# Patient Record
Sex: MALE | Race: WHITE | NOT HISPANIC OR LATINO | Employment: OTHER | URBAN - METROPOLITAN AREA
[De-identification: names, ages, dates, MRNs, and addresses within clinical notes are randomized per-mention and may not be internally consistent; named-entity substitution may affect disease eponyms.]

---

## 2017-07-10 DIAGNOSIS — E78.5 HYPERLIPIDEMIA LDL GOAL <130: ICD-10-CM

## 2017-07-10 NOTE — TELEPHONE ENCOUNTER
Reason for Call:  Medication or medication refill:    Do you use a [facility]?  Name of the pharmacy and phone number for the current request:  [facility] =[facility] - [phone]    Name of the medication requested: Lipitor    Other request: Pt called this morning and would like a refill on his statin (lipitor) please give pt a call once this prescription is ready for him to .    Can we leave a detailed message on this number? YES    Phone number patient can be reached at: Cell number on file:    Telephone Information:   Mobile [phone]       Best Time:     Call taken on 7/10/2017 at 11:24 AM by Nedra Feliciano

## 2017-07-11 RX ORDER — ATORVASTATIN CALCIUM 20 MG/1
20 TABLET, FILM COATED ORAL DAILY
Qty: 90 TABLET | Refills: 0 | Status: SHIPPED | OUTPATIENT
Start: 2017-07-11 | End: 2017-10-03

## 2017-07-11 NOTE — TELEPHONE ENCOUNTER
Medication is being filled for 1 time refill only due to:  Patient needs to be seen because due for PE and lipids.    Lab Results   Component Value Date    CHOL 119 06/13/2016     Lab Results   Component Value Date    HDL 45 06/13/2016     Lab Results   Component Value Date    LDL 57 06/13/2016     Lab Results   Component Value Date    TRIG 86 06/13/2016     Lab Results   Component Value Date    CHOLHDLRATIO 4.2 07/28/2015

## 2017-10-03 ENCOUNTER — OFFICE VISIT (OUTPATIENT)
Dept: FAMILY MEDICINE | Facility: CLINIC | Age: 62
End: 2017-10-03
Payer: COMMERCIAL

## 2017-10-03 VITALS
TEMPERATURE: 98.2 F | OXYGEN SATURATION: 95 % | HEIGHT: 69 IN | SYSTOLIC BLOOD PRESSURE: 127 MMHG | WEIGHT: 220 LBS | BODY MASS INDEX: 32.58 KG/M2 | HEART RATE: 78 BPM | DIASTOLIC BLOOD PRESSURE: 79 MMHG

## 2017-10-03 DIAGNOSIS — E78.5 HYPERLIPIDEMIA LDL GOAL <130: ICD-10-CM

## 2017-10-03 DIAGNOSIS — N18.30 CKD (CHRONIC KIDNEY DISEASE) STAGE 3, GFR 30-59 ML/MIN (H): Primary | ICD-10-CM

## 2017-10-03 LAB
ALBUMIN SERPL-MCNC: 4.3 G/DL (ref 3.4–5)
ALP SERPL-CCNC: 70 U/L (ref 40–150)
ALT SERPL W P-5'-P-CCNC: 66 U/L (ref 0–70)
ANION GAP SERPL CALCULATED.3IONS-SCNC: 8 MMOL/L (ref 3–14)
AST SERPL W P-5'-P-CCNC: 45 U/L (ref 0–45)
BILIRUB SERPL-MCNC: 3.9 MG/DL (ref 0.2–1.3)
BUN SERPL-MCNC: 12 MG/DL (ref 7–30)
CALCIUM SERPL-MCNC: 9.6 MG/DL (ref 8.5–10.1)
CHLORIDE SERPL-SCNC: 104 MMOL/L (ref 94–109)
CHOLEST SERPL-MCNC: 128 MG/DL
CO2 SERPL-SCNC: 27 MMOL/L (ref 20–32)
CREAT SERPL-MCNC: 1.23 MG/DL (ref 0.66–1.25)
CREAT UR-MCNC: 383 MG/DL
ERYTHROCYTE [DISTWIDTH] IN BLOOD BY AUTOMATED COUNT: 12.7 % (ref 10–15)
GFR SERPL CREATININE-BSD FRML MDRD: 59 ML/MIN/1.7M2
GLUCOSE SERPL-MCNC: 111 MG/DL (ref 70–99)
HCT VFR BLD AUTO: 46.9 % (ref 40–53)
HDLC SERPL-MCNC: 52 MG/DL
HGB BLD-MCNC: 16.5 G/DL (ref 13.3–17.7)
LDLC SERPL CALC-MCNC: 50 MG/DL
MCH RBC QN AUTO: 33.5 PG (ref 26.5–33)
MCHC RBC AUTO-ENTMCNC: 35.2 G/DL (ref 31.5–36.5)
MCV RBC AUTO: 95 FL (ref 78–100)
MICROALBUMIN UR-MCNC: 27 MG/L
MICROALBUMIN/CREAT UR: 7.08 MG/G CR (ref 0–17)
NONHDLC SERPL-MCNC: 76 MG/DL
PLATELET # BLD AUTO: 179 10E9/L (ref 150–450)
POTASSIUM SERPL-SCNC: 4.7 MMOL/L (ref 3.4–5.3)
PROT SERPL-MCNC: 7.7 G/DL (ref 6.8–8.8)
RBC # BLD AUTO: 4.93 10E12/L (ref 4.4–5.9)
SODIUM SERPL-SCNC: 139 MMOL/L (ref 133–144)
TRIGL SERPL-MCNC: 130 MG/DL
WBC # BLD AUTO: 5.5 10E9/L (ref 4–11)

## 2017-10-03 PROCEDURE — 80061 LIPID PANEL: CPT | Performed by: INTERNAL MEDICINE

## 2017-10-03 PROCEDURE — 36415 COLL VENOUS BLD VENIPUNCTURE: CPT | Performed by: INTERNAL MEDICINE

## 2017-10-03 PROCEDURE — 82043 UR ALBUMIN QUANTITATIVE: CPT | Performed by: INTERNAL MEDICINE

## 2017-10-03 PROCEDURE — 80053 COMPREHEN METABOLIC PANEL: CPT | Performed by: INTERNAL MEDICINE

## 2017-10-03 PROCEDURE — 85027 COMPLETE CBC AUTOMATED: CPT | Performed by: INTERNAL MEDICINE

## 2017-10-03 PROCEDURE — 99214 OFFICE O/P EST MOD 30 MIN: CPT | Performed by: INTERNAL MEDICINE

## 2017-10-03 RX ORDER — ATORVASTATIN CALCIUM 20 MG/1
20 TABLET, FILM COATED ORAL DAILY
Qty: 90 TABLET | Refills: 3 | Status: SHIPPED | OUTPATIENT
Start: 2017-10-03 | End: 2018-09-20

## 2017-10-03 NOTE — NURSING NOTE
"Chief Complaint   Patient presents with     RECHECK       Initial /79 (BP Location: Right arm, Patient Position: Sitting, Cuff Size: Adult Regular)  Pulse 78  Temp 98.2  F (36.8  C) (Oral)  Ht 5' 9\" (1.753 m)  Wt 220 lb (99.8 kg)  SpO2 95%  BMI 32.49 kg/m2 Estimated body mass index is 32.49 kg/(m^2) as calculated from the following:    Height as of this encounter: 5' 9\" (1.753 m).    Weight as of this encounter: 220 lb (99.8 kg).  Medication Reconciliation: complete   Zully Newton- CMA      "

## 2017-10-03 NOTE — MR AVS SNAPSHOT
After Visit Summary   10/3/2017    Billie Aden    MRN: [id number]           Patient Information     Date Of Birth          1955        Visit Information        Provider Department      10/3/2017 9:00 AM Jacob Graham MD [facility]        Today's Diagnoses     CKD (chronic kidney disease) stage 3, GFR 30-59 ml/min    -  1    Hyperlipidemia LDL goal <130          Care Instructions    (N18.3) CKD (chronic kidney disease) stage 3, GFR 30-59 ml/min  (primary encounter diagnosis)  Comment: We will check renal function today and continue to monitor fluid retention.  Doing well  Plan: CBC with platelets, Comprehensive metabolic         panel            (E78.5) Hyperlipidemia LDL goal <130  Comment: check fasting lipid panel today and continue atorvastatin  Plan: atorvastatin (LIPITOR) 20 MG tablet, Lipid         panel reflex to direct LDL             PSA was discussed and deferred as per USPTF guidelines           Follow-ups after your visit        Who to contact     If you have questions or need follow up information about today's clinic visit or your schedule please contact [facility] directly at [phone].  Normal or non-critical lab and imaging results will be communicated to you by SpotXchangehart, letter or phone within 4 business days after the clinic has received the results. If you do not hear from us within 7 days, please contact the clinic through SpotXchangehart or phone. If you have a critical or abnormal lab result, we will notify you by phone as soon as possible.  Submit refill requests through ClaimSync or call your pharmacy and they will forward the refill request to us. Please allow 3 business days for your refill to be completed.          Additional Information About Your Visit        MyChart Information     ClaimSync gives you secure access to your electronic health record. If you see a primary care provider, you can also send messages to your care team and make  "appointments. If you have questions, please call your primary care clinic.  If you do not have a primary care provider, please call [phone] and they will assist you.        Care EveryWhere ID     This is your Care EveryWhere ID. This could be used by other organizations to access your [place] medical records  [id number]        Your Vitals Were     Pulse Temperature Height Pulse Oximetry BMI (Body Mass Index)       78 98.2  F (36.8  C) (Oral) 5' 9\" (1.753 m) 95% 32.49 kg/m2        Blood Pressure from Last 3 Encounters:   10/03/17 127/79   10/28/16 (!) 135/94   06/13/16 126/76    Weight from Last 3 Encounters:   10/03/17 220 lb (99.8 kg)   10/28/16 217 lb (98.4 kg)   06/13/16 212 lb (96.2 kg)              We Performed the Following     CBC with platelets     Comprehensive metabolic panel     Lipid panel reflex to direct LDL          Where to get your medicines      These medications were sent to [facility], [place] - [address], [address]  [address], [address], [place] [address]     Phone:  [phone]     atorvastatin 20 MG tablet          Primary Care Provider Office Phone # Fax #    Jacob Graham [phone] [address]  [place] [address]        Equal Access to Services     [facility] : Hadii aad ku hadasho Somalik, waaxda luqadaha, qaybta kaalmada adecaliyada, chelo kendrick . So [facility] [phone].    ATENCIÓN: Si habla español, tiene a bledsoe disposición servicios gratuitos de asistencia lingüística. Llame al [phone].    We comply with applicable federal civil rights laws and [place] laws. We do not discriminate on the basis of race, color, national origin, age, disability, sex, sexual orientation, or gender identity.            Thank you!     Thank you for choosing [facility]  for your care. Our goal is always to provide you with excellent care. Hearing back from our patients is " one way we can continue to improve our services. Please take a few minutes to complete the written survey that you may receive in the mail after your visit with us. Thank you!             Your Updated Medication List - Protect others around you: Learn how to safely use, store and throw away your medicines at www.disposemymeds.org.          This list is accurate as of: 10/3/17  9:25 AM.  Always use your most recent med list.                   Brand Name Dispense Instructions for use Diagnosis    aspirin 81 MG tablet     90 tablet    Take 1 tablet (81 mg) by mouth daily    Hyperlipidemia LDL goal <130       atorvastatin 20 MG tablet    LIPITOR    90 tablet    Take 1 tablet (20 mg) by mouth daily    Hyperlipidemia LDL goal <130       sildenafil 20 MG tablet    REVATIO    30 tablet    Take 1-2 tablets (20-40 mg) by mouth daily as needed for erectile dysfunction Take 30 min to 4 hours before intercourse. ?Never use with nitroglycerin, terazosin or doxazosin    Erectile dysfunction, unspecified erectile dysfunction type

## 2017-10-03 NOTE — PATIENT INSTRUCTIONS
(N18.3) CKD (chronic kidney disease) stage 3, GFR 30-59 ml/min  (primary encounter diagnosis)  Comment: We will check renal function today and continue to monitor fluid retention.  Doing well  Plan: CBC with platelets, Comprehensive metabolic         panel            (E78.5) Hyperlipidemia LDL goal <130  Comment: check fasting lipid panel today and continue atorvastatin  Plan: atorvastatin (LIPITOR) 20 MG tablet, Lipid         panel reflex to direct LDL             PSA was discussed and deferred as per USPTF guidelines

## 2017-10-03 NOTE — PROGRESS NOTES
"[facility]  CLINIC PROGRESS NOTE    Subjective:  Hyperlipidemia LDL goal <130   Has been taking atorvastatin and possible side effects of achilles tendon soreness.    CKD (chronic kidney disease) stage 3, GFR 30-59 ml/min   No acute concerns.  Has not had any fluid retention other than swelling in the achilles area in lower legs.  Achilles Tendon Soreness   Billie Aden has noted that his achilles on both feet tend to swell and resolve spontaneously.  [place] but this resolved with just walking.      Past medical history, medications, allergies, social history, family history reviewed and updated in [facility] as of 10/3/2017 .    ROS  CONSTITUTIONAL: no fatigue, no unexpected change in weight  SKIN: no worrisome rashes, no worrisome moles, no worrisome lesions  EYES: no acute vision problems or changes  ENT: no ear problems, no mouth problems, no throat problems  RESP: no significant cough, no shortness of breath  CV: no chest pain, no palpitations, no new or worsening peripheral edema  GI: no nausea, no vomiting, no constipation, no diarrhea  : no frequency, no dysuria, no hematuria  MS: as above   PSYCHIATRIC: no changes in mood or affect      Objective:  Vitals  /79 (BP Location: Right arm, Patient Position: Sitting, Cuff Size: Adult Regular)  Pulse 78  Temp 98.2  F (36.8  C) (Oral)  Ht 5' 9\" (1.753 m)  Wt 220 lb (99.8 kg)  SpO2 95%  BMI 32.49 kg/m2  GEN: Alert Oriented x3 NAD  HEENT: Atraumatic, normocephalic, neck supple, no thyromegaly, negative cervical adenopathy  TM: TM bilaterally pearly and grey with normal light reflex  CV: RRR no murmurs or rubs  PULM: CTA no wheezes or crackles  ABD: Soft, nontender nondistended, no hepatosplenomegally  SKIN: No visible skin lesion or ulcerations  EXT: No edema bilateral lower extremities, achilles appear normal flexion and extension  NEURO: Gait and station normal, No focal neurologic " deficits  PSYCH: Mood good, affect mood congruent    Results for orders placed or performed in visit on 10/03/17 (from the past 24 hour(s))   CBC with platelets   Result Value Ref Range    WBC 5.5 4.0 - 11.0 10e9/L    RBC Count 4.93 4.4 - 5.9 10e12/L    Hemoglobin 16.5 13.3 - 17.7 g/dL    Hematocrit 46.9 40.0 - 53.0 %    MCV 95 78 - 100 fl    MCH 33.5 (H) 26.5 - 33.0 pg    MCHC 35.2 31.5 - 36.5 g/dL    RDW 12.7 10.0 - 15.0 %    Platelet Count 179 150 - 450 10e9/L       Assessment/Plan:  Patient Instructions   (N18.3) CKD (chronic kidney disease) stage 3, GFR 30-59 ml/min  (primary encounter diagnosis)  Comment: We will check renal function today and continue to monitor fluid retention.  Doing well  Plan: CBC with platelets, Comprehensive metabolic         panel            (E78.5) Hyperlipidemia LDL goal <130  Comment: check fasting lipid panel today and continue atorvastatin - discussed this was not likely causing achilles tendon pain   Plan: atorvastatin (LIPITOR) 20 MG tablet, Lipid         panel reflex to direct LDL             PSA was discussed and deferred as per USPTF guidelines     Recommend shingles vaccine and flu shot      Follow up in 6 months     Disclaimer: This note consists of symbols derived from keyboarding, dictation and/or voice recognition software. As a result, there may be errors in the script that have gone undetected. Please consider this when interpreting information found in this chart.    Jacob Graham MD  [phone]

## 2017-10-04 ENCOUNTER — TELEPHONE (OUTPATIENT)
Dept: FAMILY MEDICINE | Facility: CLINIC | Age: 62
End: 2017-10-04

## 2017-10-04 DIAGNOSIS — E80.6 HYPERBILIRUBINEMIA: ICD-10-CM

## 2017-10-04 DIAGNOSIS — N18.30 CKD (CHRONIC KIDNEY DISEASE) STAGE 3, GFR 30-59 ML/MIN (H): Primary | ICD-10-CM

## 2017-10-04 NOTE — PROGRESS NOTES
Martinez Wise,    I had the opportunity to review your recent labs and a summary of your labs reads as follows:    Your complete blood counts show no sign of anemia, normal white blood cell count and platelets.  Your comprehensive metabolic panel showed slightly worsening renal function and I would recommend you continue to try to push fluids and remain hydrated.  We can recheck this in the next month  Interestingly, your bilirubin is still a bit elevated and I would like to repeat your liver function tests when you are not fasting.  Can we recheck this in one month as welll?  Your fasting lipid panel show  - normal HDL (good) cholesterol -as your goal is greater than 40  - low LDL (bad) cholesterol as your goal is less than 130  - normal triglyceride levels      Sincerely,  Jacob Graham MD

## 2017-10-04 NOTE — TELEPHONE ENCOUNTER
Patient said he saw his labs on MyChart and is wondering what the slightly elevated result of MCH means? Should he be concern?     Also, patient would like to know when he should follow up with you? After his labs next month?    Thank you,    HILLARY Fontanez

## 2017-10-04 NOTE — TELEPHONE ENCOUNTER
To PCP:  Are you able to outreach to patient regarding lab results?  Please see below message.  Thank you.  Sonam Grady RN

## 2017-10-04 NOTE — TELEPHONE ENCOUNTER
Can we call Billie Aden and let him know that     Martinez Wise,    I had the opportunity to review your recent labs and a summary of your labs reads as follows:    Your complete blood counts show no sign of anemia, normal white blood cell count and platelets.  Your comprehensive metabolic panel showed slightly worsening renal function and I would recommend you continue to try to push fluids and remain hydrated.  We can recheck this in the next month  Interestingly, your bilirubin is still a bit elevated and I would like to repeat your liver function tests when you are not fasting.  Can we recheck this in one month as welll?  Your fasting lipid panel show  - normal HDL (good) cholesterol -as your goal is greater than 40  - low LDL (bad) cholesterol as your goal is less than 130  - normal triglyceride levels        Sincerely,  Jacob Graham MD

## 2017-10-04 NOTE — TELEPHONE ENCOUNTER
Patient was informed of Dr Graham note and the patient is still confused and want someone to explain more to him about the test and should he be concerned. Can someone assist this patient    VELVET Cortez

## 2017-10-04 NOTE — TELEPHONE ENCOUNTER
MCH is the mean corpuscular hemoglobin and is a measurement of hemoglobin concentration divided by red blood cells.  It is useful if it is low as it sometimes a finding of iron deficiency anemia, but elevated MCH is not typically helpful in working up blood disorders    Jacob Graham MD

## 2017-10-23 ENCOUNTER — TRANSFERRED RECORDS (OUTPATIENT)
Dept: HEALTH INFORMATION MANAGEMENT | Facility: CLINIC | Age: 62
End: 2017-10-23

## 2017-10-25 DIAGNOSIS — E80.6 HYPERBILIRUBINEMIA: ICD-10-CM

## 2017-10-25 DIAGNOSIS — N18.30 CKD (CHRONIC KIDNEY DISEASE) STAGE 3, GFR 30-59 ML/MIN (H): ICD-10-CM

## 2017-10-25 LAB
ALBUMIN SERPL-MCNC: 4.1 G/DL (ref 3.4–5)
ALP SERPL-CCNC: 70 U/L (ref 40–150)
ALT SERPL W P-5'-P-CCNC: 56 U/L (ref 0–70)
ANION GAP SERPL CALCULATED.3IONS-SCNC: 11 MMOL/L (ref 3–14)
AST SERPL W P-5'-P-CCNC: 40 U/L (ref 0–45)
BILIRUB DIRECT SERPL-MCNC: 0.4 MG/DL (ref 0–0.2)
BILIRUB SERPL-MCNC: 2.2 MG/DL (ref 0.2–1.3)
BUN SERPL-MCNC: 22 MG/DL (ref 7–30)
CALCIUM SERPL-MCNC: 9.3 MG/DL (ref 8.5–10.1)
CHLORIDE SERPL-SCNC: 108 MMOL/L (ref 94–109)
CO2 SERPL-SCNC: 24 MMOL/L (ref 20–32)
CREAT SERPL-MCNC: 1.12 MG/DL (ref 0.66–1.25)
GFR SERPL CREATININE-BSD FRML MDRD: 66 ML/MIN/1.7M2
GLUCOSE SERPL-MCNC: 111 MG/DL (ref 70–99)
POTASSIUM SERPL-SCNC: 4.8 MMOL/L (ref 3.4–5.3)
PROT SERPL-MCNC: 7.5 G/DL (ref 6.8–8.8)
SODIUM SERPL-SCNC: 143 MMOL/L (ref 133–144)

## 2017-10-25 PROCEDURE — 36415 COLL VENOUS BLD VENIPUNCTURE: CPT | Performed by: INTERNAL MEDICINE

## 2017-10-25 PROCEDURE — 80076 HEPATIC FUNCTION PANEL: CPT | Performed by: INTERNAL MEDICINE

## 2017-10-25 PROCEDURE — 80048 BASIC METABOLIC PNL TOTAL CA: CPT | Performed by: INTERNAL MEDICINE

## 2017-10-27 NOTE — PROGRESS NOTES
Martinez Wise,    I have had the opportunity to review your recent results and an interpretation is as follows:  Your follow up labs show an improvement in your creatinine - continue to push fluids and we can follow up in 6 months to recheck  Your liver function tests also show an improvement back to baseline of your bilirubin level.    Sincerely,  Jacob Graham MD

## 2017-11-10 ENCOUNTER — TELEPHONE (OUTPATIENT)
Dept: FAMILY MEDICINE | Facility: CLINIC | Age: 62
End: 2017-11-10

## 2017-11-10 DIAGNOSIS — Z01.00 ROUTINE EYE EXAM: Primary | ICD-10-CM

## 2017-11-10 NOTE — TELEPHONE ENCOUNTER
Reason for Call: Request for an order or referral:    Order or referral being requested: Opthamologist    Date needed: as soon as possible    Has the patient been seen by the PCP for this problem? YES    Additional comments: Billie Aden would like to speak with Dr. Graham where he should go.    Phone number Patient can be reached at:  Home number on file [phone] (home)    Best Time:  ASAP     Can we leave a detailed message on this number?  YES    Call taken on 11/10/2017 at 3:08 PM by Susan Avitia

## 2017-11-21 ENCOUNTER — PRE VISIT (OUTPATIENT)
Dept: OPHTHALMOLOGY | Facility: CLINIC | Age: 62
End: 2017-11-21

## 2017-11-21 NOTE — TELEPHONE ENCOUNTER
For the evaluation of   Chief Complaint   Patient presents with     Previsit     approsa w/ Dr Melvin     Basal Cell Carcinoma     left lower eye lid, consult prior to Mohs w/ [facility] office no date scheduled for procedure       When was your last eye exam w/ Dilation?   Do you wear glasses?  Please bring them with you to your appointment.  Do you wear contacts?   How many hours per day to you wear your lenses?   Please bring the boxes with you to your appointment.      HPI:  Location:   OS Lower eye lid  Symptoms:   Negative for vision changes or eye problems  Pertinent Negatives:   Negative for vision changes or eye problems  Severity:   mild  Duration:   years  2  Timing:   gradual onset  Other:           Do you use any eye drops? NO  For what condition(s)?    Ocular Meds:  none       ROS:    Review Of Systems  Eyes: as above  Endocrine:      Allergies:    Allergies   Allergen Reactions     Morphine Anxiety       Systemic Medications:   Current Outpatient Prescriptions   Medication     atorvastatin (LIPITOR) 20 MG tablet     sildenafil (REVATIO/VIAGRA) 20 MG tablet     aspirin 81 MG tablet     No current facility-administered medications for this visit.        Family History   Problem Relation Age of Onset     Cancer - colorectal Mother 79     Cardiovascular Father      carotid stenosis     HEART DISEASE Brother 66       Social History   Substance Use Topics     Smoking status: Never Smoker     Smokeless tobacco: Never Used     Alcohol use 0.0 oz/week     0 Standard drinks or equivalent per week      Comment: occasionally       Maria R KENDRICK OSC

## 2017-11-22 ENCOUNTER — OFFICE VISIT (OUTPATIENT)
Dept: OPHTHALMOLOGY | Facility: CLINIC | Age: 62
End: 2017-11-22
Payer: COMMERCIAL

## 2017-11-22 DIAGNOSIS — C44.111 BCC (BASAL CELL CARCINOMA), EYELID, LEFT: Primary | ICD-10-CM

## 2017-11-22 PROCEDURE — 99203 OFFICE O/P NEW LOW 30 MIN: CPT | Performed by: OPHTHALMOLOGY

## 2017-11-22 PROCEDURE — 92285 EXTERNAL OCULAR PHOTOGRAPHY: CPT | Performed by: OPHTHALMOLOGY

## 2017-11-22 ASSESSMENT — VISUAL ACUITY
CORRECTION_TYPE: GLASSES
OS_CC: 20/20
OD_CC: 20/20
METHOD: SNELLEN - LINEAR
OD_CC+: -1

## 2017-11-22 ASSESSMENT — EXTERNAL EXAM - RIGHT EYE: OD_EXAM: NORMAL

## 2017-11-22 ASSESSMENT — SLIT LAMP EXAM - LIDS: COMMENTS: NORMAL

## 2017-11-22 ASSESSMENT — EXTERNAL EXAM - LEFT EYE: OS_EXAM: NORMAL

## 2017-11-22 NOTE — MR AVS SNAPSHOT
After Visit Summary   11/22/2017    Billie Aden    MRN: [id number]           Patient Information     Date Of Birth          1955        Visit Information        Provider Department      11/22/2017 8:45 AM Claudia Melvin MD [facility]        Today's Diagnoses     BCC (basal cell carcinoma), eyelid, left    -  1       Follow-ups after your visit        Who to contact     If you have questions or need follow up information about today's clinic visit or your schedule please contact [facility] directly at [phone].  Normal or non-critical lab and imaging results will be communicated to you by MyChart, letter or phone within 4 business days after the clinic has received the results. If you do not hear from us within 7 days, please contact the clinic through Push Energyhart or phone. If you have a critical or abnormal lab result, we will notify you by phone as soon as possible.  Submit refill requests through RebelMail or call your pharmacy and they will forward the refill request to us. Please allow 3 business days for your refill to be completed.          Additional Information About Your Visit        MyChart Information     RebelMail gives you secure access to your electronic health record. If you see a primary care provider, you can also send messages to your care team and make appointments. If you have questions, please call your primary care clinic.  If you do not have a primary care provider, please call [phone] and they will assist you.      RebelMail is an electronic gateway that provides easy, online access to your medical records. With RebelMail, you can request a clinic appointment, read your test results, renew a prescription or communicate with your care team.     To access your existing account, please contact your [facility] or call [phone] for assistance.        Care EveryWhere ID     This is your Care EveryWhere ID.  This could be used by other organizations to access your [place] medical records  [id number]         Blood Pressure from Last 3 Encounters:   10/03/17 127/79   10/28/16 (!) 135/94   06/13/16 126/76    Weight from Last 3 Encounters:   10/03/17 99.8 kg (220 lb)   10/28/16 98.4 kg (217 lb)   06/13/16 96.2 kg (212 lb)              We Performed the Following     External Photos OS (left eye)     [employer]-Operative Worksheet (Plastics)        Primary Care Provider Office Phone # Fax #    Jacob Graham [phone] [address]  [place] [address]        Equal Access to Services     JOSEPHINE DOE : Hadii tin austin hadasho Somalik, waaxda luqadaha, qaybta kaalmada adeegyada, waxyolanda kendrick . So [facility] [phone].    ATENCIÓN: Si habla español, tiene a bledsoe disposición servicios gratuitos de asistencia lingüística. [facility] [phone].    We comply with applicable federal civil rights laws and [place] laws. We do not discriminate on the basis of race, color, national origin, age, disability, sex, sexual orientation, or gender identity.            Thank you!     Thank you for choosing [facility]  for your care. Our goal is always to provide you with excellent care. Hearing back from our patients is one way we can continue to improve our services. Please take a few minutes to complete the written survey that you may receive in the mail after your visit with us. Thank you!             Your Updated Medication List - Protect others around you: Learn how to safely use, store and throw away your medicines at www.disposemymeds.org.          This list is accurate as of: 11/22/17  9:29 AM.  Always use your most recent med list.                   Brand Name Dispense Instructions for use Diagnosis    aspirin 81 MG tablet     90 tablet    Take 1 tablet (81 mg) by mouth daily    Hyperlipidemia LDL goal <130       atorvastatin 20 MG tablet    LIPITOR    90  tablet    Take 1 tablet (20 mg) by mouth daily    Hyperlipidemia LDL goal <130       sildenafil 20 MG tablet    REVATIO    30 tablet    Take 1-2 tablets (20-40 mg) by mouth daily as needed for erectile dysfunction Take 30 min to 4 hours before intercourse. ?Never use with nitroglycerin, terazosin or doxazosin    Erectile dysfunction, unspecified erectile dysfunction type

## 2017-11-22 NOTE — LETTER
2017         RE:  :  MRN: Billie Aden  1955  [id number]     Dear Dr. Figueroa ,    Thank you for asking me to see your patient, Billie Aden, for an oculoplastic   consultation.  My assessment and plan are below.  For further details, please see my attached clinic note.      Chief Complaints and History of Present Illnesses   Patient presents with     Consult For       Basal Cell Carcinoma-left lower eye lid, consult prior to Mohs w/ [facility] no date scheduled for procedure      Reports left eyelid margin lesion for 2 years with recent bleeding s/p shave biopsy by dermatology on 10/23/17. Pathology returned positive for basal cell carcinoma, nodular with negative margins.      History:  S/p right temple Moh's surgery for BASAL CELL CARCINOMA      Billie Aden is a 62 year old male with the following diagnoses:   1. BCC (basal cell carcinoma), eyelid, left       Recent diagnosis   Will require Moh's excision of lesion   Plan same day reconstruction         Fredis Saavedra MD  Ophthalmology Resident, PGY-2  [facility]     Agree with above, will likely be wedge reconstruction.          Again, thank you for allowing me to participate in the care of your patient.      Sincerely,    Claudia Melvin MD  Department of Ophthalmology and Visual Neurosciences  [facility]    CC: Jacob Graham MD  [address]  [place] [address]  VIA In Basket     Tucker Figueroa MD  [facility]  [address]  [place] [address]  VIA Facsimile: [phone]

## 2017-11-22 NOTE — NURSING NOTE
Patient presents with:  Consult For: Basal Cell Carcinoma-left lower eye lid, consult prior to Mohs w/ [facility]uero[facility]'s office no date scheduled for procedure      Referring Provider:  No referring provider defined for this encounter.    HPI    Affected eye(s):  Left   Location:  Lower   Symptoms:        Duration:  6 months   Frequency:  Constant       Do you have eye pain now?:  No      Comments:  Over the last 5-6 months had had two episodes of bleeding.

## 2017-11-27 ENCOUNTER — TELEPHONE (OUTPATIENT)
Dept: OPHTHALMOLOGY | Facility: CLINIC | Age: 62
End: 2017-11-27

## 2017-11-27 NOTE — TELEPHONE ENCOUNTER
"Procedure:  left lower eyelid mohs reconstruction  Facility: [facility], [facility], [facility] or [facility]  Length: .30 minute(s)  Surgeon:Claudia Melvin MD  Anesthesia: Local with MAC  Diagnosis: BCC  Out Patient or AM admit:  (Same day)  BMI:Estimated body mass index is 32.49 kg/(m^2) as calculated from the following:    Height as of 10/3/17: 1.753 m (5' 9\").    Weight as of 10/3/17: 99.8 kg (220 lb). (If over 43 for general or 45 for MAC cannot be scheduled at [facility])   Pre-op Appointments needed: History & Physical within 30 days of surgery  Post-op appointments needed: BCC 1 week -3 weeks   needed:No   Surgery packet/instructions given to patient?:  Yes  Pre op teaching done:  Yes  Lens Orders Needed: No   Referring provider:   Special Considerations:           "

## 2017-11-29 ENCOUNTER — APPOINTMENT (OUTPATIENT)
Age: 62
Setting detail: DERMATOLOGY
End: 2017-12-06

## 2017-11-29 DIAGNOSIS — Z48.02 ENCOUNTER FOR REMOVAL OF SUTURES: ICD-10-CM

## 2017-11-29 PROBLEM — C44.119 BASAL CELL CARCINOMA OF SKIN OF LEFT EYELID, INCLUDING CANTHUS: Status: ACTIVE | Noted: 2017-11-29

## 2017-11-29 PROCEDURE — OTHER MIPS QUALITY: OTHER

## 2017-11-29 PROCEDURE — OTHER CONSULTATION FOR MOHS SURGERY: OTHER

## 2017-11-29 PROCEDURE — OTHER COUNSELING: OTHER

## 2017-11-29 PROCEDURE — 99202 OFFICE O/P NEW SF 15 MIN: CPT

## 2017-11-29 ASSESSMENT — LOCATION SIMPLE DESCRIPTION DERM: LOCATION SIMPLE: RIGHT FOREHEAD

## 2017-11-29 ASSESSMENT — LOCATION ZONE DERM: LOCATION ZONE: FACE

## 2017-11-29 ASSESSMENT — LOCATION DETAILED DESCRIPTION DERM: LOCATION DETAILED: RIGHT INFERIOR LATERAL FOREHEAD

## 2017-11-29 NOTE — PROCEDURE: MIPS QUALITY
Quality 143: Oncology: Medical And Radiation- Pain Intensity Quantified: Pain severity quantified, no pain present
Detail Level: Detailed
Quality 110: Preventive Care And Screening: Influenza Immunization: Influenza Immunization previously received during influenza season
Quality 130: Documentation Of Current Medications In The Medical Record: Current Medications Documented
Quality 226: Preventive Care And Screening: Tobacco Use: Screening And Cessation Intervention: Patient screened for tobacco and never smoked
Quality 431: Preventive Care And Screening: Unhealthy Alcohol Use - Screening: Patient screened for unhealthy alcohol use using a single question and scores less than 2 times per year

## 2017-11-29 NOTE — PROCEDURE: CONSULTATION FOR MOHS SURGERY
Name Of The Referring Provider For Procedure: Unruly Neal MD
X Size Of Lesion In Cm (Optional): 0
Size Of Lesion: 0.3
Incorporate Mauc In Note: Yes
Body Location Override (Optional - Billing Will Still Be Based On Selected Body Map Location If Applicable): L lower eyelid
Detail Level: Detailed

## 2017-12-15 NOTE — TELEPHONE ENCOUNTER
Date Scheduled: 1-25-18 at 3:00pm  Facility: [facility]  Surgeon: Dr. Melvin   Post-op appointment scheduled:   Next 5 appointments (look out 90 days)     Feb 07, 2018 10:00 AM CST   [facility] Claudia [facility]vin [facility]   [facility]    [address] [place] [address] [phone]                   scheduled?: No  Surgery packet/instructions confirmed received?  Yes, mailed  Special Considerations:   Sheridan James, Surgery Scheduling Coordinator

## 2018-01-24 ENCOUNTER — OFFICE VISIT (OUTPATIENT)
Dept: FAMILY MEDICINE | Facility: CLINIC | Age: 63
End: 2018-01-24
Payer: COMMERCIAL

## 2018-01-24 ENCOUNTER — ANESTHESIA EVENT (OUTPATIENT)
Dept: SURGERY | Facility: AMBULATORY SURGERY CENTER | Age: 63
End: 2018-01-24

## 2018-01-24 VITALS
WEIGHT: 221 LBS | DIASTOLIC BLOOD PRESSURE: 90 MMHG | TEMPERATURE: 98.2 F | BODY MASS INDEX: 32.73 KG/M2 | HEART RATE: 74 BPM | SYSTOLIC BLOOD PRESSURE: 142 MMHG | HEIGHT: 69 IN | OXYGEN SATURATION: 95 %

## 2018-01-24 DIAGNOSIS — Z01.818 PREOP GENERAL PHYSICAL EXAM: Primary | ICD-10-CM

## 2018-01-24 DIAGNOSIS — C44.310 BCC (BASAL CELL CARCINOMA), FACE: ICD-10-CM

## 2018-01-24 PROCEDURE — 99214 OFFICE O/P EST MOD 30 MIN: CPT | Performed by: INTERNAL MEDICINE

## 2018-01-24 NOTE — PROGRESS NOTES
[address] [address]  [phone]  Dept: [phone]    PRE-OP EVALUATION:  Today's date: 2018    Billie Aden (: 1955) presents for pre-operative evaluation assessment as requested by Claudia Velazco MD.  He requires evaluation and anesthesia risk assessment prior to undergoing surgery/procedure for treatment of left eyelid basal cell skin cancer .  Proposed procedure:   REPAIR MOHS Left   Reconstruction      Date of Surgery/ Procedure: 2018  Time of Surgery/ Procedure: 1:20pm  Hospital/Surgical Facility:  OR  Primary Physician: Jacob Graham  Type of Anesthesia Anticipated: Local with MAC    Patient has a Health Care Directive or Living Will:  YES     1. NO - Do you have a history of heart attack, stroke, stent, bypass or surgery on an artery in the head, neck, heart or legs?  2. NO - Do you ever have any pain or discomfort in your chest?  3. NO - Do you have a history of  Heart Failure?  4. NO - Are you troubled by shortness of breath when: walking on the level, up a slight hill or at night?  5. yes - Do you currently have a cold, bronchitis or other respiratory infection?  6. NO - Do you have a cough, shortness of breath or wheezing?  7. NO - Do you sometimes get pains in the calves of your legs when you walk?  8. yes - Do you or anyone in your family have previous history of blood clots?  9. NO - Do you or does anyone in your family have a serious bleeding problem such as prolonged bleeding following surgeries or cuts?  10. NO - Have you ever had problems with anemia or been told to take iron pills?  11. NO - Have you had any abnormal blood loss such as black, tarry or bloody stools, or abnormal vaginal bleeding?  12. NO - Have you ever had a blood transfusion?  13. NO - Have you or any of your relatives ever had problems with anesthesia?  14. NO - Do you have sleep apnea, excessive snoring or daytime drowsiness?  15. NO -  Do you have any prosthetic heart valves?  16. NO - Do you have prosthetic joints?  17. NO - Is there any chance that you may be pregnant?      HPI:                                                      Brief HPI related to upcoming procedure: patient Billie Aden is a very pleasant 62 year old male with a history basal cell skin cancer of the left eyelid who presents to internal medicine clinic today for pre op cardiac evaluation for upcoming left eyelid MOHS surgery with reconstruction for basal cell skin cancer treatment. Patient denies any known history of allergies to anesthesia agents. Patient has been holding his daily aspirin medication for the past a few days. He denies any chest pain, headaches, fever or chills.        MEDICAL HISTORY:                                                      Patient Active Problem List    Diagnosis Date Noted     Coronary artery calcification 06/13/2016     Priority: Medium     Nephrolithiasis 10/08/2013     Priority: Medium     Hyperlipidemia LDL goal <130 09/13/2013     Priority: Medium     CKD (chronic kidney disease) stage 3, GFR 30-59 ml/min 09/13/2013     Priority: Medium     IFG (impaired fasting glucose) 09/13/2013     Priority: Medium     Hyperbilirubinemia 09/13/2013     Priority: Medium     Diagnosis updated by automated process. Provider to review and confirm.        Past Medical History:   Diagnosis Date     BCC (basal cell carcinoma of skin) 11/2017    left lower lid     Cholelithiasis 10/8/2013     Headache(784.0) 1977     Pelvic fracture (H) 11/2014    occured while horseback riding     Pre-hypertension      Renal disease     kidney stone     Past Surgical History:   Procedure Laterality Date     CHOLECYSTECTOMY       HC CLOSED TX SHOULDER DISLOC W MANIP NO ANESTH       LAPAROSCOPIC CHOLECYSTECTOMY N/A 12/23/2014    Procedure: LAPAROSCOPIC CHOLECYSTECTOMY;  Surgeon: Avelino Bryson MD;  Location: [facility]     Current Outpatient Prescriptions   Medication Sig  "Dispense Refill     atorvastatin (LIPITOR) 20 MG tablet Take 1 tablet (20 mg) by mouth daily 90 tablet 3     sildenafil (REVATIO/VIAGRA) 20 MG tablet Take 1-2 tablets (20-40 mg) by mouth daily as needed for erectile dysfunction Take 30 min to 4 hours before intercourse.  Never use with nitroglycerin, terazosin or doxazosin 30 tablet 3     aspirin 81 MG tablet Take 1 tablet (81 mg) by mouth daily 90 tablet 3     OTC products: None, except as noted above    Allergies   Allergen Reactions     Morphine Anxiety      Latex Allergy: NO    Social History   Substance Use Topics     Smoking status: Never Smoker     Smokeless tobacco: Never Used     Alcohol use 0.0 oz/week     0 Standard drinks or equivalent per week      Comment: occasionally     History   Drug Use No       REVIEW OF SYSTEMS:                                                    Constitutional, neuro, ENT, endocrine, pulmonary, cardiac, gastrointestinal, genitourinary, musculoskeletal, integument and psychiatric systems are negative, except as otherwise noted.    EXAM:                                                    /90 (BP Location: Right arm, Cuff Size: Adult Large)  Pulse 74  Temp 98.2  F (36.8  C) (Oral)  Ht 5' 8.5\" (1.74 m)  Wt 221 lb (100.2 kg)  SpO2 95%  BMI 33.11 kg/m2    GENERAL APPEARANCE: alert and no distress     EYES: EOMI,  PERRL     HENT: ear canals and TM's normal and nose and mouth without ulcers or lesions     NECK: no adenopathy, no asymmetry, masses, or scars and thyroid normal to palpation     RESP: lungs clear to auscultation - no rales, rhonchi or wheezes     CV: regular rates and rhythm, normal S1 S2, no S3 or S4 and no murmur, click or rub     ABDOMEN:  soft, nontender, no HSM or masses and bowel sounds normal     MS: extremities normal- no gross deformities noted, no evidence of inflammation in joints, FROM in all extremities.     NEURO: Normal strength and tone, sensory exam grossly normal, mentation intact and speech " normal     PSYCH: mentation appears normal. and affect normal/bright     LYMPHATICS: No axillary, cervical, or supraclavicular nodes    DIAGNOSTICS:                                                    No labs or EKG required for low risk surgery (cataract, skin procedure, breast biopsy, etc)    Recent Labs   Lab Test  10/25/17   0928  10/03/17   0929  06/13/16   0928  11/26/14 09/30/14   0844   HGB   --   16.5  15.7   < >   --   15.4   PLT   --   179  194   < >   --   178   INR   --    --    --    --   0.95   --    NA  143  139  140   < >   --   143   POTASSIUM  4.8  4.7  4.0   < >   --   4.3   CR  1.12  1.23  1.02   < >   --   1.18   A1C   --    --    --    --    --   5.5    < > = values in this interval not displayed.        IMPRESSION:                                                    Reason for surgery/procedure: left eyelid basal cell skin cancer  Diagnosis/reason for consult: pre op cardiac evaluation for upcoming left eyelid MOHS surgery with reconstruction for basal cell skin cancer treatment.    The proposed surgical procedure is considered LOW risk.    REVISED CARDIAC RISK INDEX  The patient has the following serious cardiovascular risks for perioperative complications such as (MI, PE, VFib and 3  AV Block):  No serious cardiac risks  INTERPRETATION: 0 risks: Class I (very low risk - 0.4% complication rate)    The patient has the following additional risks for perioperative complications:  No identified additional risks      RECOMMENDATIONS:                                                          --Patient is to take all scheduled medications on the day of surgery EXCEPT for modifications listed below.  Patient has been holding his daily aspirin medication for the past a few days. Patient is advised to continue holding his daily aspirin medication until after the surgery is completed.      APPROVAL GIVEN to proceed with proposed procedure, without further diagnostic evaluation.       Signed  Electronically by: Alida Denis MD    Copy of this evaluation report is provided to requesting physician.    [facility] Preop Guidelines

## 2018-01-24 NOTE — NURSING NOTE
"Chief Complaint   Patient presents with     Pre-Op Exam       Initial /90 (BP Location: Right arm, Cuff Size: Adult Large)  Pulse 74  Temp 98.2  F (36.8  C) (Oral)  Ht 5' 8.5\" (1.74 m)  Wt 221 lb (100.2 kg)  SpO2 95%  BMI 33.11 kg/m2 Estimated body mass index is 33.11 kg/(m^2) as calculated from the following:    Height as of this encounter: 5' 8.5\" (1.74 m).    Weight as of this encounter: 221 lb (100.2 kg).  Medication Reconciliation: complete   Natividad Winters MA    "

## 2018-01-24 NOTE — MR AVS SNAPSHOT
After Visit Summary   1/24/2018    Billie Aden    MRN: [id number]           Patient Information     Date Of Birth          1955        Visit Information        Provider Department      1/24/2018 10:00 AM Alida Denis MD [facility]        Today's Diagnoses     Preop general physical exam    -  1    BCC (basal cell carcinoma), face          Care Instructions      Before Your Surgery      Call your surgeon if there is any change in your health. This includes signs of a cold or flu (such as a sore throat, runny nose, cough, rash or fever).    Do not smoke, drink alcohol or take over the counter medicine (unless your surgeon or primary care doctor tells you to) for the 24 hours before and after surgery.    If you take prescribed drugs: Follow your doctor s orders about which medicines to take and which to stop until after surgery.    Eating and drinking prior to surgery: follow the instructions from your surgeon    Take a shower or bath the night before surgery. Use the soap your surgeon gave you to gently clean your skin. If you do not have soap from your surgeon, use your regular soap. Do not shave or scrub the surgery site.  Wear clean pajamas and have clean sheets on your bed.           Follow-ups after your visit        Your next 10 appointments already scheduled     Jan 25, 2018   Procedure with Claudia Melvin MD   [facility] ([facility])    [address] [place] [address]   [phone]           Located in the Clinics and Surgery Center at [address] [place] [address].   parking is very convenient and highly recommended.  is a $6 flat rate fee.  Both  and self parkers should enter the main arrival plaza from [facility]; parking attendants will direct you based on your parking preference.            Feb 07, 2018 10:00 AM CST   Return Visit with Claudia Melvin MD  "  [facility]    [address] [address] [place] [address] [phone]              Who to contact     If you have questions or need follow up information about today's clinic visit or your schedule please contact [facility] directly at [phone].  Normal or non-critical lab and imaging results will be communicated to you by MyChart, letter or phone within 4 business days after the clinic has received the results. If you do not hear from us within 7 days, please contact the clinic through [employer] or phone. If you have a critical or abnormal lab result, we will notify you by phone as soon as possible.  Submit refill requests through [employer] or call your pharmacy and they will forward the refill request to us. Please allow 3 business days for your refill to be completed.          Additional Information About Your Visit        [employer] Information     [employer] gives you secure access to your electronic health record. If you see a primary care provider, you can also send messages to your care team and make appointments. If you have questions, please call your primary care clinic.  If you do not have a primary care provider, please call [phone] and they will assist you.        Care EveryWhere ID     This is your Care EveryWhere ID. This could be used by other organizations to access your [place] medical records  [id number]        Your Vitals Were     Pulse Temperature Height Pulse Oximetry BMI (Body Mass Index)       74 98.2  F (36.8  C) (Oral) 5' 8.5\" (1.74 m) 95% 33.11 kg/m2        Blood Pressure from Last 3 Encounters:   01/24/18 142/90   10/03/17 127/79   10/28/16 (!) 135/94    Weight from Last 3 Encounters:   01/24/18 221 lb (100.2 kg)   10/03/17 220 lb (99.8 kg)   10/28/16 217 lb (98.4 kg)              Today, you had the following     No orders found for display       Primary Care Provider Office Phone # Fax #    Jacob Graham MD " [phone]       [address] [facility] [address]  [facility] [address]        Equal Access to Services     TIFFANIE DOE : Hadii aad ku hadjanny Romo, waangelicada luqfelipe, qapankajta kaalmada bladimir, chelo monetcarrillo ludin. So [facility] [phone].    ATENCIÓN: Si habla español, tiene a bledsoe disposición servicios gratuitos de asistencia lingüística. Llame al [phone].    We comply with applicable federal civil rights laws and [place] laws. We do not discriminate on the basis of race, color, national origin, age, disability, sex, sexual orientation, or gender identity.            Thank you!     Thank you for choosing [facility]  for your care. Our goal is always to provide you with excellent care. Hearing back from our patients is one way we can continue to improve our services. Please take a few minutes to complete the written survey that you may receive in the mail after your visit with us. Thank you!             Your Updated Medication List - Protect others around you: Learn how to safely use, store and throw away your medicines at www.disposemymeds.org.          This list is accurate as of 1/24/18 10:36 AM.  Always use your most recent med list.                   Brand Name Dispense Instructions for use Diagnosis    aspirin 81 MG tablet     90 tablet    Take 1 tablet (81 mg) by mouth daily    Hyperlipidemia LDL goal <130       atorvastatin 20 MG tablet    LIPITOR    90 tablet    Take 1 tablet (20 mg) by mouth daily    Hyperlipidemia LDL goal <130       sildenafil 20 MG tablet    REVATIO    30 tablet    Take 1-2 tablets (20-40 mg) by mouth daily as needed for erectile dysfunction Take 30 min to 4 hours before intercourse. ?Never use with nitroglycerin, terazosin or doxazosin    Erectile dysfunction, unspecified erectile dysfunction type

## 2018-01-25 ENCOUNTER — HOSPITAL ENCOUNTER (OUTPATIENT)
Facility: AMBULATORY SURGERY CENTER | Age: 63
End: 2018-01-25
Attending: OPHTHALMOLOGY
Payer: COMMERCIAL

## 2018-01-25 ENCOUNTER — SURGERY (OUTPATIENT)
Age: 63
End: 2018-01-25

## 2018-01-25 ENCOUNTER — TRANSFERRED RECORDS (OUTPATIENT)
Dept: HEALTH INFORMATION MANAGEMENT | Facility: CLINIC | Age: 63
End: 2018-01-25

## 2018-01-25 ENCOUNTER — ANESTHESIA (OUTPATIENT)
Dept: SURGERY | Facility: AMBULATORY SURGERY CENTER | Age: 63
End: 2018-01-25

## 2018-01-25 VITALS
WEIGHT: 221 LBS | TEMPERATURE: 97.9 F | SYSTOLIC BLOOD PRESSURE: 144 MMHG | DIASTOLIC BLOOD PRESSURE: 78 MMHG | RESPIRATION RATE: 16 BRPM | HEIGHT: 69 IN | OXYGEN SATURATION: 94 % | HEART RATE: 63 BPM | BODY MASS INDEX: 32.73 KG/M2

## 2018-01-25 DIAGNOSIS — Z98.890 POSTOPERATIVE EYE STATE: Primary | ICD-10-CM

## 2018-01-25 RX ORDER — ONDANSETRON 2 MG/ML
4 INJECTION INTRAMUSCULAR; INTRAVENOUS EVERY 30 MIN PRN
Status: DISCONTINUED | OUTPATIENT
Start: 2018-01-25 | End: 2018-01-26 | Stop reason: HOSPADM

## 2018-01-25 RX ORDER — SODIUM CHLORIDE, SODIUM LACTATE, POTASSIUM CHLORIDE, CALCIUM CHLORIDE 600; 310; 30; 20 MG/100ML; MG/100ML; MG/100ML; MG/100ML
INJECTION, SOLUTION INTRAVENOUS CONTINUOUS
Status: DISCONTINUED | OUTPATIENT
Start: 2018-01-25 | End: 2018-01-26 | Stop reason: HOSPADM

## 2018-01-25 RX ORDER — LIDOCAINE HYDROCHLORIDE 20 MG/ML
INJECTION, SOLUTION INFILTRATION; PERINEURAL PRN
Status: DISCONTINUED | OUTPATIENT
Start: 2018-01-25 | End: 2018-01-25

## 2018-01-25 RX ORDER — HYDROCODONE BITARTRATE AND ACETAMINOPHEN 5; 325 MG/1; MG/1
1 TABLET ORAL
Status: DISCONTINUED | OUTPATIENT
Start: 2018-01-25 | End: 2018-01-26 | Stop reason: HOSPADM

## 2018-01-25 RX ORDER — SODIUM CHLORIDE, SODIUM LACTATE, POTASSIUM CHLORIDE, CALCIUM CHLORIDE 600; 310; 30; 20 MG/100ML; MG/100ML; MG/100ML; MG/100ML
INJECTION, SOLUTION INTRAVENOUS CONTINUOUS
Status: DISCONTINUED | OUTPATIENT
Start: 2018-01-25 | End: 2018-01-25 | Stop reason: HOSPADM

## 2018-01-25 RX ORDER — NEOMYCIN SULFATE, POLYMYXIN B SULFATE AND DEXAMETHASONE 3.5; 10000; 1 MG/ML; [USP'U]/ML; MG/ML
SUSPENSION/ DROPS OPHTHALMIC
Qty: 1 BOTTLE | Refills: 0 | Status: SHIPPED | OUTPATIENT
Start: 2018-01-25 | End: 2018-02-07

## 2018-01-25 RX ORDER — TETRACAINE HYDROCHLORIDE 5 MG/ML
SOLUTION OPHTHALMIC PRN
Status: DISCONTINUED | OUTPATIENT
Start: 2018-01-25 | End: 2018-01-25 | Stop reason: HOSPADM

## 2018-01-25 RX ORDER — PROPOFOL 10 MG/ML
INJECTION, EMULSION INTRAVENOUS PRN
Status: DISCONTINUED | OUTPATIENT
Start: 2018-01-25 | End: 2018-01-25

## 2018-01-25 RX ORDER — NALOXONE HYDROCHLORIDE 0.4 MG/ML
.1-.4 INJECTION, SOLUTION INTRAMUSCULAR; INTRAVENOUS; SUBCUTANEOUS
Status: DISCONTINUED | OUTPATIENT
Start: 2018-01-25 | End: 2018-01-26 | Stop reason: HOSPADM

## 2018-01-25 RX ORDER — GABAPENTIN 100 MG/1
100 CAPSULE ORAL ONCE
Status: COMPLETED | OUTPATIENT
Start: 2018-01-25 | End: 2018-01-25

## 2018-01-25 RX ORDER — HYDROCODONE BITARTRATE AND ACETAMINOPHEN 5; 325 MG/1; MG/1
1 TABLET ORAL EVERY 6 HOURS PRN
Qty: 10 TABLET | Refills: 0 | Status: SHIPPED | OUTPATIENT
Start: 2018-01-25 | End: 2018-02-07

## 2018-01-25 RX ORDER — ACETAMINOPHEN 325 MG/1
975 TABLET ORAL ONCE
Status: COMPLETED | OUTPATIENT
Start: 2018-01-25 | End: 2018-01-25

## 2018-01-25 RX ORDER — ERYTHROMYCIN 5 MG/G
OINTMENT OPHTHALMIC PRN
Status: DISCONTINUED | OUTPATIENT
Start: 2018-01-25 | End: 2018-01-25 | Stop reason: HOSPADM

## 2018-01-25 RX ORDER — ERYTHROMYCIN 5 MG/G
OINTMENT OPHTHALMIC
Qty: 3.5 G | Refills: 0 | Status: SHIPPED | OUTPATIENT
Start: 2018-01-25 | End: 2018-02-07

## 2018-01-25 RX ORDER — FENTANYL CITRATE 50 UG/ML
25-50 INJECTION, SOLUTION INTRAMUSCULAR; INTRAVENOUS
Status: DISCONTINUED | OUTPATIENT
Start: 2018-01-25 | End: 2018-01-26 | Stop reason: HOSPADM

## 2018-01-25 RX ORDER — LIDOCAINE 40 MG/G
CREAM TOPICAL
Status: DISCONTINUED | OUTPATIENT
Start: 2018-01-25 | End: 2018-01-25 | Stop reason: HOSPADM

## 2018-01-25 RX ORDER — ONDANSETRON 4 MG/1
4 TABLET, ORALLY DISINTEGRATING ORAL EVERY 30 MIN PRN
Status: DISCONTINUED | OUTPATIENT
Start: 2018-01-25 | End: 2018-01-26 | Stop reason: HOSPADM

## 2018-01-25 RX ADMIN — ACETAMINOPHEN 975 MG: 325 TABLET ORAL at 12:13

## 2018-01-25 RX ADMIN — PROPOFOL 10 MG: 10 INJECTION, EMULSION INTRAVENOUS at 13:29

## 2018-01-25 RX ADMIN — ERYTHROMYCIN 1 INCH: 5 OINTMENT OPHTHALMIC at 13:30

## 2018-01-25 RX ADMIN — GABAPENTIN 100 MG: 100 CAPSULE ORAL at 12:13

## 2018-01-25 RX ADMIN — PROPOFOL 40 MG: 10 INJECTION, EMULSION INTRAVENOUS at 13:19

## 2018-01-25 RX ADMIN — SODIUM CHLORIDE, SODIUM LACTATE, POTASSIUM CHLORIDE, CALCIUM CHLORIDE: 600; 310; 30; 20 INJECTION, SOLUTION INTRAVENOUS at 12:30

## 2018-01-25 RX ADMIN — TETRACAINE HYDROCHLORIDE 1 DROP: 5 SOLUTION OPHTHALMIC at 13:22

## 2018-01-25 RX ADMIN — LIDOCAINE HYDROCHLORIDE 60 MG: 20 INJECTION, SOLUTION INFILTRATION; PERINEURAL at 13:18

## 2018-01-25 RX ADMIN — PROPOFOL 20 MG: 10 INJECTION, EMULSION INTRAVENOUS at 13:20

## 2018-01-25 ASSESSMENT — LIFESTYLE VARIABLES: TOBACCO_USE: 0

## 2018-01-25 ASSESSMENT — COPD QUESTIONNAIRES: COPD: 0

## 2018-01-25 NOTE — BRIEF OP NOTE
[facility] Brief Operative Note    Pre-operative diagnosis: Basal Cell Carcinoma   Post-operative diagnosis Same   Procedure: Procedure(s):  Left Lower Eyelid Mohs Reconstruction - Wound Class: I-Clean   Surgeon: Claudia Melvin    Assistants(s):    Estimated blood loss: Less than 10 mL   Specimens: None   Findings: As expected

## 2018-01-25 NOTE — DISCHARGE INSTRUCTIONS
Post-operative Instructions  Ophthalmic Plastic and Reconstructive Surgery    Claudia Melvin M.D.     All instructions apply to the operated eye(s) or eyelid(s).    Wound care and personal care  ? If a patch or bandage has been placed, please leave this in place until seen by your physician. Ensure that the bandage does not get wet when you take a shower.  ? Apply ice compresses 15 minutes on 15 minutes off while awake for 2 days, then switch to warm water compresses 4 times a day until seen by your physician. For warm packs you can place a cup of dry uncooked rice in a clean cotton sock. Then place sock in microwave 30 seconds to one minute. Next place the warm sock into a plastic bag and wrap the bag with clean warm wet washcloth and place over operated eye.    ? You may shower or wash your hair the day after surgery. Do not bathe or go swimming for 1 week to prevent contamination of your wounds.  ? Do not apply make-up to the eyes or eyelids for 2 weeks after surgery.  ? Expect some swelling, bruising, black eye (even into the lower eyelids and cheeks). Also expect serum caking, crusting and discharge from the eye and/or incisions. A small amount of surface bleeding is normal for the first 48 hours.  ? Your eye(s) and eyelid(s) may be painful and tender. This is normal after surgery.  Use the pain medication as prescribed. If your pain does not improve despite the  medication, contact the office.    Contact information and follow-up  ? Return to the Eye Clinic for a follow-up appointment with your physician as  scheduled. If no appointment has been scheduled:   - [facility] eye clinic: [phone] for an appointment with Dr. Melvin within 1 to 2 weeks from your date of surgery.   -  [facility] eye clinic: [phone] for an appointment with Dr. Melvin within 1 to 2 weeks from your date of surgery.     ? For severe pain, bleeding, or loss of vision, call the [facility]  [place] [facility] at [phone] or [facility] at [phone].     After hours or on weekends and holidays, call [phone] and ask to speak with the ophthalmologist on call.    An on call person can be reached after hours for concerns. The on call doctor should not call in medication refill requests after hours or on weekends, so please plan accordingly. An effort has been made to provide adequate pain medications following every surgery, and refills will not be provided in most instances. Narcotic pain medications cannot be called in.     Activity restrictions and driving  ? Avoid heavy lifting, bending, exercise or strenuous activity for 1 week after surgery.  You may resume other activities and return to work as tolerated.  ? You may not resume driving until have you stopped using narcotic pain medications (such as Norco, Percocet, Tylenol #3).    Medications  ? Restart all your regular home medications and eye drops. If you take Plavix or  Aspirin on a regular basis, wait for 72 hours after your surgery before restarting these in order to decrease the risk of bleeding complications.  ? Avoid aspirin and aspirin-like medications (Motrin, Aleve, Ibuprofen, Adrienne-  [place] etc) for 72 hours to reduce the risk of bleeding. You may take Tylenol  (acetaminophen) for pain.  ? In addition to your home medications, take the following post-operative medications as prescribed by your physician.    ? Apply antibiotic ointment to all sutures three times a day, and into the operated eye(s) at night.  ? Instill eye drops 3 times a day for 10 days.   ? Take pain pills at prescribed frequency as needed for pain.    ? WARNING: All the prescription pain medications listed above contain Tylenol  (acetaminophen). You must not take more than 4,000 mg of acetaminophen per  24-hour period. This is equivalent to 6 tablets of Darvocet, 12 tablets of Norco, Percocet or Tylenol #3. If you take other over-the-counter  medications containing acetaminophen, you must take the amount of acetaminophen into account and reduce the number of prescribed pain pills accordingly.  ? The prescribed medications may make you drowsy. You must not drive a car,  operate heavy machinery or drink alcohol while taking them.  ? The prescribed pain medications may cause constipation and nausea. Take them  with some food to prevent a stomach upset. If you continue to experience nausea,  call your physician.    [facility]  Home Care Following Anesthesia  For 24 hours after surgery:  1. Get plenty of rest.  A responsible adult must stay with you for at least 24 hours after you leave the surgery center.  2. Do not drive or use heavy equipment.  If you have weakness or tingling, don't drive or use heavy equipment until this feeling goes away.   3. Do not drink alcohol.   4. Avoid strenuous or risky activities.  Ask for help when climbing stairs.  5. You may feel lightheaded.  IF so, sit for a few minutes before standing.  Have someone help you get up.   6. If you have nausea (feel sick to your stomach): Drink only clear liquids such as apple juice, ginger ale, broth or 7-Up.  Rest may also help.  Be sure to drink enough fluids.  Move to a regular diet as you feel able.   7. You may have a slight fever.  Call the doctor if your fever is over 100 F (37.7 C) (taken under the tongue) or lasts longer than 24 hours.  8. You may have a dry mouth, a sore throat, muscle aches or trouble sleeping. These should go away after 24 hours.  9. Do not make important or legal decisions.               Tips for taking pain medications  To get the best pain relief possible, remember these points:    Take pain medications as directed, before pain becomes severe.    Pain medication can upset your stomach: taking it with food may help.    Constipation is a common side effect of pain medication. Drink plenty of  fluids.    Eat foods high in  fiber. Take a stool softener if recommended by your doctor or pharmacist.    Do not drink alcohol, drive or operate machinery while taking pain medications.    Ask about other ways to control pain, such as with heat, ice or relaxation.    Tylenol/Acetaminophen Consumption  To help encourage the safe use of acetaminophen, the makers of TYLENOL  have lowered the maximum daily dose for single-ingredient Extra Strength TYLENOL  (acetaminophen) products sold in the [place] from 8 pills per day (4,000 mg) to 6 pills per day (3,000 mg). The dosing interval has also changed from 2 pills every 4-6 hours to 2 pills every 6 hours.    If you feel your pain relief is insufficient, you may take Tylenol/Acetaminophen in addition to your narcotic pain medication.     Be careful not to exceed 3,000 mg of Tylenol/Acetaminophen in a 24 hour period from all sources.    If you are taking extra strength Tylenol/acetaminophen (500 mg), the maximum dose is 6 tablets in 24 hours.    If you are taking regular strength acetaminophen (325 mg), the maximum dose is 9 tablets in 24 hours.    Call a doctor for any of the followin. Signs of infection (fever, growing tenderness at the surgery site, a large amount of drainage or bleeding, severe pain, foul-smelling drainage, redness, swelling).  2. It has been over 8 to 10 hours since surgery and you are still not able to urinate (pass water).  3. Headache for over 24 hours.  4. Numbness, tingling or weakness the day after surgery (if you had spinal anesthesia).  Your doctor is:       Dr. Claudia Melvin, Ophthalmology: [phone]               Or dial [phone] and ask for the resident on call for:  Ophthalmology  For emergency care, call the:  [place]:  [phone] (TTY for hearing impaired: [phone])

## 2018-01-25 NOTE — OP NOTE
PREOPERATIVE DIAGNOSIS:  Left lower eyelid defect following Mohs resection of a basal cell carcinoma.      POSTOPERATIVE DIAGNOSIS:  Left lower eyelid defect following Mohs resection of a basal cell carcinoma.      PROCEDURE PERFORMED:  Reconstruction of left lower eyelid with Tenzel myocutaneous flap, reconstruction of eyelid margin with full-thickness eyelid margin defect and lateral canthopexy.      SURGEON:  Claudia Melvin MD     ASSISTANTS: none      ANESTHESIA:  Monitored with local infiltration of a 50/50 mixture of 2% lidocaine with epinephrine and 0.5% Marcaine.      COMPLICATIONS:  None.      ESTIMATED BLOOD LOSS:  Less than 5 mL.      HISTORY:  Billie Aden  presented with left lower eyelid defect following Mohs surgery earlier today.  After the risks, benefits and alternatives to the proposed procedure were explained, informed consent was obtained.      DESCRIPTION OF PROCEDURE:  Billie Aden was brought to the operating room and placed supine on the operating table.  IV sedation was given.  The left  lower lid and lateral canthal area were infiltrated with local anesthetic.  The area was prepped and draped in the typical sterile ophthalmic fashion. Lateral canthal rotational flap was drawn and incised with a 15-blade.  Dissection was carried down to the orbicularis with high temperature cautery.  Lateral canthotomy and inferior cantholysis was performed.  The skin in the lower lid defect was widely undermined.  The lower lid was rotated medially and a full-thickness reconstruction performed.  A 6-0 Vicryl suture was used to reconstruct the margin in a vertical mattress internally tied fashion.  Posterior lamella was closed with interrupted partial thickness 5-0 Vicryl sutures.  The skin was secured with interrupted 6-0 Vicryl and 6-0 plain gut sutures.  Lateral canthopexy of 5-0 Vicryl was used to secure the lateral portion of the flap to the lateral canthal angle.  Lateral canthal angle was then  closed with a 5-0 Vicryl suture.  The lateral canthal tissue was closed with interrupted 5-0 Vicryl sutures deep and running 6-0 plain gut suture to close the skin.  Erythromycin ophthalmic ointment was applied.  The patient tolerated the procedure well.  He left the operating room in stable condition.       Claudia Melvin MD

## 2018-01-25 NOTE — ANESTHESIA POSTPROCEDURE EVALUATION
Patient: Billie T Markie    Procedure(s):  Left Lower Eyelid Mohs Reconstruction - Wound Class: I-Clean    Diagnosis:Basal Cell Carcinoma  Diagnosis Additional Information: No value filed.    Anesthesia Type:  MAC    Note:  Anesthesia Post Evaluation    Patient location during evaluation: Phase 2  Patient participation: Able to fully participate in evaluation  Level of consciousness: awake and alert  Pain management: adequate  Airway patency: patent  Cardiovascular status: acceptable  Respiratory status: acceptable  Hydration status: acceptable  PONV: none     Anesthetic complications: None          Last vitals:  Vitals:    01/25/18 1154 01/25/18 1229 01/25/18 1345   BP: (!) 171/104 (!) 160/108 126/80   Pulse: 63     Resp: 18  16   Temp: 36.2  C (97.1  F)  36.8  C (98.2  F)   SpO2: 96%  94%         Electronically Signed By: Magdy Riggs MD  January 25, 2018  2:17 PM

## 2018-01-25 NOTE — ANESTHESIA CARE TRANSFER NOTE
Patient: Billie T Markie    Procedure(s):  Left Lower Eyelid Mohs Reconstruction - Wound Class: I-Clean    Diagnosis: Basal Cell Carcinoma  Diagnosis Additional Information: No value filed.    Anesthesia Type:   MAC     Note:  Airway :Room Air  Patient transferred to:Phase II  Comments: Arrive Phase II, Stable, Airway Intact  126/80, 78,16,93,98  All questions answered.      Vitals: (Last set prior to Anesthesia Care Transfer)    CRNA VITALS  1/25/2018 1306 - 1/25/2018 1342      1/25/2018             Pulse: 78    Ht Rate: 78    SpO2: 92 %    Resp Rate (set): 10                Electronically Signed By: MARÍA ELENA Isidro CRNA  January 25, 2018  1:42 PM

## 2018-01-25 NOTE — IP AVS SNAPSHOT
MRN:[id number]                      After Visit Summary   1/25/2018    Billie Aden    MRN: [id number]           Thank you!     Thank you for choosing [facility] for your care. Our goal is always to provide you with excellent care. Hearing back from our patients is one way we can continue to improve our services. Please take a few minutes to complete the written survey that you may receive in the mail after you visit with us. Thank you!        Patient Information     Date Of Birth          1955        About your hospital stay     You were admitted on:  January 25, 2018 [facility]    You were discharged on:  January 25, 2018       Who to Call     For medical emergencies, please call 911.  For non-urgent questions about your medical care, please call your primary care provider or clinic, [phone]  For questions related to your surgery, please call your surgery clinic        Attending Provider     Provider Claudia Savage MD Ophthalmology       Primary Care Provider Office Phone # Fax #    Jacob Graham [phone]      After Care Instructions     Discharge Medication Instructions       Do NOT take aspirin or medications containing NSAIDS for 72 hours after procedure.            Ice to affected area       Apply cold pack for 15 minutes on, 15 minutes off, for 48 hours while awake.                  Your next 10 appointments already scheduled     Feb 07, 2018 10:00 AM CST   [facility] with Claudia Melvin MD   [facility]    [address] [place] [id number] [phone]              Further instructions from your care team       Post-operative Instructions  Ophthalmic Plastic and Reconstructive Surgery    Claudia Melvin M.D.     All instructions apply to the operated eye(s) or eyelid(s).    Wound care and personal care  ? If  a patch or bandage has been placed, please leave this in place until seen by your physician. Ensure that the bandage does not get wet when you take a shower.  ? Apply ice compresses 15 minutes on 15 minutes off while awake for 2 days, then switch to warm water compresses 4 times a day until seen by your physician. For warm packs you can place a cup of dry uncooked rice in a clean cotton sock. Then place sock in microwave 30 seconds to one minute. Next place the warm sock into a plastic bag and wrap the bag with clean warm wet washcloth and place over operated eye.    ? You may shower or wash your hair the day after surgery. Do not bathe or go swimming for 1 week to prevent contamination of your wounds.  ? Do not apply make-up to the eyes or eyelids for 2 weeks after surgery.  ? Expect some swelling, bruising, black eye (even into the lower eyelids and cheeks). Also expect serum caking, crusting and discharge from the eye and/or incisions. A small amount of surface bleeding is normal for the first 48 hours.  ? Your eye(s) and eyelid(s) may be painful and tender. This is normal after surgery.  Use the pain medication as prescribed. If your pain does not improve despite the  medication, contact the office.    Contact information and follow-up  ? Return to the Eye Clinic for a follow-up appointment with your physician as  scheduled. If no appointment has been scheduled:   - [facility] eye clinic: [phone] for an appointment with Dr. Melvin within 1 to 2 weeks from your date of surgery.   -  [facility] eye clinic: [phone] for an appointment with Dr. Melvin within 1 to 2 weeks from your date of surgery.     ? For severe pain, bleeding, or loss of vision, call the [facility] at [phone] or [facility] at [phone].     After hours or on weekends and holidays, call [phone] and ask to speak with the ophthalmologist on call.    An on   can be reached after hours for concerns. The on call doctor should not call in medication refill requests after hours or on weekends, so please plan accordingly. An effort has been made to provide adequate pain medications following every surgery, and refills will not be provided in most instances. Narcotic pain medications cannot be called in.     Activity restrictions and driving  ? Avoid heavy lifting, bending, exercise or strenuous activity for 1 week after surgery.  You may resume other activities and return to work as tolerated.  ? You may not resume driving until have you stopped using narcotic pain medications (such as Norco, Percocet, Tylenol #3).    Medications  ? Restart all your regular home medications and eye drops. If you take Plavix or  Aspirin on a regular basis, wait for 72 hours after your surgery before restarting these in order to decrease the risk of bleeding complications.  ? Avoid aspirin and aspirin-like medications (Motrin, Aleve, Ibuprofen, Adrienne-  [place] etc) for 72 hours to reduce the risk of bleeding. You may take Tylenol  (acetaminophen) for pain.  ? In addition to your home medications, take the following post-operative medications as prescribed by your physician.    ? Apply antibiotic ointment to all sutures three times a day, and into the operated eye(s) at night.  ? Instill eye drops 3 times a day for 10 days.   ? Take pain pills at prescribed frequency as needed for pain.    ? WARNING: All the prescription pain medications listed above contain Tylenol  (acetaminophen). You must not take more than 4,000 mg of acetaminophen per  24-hour period. This is equivalent to 6 tablets of Darvocet, 12 tablets of Norco, Percocet or Tylenol #3. If you take other over-the-counter medications containing acetaminophen, you must take the amount of acetaminophen into account and reduce the number of prescribed pain pills accordingly.  ? The prescribed medications may make you drowsy. You  must not drive a car,  operate heavy machinery or drink alcohol while taking them.  ? The prescribed pain medications may cause constipation and nausea. Take them  with some food to prevent a stomach upset. If you continue to experience nausea,  call your physician.    [facility]  Home Care Following Anesthesia  For 24 hours after surgery:  1. Get plenty of rest.  A responsible adult must stay with you for at least 24 hours after you leave the surgery center.  2. Do not drive or use heavy equipment.  If you have weakness or tingling, don't drive or use heavy equipment until this feeling goes away.   3. Do not drink alcohol.   4. Avoid strenuous or risky activities.  Ask for help when climbing stairs.  5. You may feel lightheaded.  IF so, sit for a few minutes before standing.  Have someone help you get up.   6. If you have nausea (feel sick to your stomach): Drink only clear liquids such as apple juice, ginger ale, broth or 7-Up.  Rest may also help.  Be sure to drink enough fluids.  Move to a regular diet as you feel able.   7. You may have a slight fever.  Call the doctor if your fever is over 100 F (37.7 C) (taken under the tongue) or lasts longer than 24 hours.  8. You may have a dry mouth, a sore throat, muscle aches or trouble sleeping. These should go away after 24 hours.  9. Do not make important or legal decisions.               Tips for taking pain medications  To get the best pain relief possible, remember these points:    Take pain medications as directed, before pain becomes severe.    Pain medication can upset your stomach: taking it with food may help.    Constipation is a common side effect of pain medication. Drink plenty of  fluids.    Eat foods high in fiber. Take a stool softener if recommended by your doctor or pharmacist.    Do not drink alcohol, drive or operate machinery while taking pain medications.    Ask about other ways to control pain, such as with  heat, ice or relaxation.    Tylenol/Acetaminophen Consumption  To help encourage the safe use of acetaminophen, the makers of TYLENOL  have lowered the maximum daily dose for single-ingredient Extra Strength TYLENOL  (acetaminophen) products sold in the [place] from 8 pills per day (4,000 mg) to 6 pills per day (3,000 mg). The dosing interval has also changed from 2 pills every 4-6 hours to 2 pills every 6 hours.    If you feel your pain relief is insufficient, you may take Tylenol/Acetaminophen in addition to your narcotic pain medication.     Be careful not to exceed 3,000 mg of Tylenol/Acetaminophen in a 24 hour period from all sources.    If you are taking extra strength Tylenol/acetaminophen (500 mg), the maximum dose is 6 tablets in 24 hours.    If you are taking regular strength acetaminophen (325 mg), the maximum dose is 9 tablets in 24 hours.    Call a doctor for any of the followin. Signs of infection (fever, growing tenderness at the surgery site, a large amount of drainage or bleeding, severe pain, foul-smelling drainage, redness, swelling).  2. It has been over 8 to 10 hours since surgery and you are still not able to urinate (pass water).  3. Headache for over 24 hours.  4. Numbness, tingling or weakness the day after surgery (if you had spinal anesthesia).  Your doctor is:       Dr. Claudia Melvin, Ophthalmology: [phone]               Or dial [phone] and ask for the resident on call for:  Ophthalmology  For emergency care, call the:  [facility]:  [phone] (TTY for hearing impaired: [phone])                Pending Results     No orders found from 2018 to 2018.            Admission Information     Date & Time Provider Department Dept. Phone    2018 Claudia Melvin MD [facility] [phone]      Your Vitals Were     Blood Pressure Pulse Temperature Respirations Height Weight    160/108 63 97.1  F (36.2  C) (Oral) 18  "1.74 m (5' 8.5\") 100.2 kg (221 lb)    Pulse Oximetry BMI (Body Mass Index)                96% 33.11 kg/m2          [email or web address] Information     [email or web address] gives you secure access to your electronic health record. If you see a primary care provider, you can also send messages to your care team and make appointments. If you have questions, please call your primary care clinic.  If you do not have a primary care provider, please call [phone] and they will assist you.      [email or web address] is an electronic gateway that provides easy, online access to your medical records. With [email or web address], you can request a clinic appointment, read your test results, renew a prescription or communicate with your care team.     To access your existing account, please contact your [facility] or call [phone] for assistance.        Care EveryWhere ID     This is your Care EveryWhere ID. This could be used by other organizations to access your [facility] medical records  [id number]        Equal Access to Services     TIFFANIE DOE : Hadii tin reyezo Somalik, waaxda luchichi, qaybta kaalmadru adejhon, chelo noland. So [facility] [phone].    ATENCIÓN: Si habla altagracia, tiene a bledsoe disposición servicios gratuitos de asistencia lingüística. Llame al [phone].    We comply with applicable federal civil rights laws and [place] laws. We do not discriminate on the basis of race, color, national origin, age, disability, sex, sexual orientation, or gender identity.               Review of your medicines      START taking        Dose / Directions    erythromycin ophthalmic ointment   Commonly known as:  ROMYCIN   Used for:  Postoperative eye state        Apply small amount to incision site three times a day   Quantity:  3.5 g   Refills:  0       HYDROcodone-acetaminophen 5-325 MG per tablet   Commonly known as:  NORCO   Used for:  Postoperative eye state        Dose:  1 tablet   Take 1 tablet by " mouth every 6 hours as needed for pain Maximum of 4000 mg of acetaminophen in 24 hours.   Quantity:  10 tablet   Refills:  0       neomycin-polymyxin-dexamethasone 3.5-88615-5.1 Susp ophthalmic susp   Commonly known as:  MAXITROL   Used for:  Postoperative eye state        One drop to operated eye(s) three times daily for 10 days.   Quantity:  1 Bottle   Refills:  0         CONTINUE these medicines which have NOT CHANGED        Dose / Directions    aspirin 81 MG tablet   Used for:  Hyperlipidemia LDL goal <130        Dose:  81 mg   Take 1 tablet (81 mg) by mouth daily   Quantity:  90 tablet   Refills:  3       atorvastatin 20 MG tablet   Commonly known as:  LIPITOR   Used for:  Hyperlipidemia LDL goal <130        Dose:  20 mg   Take 1 tablet (20 mg) by mouth daily   Quantity:  90 tablet   Refills:  3       sildenafil 20 MG tablet   Commonly known as:  REVATIO   Used for:  Erectile dysfunction, unspecified erectile dysfunction type        Dose:  20-40 mg   Take 1-2 tablets (20-40 mg) by mouth daily as needed for erectile dysfunction Take 30 min to 4 hours before intercourse. ?Never use with nitroglycerin, terazosin or doxazosin   Quantity:  30 tablet   Refills:  3            Where to get your medicines      These medications were sent to [place], [place] - [facility] [address] [address]    Hours:  TRANSPLANT PHONE NUMBER [phone] Phone:  [phone]     erythromycin ophthalmic ointment    neomycin-polymyxin-dexamethasone 3.5-94920-7.1 Susp ophthalmic susp         Some of these will need a paper prescription and others can be bought over the counter. Ask your nurse if you have questions.     Bring a paper prescription for each of these medications     HYDROcodone-acetaminophen 5-325 MG per tablet                Protect others around you: Learn how to safely use, store and throw away your medicines at www.disposemymeds.org.         Information about OPIOIDS     PRESCRIPTION OPIOIDS: WHAT YOU NEED TO KNOW    Prescription opioids can be used to help relieve moderate to severe pain and are often prescribed following a surgery or injury, or for certain health conditions. These medications can be an important part of treatment but also come with serious risks. It is important to work with your health care provider to make sure you are getting the safest, most effective care.    WHAT ARE THE RISKS AND SIDE EFFECTS OF OPIOID USE?  Prescription opioids carry serious risks of addiction and overdose, especially with prolonged use. An opioid overdose, often marked by slowed breathing can cause sudden death. The use of prescription opioids can have a number of side effects as well, even when taken as directed:      Tolerance - meaning you might need to take more of a medication for the same pain relief    Physical dependence - meaning you have symptoms of withdrawal when a medication is stopped    Increased sensitivity to pain    Constipation    Nausea, vomiting, and dry mouth    Sleepiness and dizziness    Confusion    Depression    Low levels of testosterone that can result in lower sex drive, energy, and strength    Itching and sweating    RISKS ARE GREATER WITH:    History of drug misuse, substance use disorder, or overdose    Mental health conditions (such as depression or anxiety)    Sleep apnea    Older age (65 years or older)    Pregnancy    Avoid alcohol while taking prescription opioids.   Also, unless specifically advised by your health care provider, medications to avoid include:    Benzodiazepines (such as Xanax or Valium)    Muscle relaxants (such as Soma or Flexeril)    Hypnotics (such as Ambien or Lunesta)    Other prescription opioids    KNOW YOUR OPTIONS:  Talk to your health care provider about ways to manage your pain that do not involve prescription opioids. Some of these options may actually work better and have fewer risks and  side effects:    Pain relievers such as acetaminophen, ibuprofen, and naproxen    Some medications that are also used for depression or seizures    Physical therapy and exercise    Cognitive behavioral therapy, a psychological, goal-directed approach, in which patients learn how to modify physical, behavioral, and emotional triggers of pain and stress    IF YOU ARE PRESCRIBED OPIOIDS FOR PAIN:    Never take opioids in greater amounts or more often than prescribed    Follow up with your primary health care provider and work together to create a plan on how to manage your pain.    Talk about ways to help manage your pain that do not involve prescription opioids    Talk about all concerns and side effects    Help prevent misuse and abuse    Never sell or share prescription opioids    Never use another person's prescription opioids    Store prescription opioids in a secure place and out of reach of others (this may include visitors, children, friends, and family)    Visit www.cdc.gov/drugoverdose to learn about risks of opioid abuse and overdose    If you believe you may be struggling with addiction, tell your health care provider and ask for guidance or call [facility]'s National Helpline at 2-539-052-HELP    LEARN MORE / www.cdc.gov/drugoverdose/prescribing/guideline.html    Safely dispose of unused prescription opioids: Find your local drug take-back programs and more information about the importance of safe disposal at www.doseofreality.[place].gov             Medication List: This is a list of all your medications and when to take them. Check marks below indicate your daily home schedule. Keep this list as a reference.      Medications           Morning Afternoon Evening Bedtime As Needed    aspirin 81 MG tablet   Take 1 tablet (81 mg) by mouth daily                                atorvastatin 20 MG tablet   Commonly known as:  LIPITOR   Take 1 tablet (20 mg) by mouth daily                                erythromycin  ophthalmic ointment   Commonly known as:  ROMYCIN   Apply small amount to incision site three times a day   Last time this was given:  1 inch on 1/25/2018  1:30 PM                                HYDROcodone-acetaminophen 5-325 MG per tablet   Commonly known as:  NORCO   Take 1 tablet by mouth every 6 hours as needed for pain Maximum of 4000 mg of acetaminophen in 24 hours.                                neomycin-polymyxin-dexamethasone 3.5-72425-3.1 Susp ophthalmic susp   Commonly known as:  MAXITROL   One drop to operated eye(s) three times daily for 10 days.                                sildenafil 20 MG tablet   Commonly known as:  REVATIO   Take 1-2 tablets (20-40 mg) by mouth daily as needed for erectile dysfunction Take 30 min to 4 hours before intercourse. ?Never use with nitroglycerin, terazosin or doxazosin

## 2018-01-25 NOTE — ANESTHESIA PREPROCEDURE EVALUATION
Billie Aden is a 62 year old male with a PMH of  Basal Cell Carcinoma who is scheduled for Procedure(s):  Left Lower Eyelid Mohs Reconstruction - Wound Class: I-Clean    NPO Status: Adequate.  > 6 hours solids, > 2 hours clear liquids.       Past Surgical History:   Procedure Laterality Date     CHOLECYSTECTOMY       HC CLOSED TX SHOULDER DISLOC W MANIP NO ANESTH       LAPAROSCOPIC CHOLECYSTECTOMY N/A 12/23/2014    Procedure: LAPAROSCOPIC CHOLECYSTECTOMY;  Surgeon: Avelino Bryson MD;  Location: [facility]       Anesthesia Evaluation     . Pt has had prior anesthetic. Type: General    No history of anesthetic complications          ROS/MED HX    ENT/Pulmonary:      (-) tobacco use, asthma and COPD   Neurologic:      (-) CVA, TIA and Neuropathy   Cardiovascular:     (+) --CAD, --. : . . . :. .      (-) hypertension, past MI, irregular heartbeat/palpitations, stent, past MI and CABG   METS/Exercise Tolerance:  >4 METS   Hematologic:        (-) anemia   Musculoskeletal:         GI/Hepatic:        (-) GERD and liver disease   Renal/Genitourinary:      (-) renal disease   Endo:      (-) Type I DM, Type II DM and thyroid disease   Psychiatric:         Infectious Disease:  - neg infectious disease ROS       Malignancy:         Other:                     Physical Exam  Normal systems: cardiovascular, pulmonary and dental    Airway   Mallampati: II  TM distance: >3 FB  Neck ROM: full    Dental     Cardiovascular   Rhythm and rate: regular and normal      Pulmonary    breath sounds clear to auscultation                    Anesthesia Plan      History & Physical Review  History and physical reviewed and following examination; no interval change.    ASA Status:  2 .        Plan for MAC (with GA backup) with Intravenous induction. Maintenance will be TIVA.    PONV prophylaxis:  Ondansetron       Postoperative Care  Postoperative pain management:  IV analgesics.      Consents  Anesthetic plan, risks, benefits and alternatives  discussed with:  Patient (Including possibility of intraoperative awareness or recall.)..        Magdy Riggs MD  12:48 PM January 25, 2018                        .

## 2018-01-25 NOTE — IP AVS SNAPSHOT
[facility]    [address] [place] [id number]    Phone:  [phone]    Fax:  [phone]                                       After Visit Summary   1/25/2018    Billie Aden    MRN: [id number]           After Visit Summary Signature Page     I have received my discharge instructions, and my questions have been answered. I have discussed any challenges I see with this plan with the nurse or doctor.    ..........................................................................................................................................  Patient/Patient Representative Signature      ..........................................................................................................................................  Patient Representative Print Name and Relationship to Patient    ..................................................               ................................................  Date                                            Time    ..........................................................................................................................................  Reviewed by Signature/Title    ...................................................              ..............................................  Date                                                            Time

## 2018-01-26 ENCOUNTER — MEDICAL CORRESPONDENCE (OUTPATIENT)
Dept: HEALTH INFORMATION MANAGEMENT | Facility: CLINIC | Age: 63
End: 2018-01-26

## 2018-02-07 ENCOUNTER — OFFICE VISIT (OUTPATIENT)
Dept: OPHTHALMOLOGY | Facility: CLINIC | Age: 63
End: 2018-02-07
Payer: COMMERCIAL

## 2018-02-07 DIAGNOSIS — C44.111 BCC (BASAL CELL CARCINOMA), EYELID, LEFT: Primary | ICD-10-CM

## 2018-02-07 PROCEDURE — 99024 POSTOP FOLLOW-UP VISIT: CPT | Performed by: OPHTHALMOLOGY

## 2018-02-07 ASSESSMENT — VISUAL ACUITY
METHOD: SNELLEN - LINEAR
OD_CC: 20/20
CORRECTION_TYPE: GLASSES
OS_CC: 20/15

## 2018-02-07 NOTE — MR AVS SNAPSHOT
After Visit Summary   2/7/2018    Billie Aden    MRN: [id number]           Patient Information     Date Of Birth          1955        Visit Information        Provider Department      2/7/2018 10:00 AM Claudia Melvin MD [facility]         Follow-ups after your visit        Your next 10 appointments already scheduled     Mar 28, 2018 10:00 AM CDT   Return Visit with Claudia Melvin MD   [facility] ([facility])    [id number] [place] [address] [phone]              Who to contact     If you have questions or need follow up information about today's clinic visit or your schedule please contact [facility] directly at [phone].  Normal or non-critical lab and imaging results will be communicated to you by MyChart, letter or phone within 4 business days after the clinic has received the results. If you do not hear from us within 7 days, please contact the clinic through SwipeToSpinhart or phone. If you have a critical or abnormal lab result, we will notify you by phone as soon as possible.  Submit refill requests through [employer] or call your pharmacy and they will forward the refill request to us. Please allow 3 business days for your refill to be completed.          Additional Information About Your Visit        SwipeToSpinhart Information     [employer] gives you secure access to your electronic health record. If you see a primary care provider, you can also send messages to your care team and make appointments. If you have questions, please call your primary care clinic.  If you do not have a primary care provider, please call [phone] and they will assist you.      [employer] is an electronic gateway that provides easy, online access to your medical records. With [employer], you can request a clinic appointment, read your test results, renew a prescription or communicate with your care team.     To access your  existing account, please contact your [facility] or call [phone] for assistance.        Care EveryWhere ID     This is your Care EveryWhere ID. This could be used by other organizations to access your [facility] medical records  [id number]         Blood Pressure from Last 3 Encounters:   01/25/18 144/78   01/24/18 142/90   10/03/17 127/79    Weight from Last 3 Encounters:   01/25/18 100.2 kg (221 lb)   01/24/18 100.2 kg (221 lb)   10/03/17 99.8 kg (220 lb)              Today, you had the following     No orders found for display       Primary Care Provider Office Phone # Fax #    Jacob Graham [phone] [address] [address]        Equal Access to Services     JOSEPHINE DOE : Hadii aad ku hadasho Soomaali, waaxda luqadaha, qaybta kaalmada adeegyada, chelo jansen haydelaney kendrick . So [facility] [phone].    ATENCIÓN: Si habla español, tiene a bledsoe disposición servicios gratuitos de asistencia lingüística. Yanci al [phone].    We comply with applicable federal civil rights laws and [place] laws. We do not discriminate on the basis of race, color, national origin, age, disability, sex, sexual orientation, or gender identity.            Thank you!     Thank you for choosing [facility]  for your care. Our goal is always to provide you with excellent care. Hearing back from our patients is one way we can continue to improve our services. Please take a few minutes to complete the written survey that you may receive in the mail after your visit with us. Thank you!             Your Updated Medication List - Protect others around you: Learn how to safely use, store and throw away your medicines at www.disposemymeds.org.          This list is accurate as of 2/7/18 10:15 AM.  Always use your most recent med list.                   Brand Name Dispense Instructions for use Diagnosis    aspirin 81 MG tablet      90 tablet    Take 1 tablet (81 mg) by mouth daily    Hyperlipidemia LDL goal <130       atorvastatin 20 MG tablet    LIPITOR    90 tablet    Take 1 tablet (20 mg) by mouth daily    Hyperlipidemia LDL goal <130       sildenafil 20 MG tablet    REVATIO    30 tablet    Take 1-2 tablets (20-40 mg) by mouth daily as needed for erectile dysfunction Take 30 min to 4 hours before intercourse. ?Never use with nitroglycerin, terazosin or doxazosin    Erectile dysfunction, unspecified erectile dysfunction type

## 2018-02-07 NOTE — PROGRESS NOTES
Billie Aden is status post left Mohs reconstruction with tenzel.  Incision(s) healing well.  The lid(s)  is  in excellent position. He is pleased.    I have recommended:  * Continue antibiotic ointment or bland lubricating ointment (eg vaseline or aquaphor) to the incision site BID.  * Warm soaks 3-4x daily until all edema and ecchymoses resolve  * Return to clinic in 2 months if vicryl needs to be removed - previous procedures had trouble dissolving vicryl.      Attending Physician Attestation:  Complete documentation of historical and exam elements from today's encounter can be found in the full encounter summary report (not reduplicated in this progress note).  I personally obtained the chief complaint(s) and history of present illness.  I confirmed and edited as necessary the review of systems, past medical/surgical history, family history, social history, and examination findings as documented by others; and I examined the patient myself.  I personally reviewed the relevant tests, images, and reports as documented above.  I formulated and edited as necessary the assessment and plan and discussed the findings and management plan with the patient and family. - Claudia Melvin MD

## 2018-02-07 NOTE — NURSING NOTE
Patient presents with:  Post Op (Ophthalmology) Left Eye: left lower eyelid mohs reconstruction 1/25/18      Referring Provider:  No referring provider defined for this encounter.    HPI    Symptoms:              Comments:  Doing fine.  Pt denies any oint or gtts.  D/C yesterday.

## 2018-04-24 ENCOUNTER — TELEPHONE (OUTPATIENT)
Dept: FAMILY MEDICINE | Facility: CLINIC | Age: 63
End: 2018-04-24

## 2018-04-24 ENCOUNTER — OFFICE VISIT (OUTPATIENT)
Dept: FAMILY MEDICINE | Facility: CLINIC | Age: 63
End: 2018-04-24
Payer: COMMERCIAL

## 2018-04-24 VITALS
DIASTOLIC BLOOD PRESSURE: 91 MMHG | WEIGHT: 216.8 LBS | OXYGEN SATURATION: 95 % | BODY MASS INDEX: 32.11 KG/M2 | TEMPERATURE: 97.6 F | SYSTOLIC BLOOD PRESSURE: 150 MMHG | HEART RATE: 81 BPM | HEIGHT: 69 IN

## 2018-04-24 DIAGNOSIS — R05.9 COUGH: Primary | ICD-10-CM

## 2018-04-24 PROCEDURE — 99213 OFFICE O/P EST LOW 20 MIN: CPT | Performed by: NURSE PRACTITIONER

## 2018-04-24 RX ORDER — CODEINE PHOSPHATE AND GUAIFENESIN 10; 100 MG/5ML; MG/5ML
1-2 SOLUTION ORAL
Qty: 120 ML | Refills: 0 | Status: SHIPPED | OUTPATIENT
Start: 2018-04-24 | End: 2018-08-22

## 2018-04-24 NOTE — NURSING NOTE
"Chief Complaint   Patient presents with     URI       Initial BP (!) 150/91 (BP Location: Left arm, Patient Position: Chair, Cuff Size: Adult Large)  Pulse 81  Temp 97.6  F (36.4  C) (Oral)  Ht 5' 8.5\" (1.74 m)  Wt 216 lb 12.8 oz (98.3 kg)  SpO2 95%  BMI 32.48 kg/m2 Estimated body mass index is 32.48 kg/(m^2) as calculated from the following:    Height as of this encounter: 5' 8.5\" (1.74 m).    Weight as of this encounter: 216 lb 12.8 oz (98.3 kg).  Medication Reconciliation: complete.  Mariia Núñez CMA    "

## 2018-04-24 NOTE — MR AVS SNAPSHOT
"              After Visit Summary   4/24/2018    Billie Aden    MRN: [id number]           Patient Information     Date Of Birth          1955        Visit Information        Provider Department      4/24/2018 5:30 PM Yodit White APRN CNP [facility]        Today's Diagnoses     Cough    -  1       Follow-ups after your visit        Who to contact     If you have questions or need follow up information about today's clinic visit or your schedule please contact [facility] directly at [phone].  Normal or non-critical lab and imaging results will be communicated to you by SIL4 Systemshart, letter or phone within 4 business days after the clinic has received the results. If you do not hear from us within 7 days, please contact the clinic through Gentronixt or phone. If you have a critical or abnormal lab result, we will notify you by phone as soon as possible.  Submit refill requests through [email or web address] or call your pharmacy and they will forward the refill request to us. Please allow 3 business days for your refill to be completed.          Additional Information About Your Visit        MyChart Information     [email or web address] gives you secure access to your electronic health record. If you see a primary care provider, you can also send messages to your care team and make appointments. If you have questions, please call your primary care clinic.  If you do not have a primary care provider, please call [phone] and they will assist you.        Care EveryWhere ID     This is your Care EveryWhere ID. This could be used by other organizations to access your [facility] medical records  [id number]        Your Vitals Were     Pulse Temperature Height Pulse Oximetry BMI (Body Mass Index)       81 97.6  F (36.4  C) (Oral) 5' 8.5\" (1.74 m) 95% 32.48 kg/m2        Blood Pressure from Last 3 Encounters:   04/24/18 (!) 150/91   01/25/18 144/78   01/24/18 142/90    Weight from Last 3 Encounters:   04/24/18 216 lb " 12.8 oz (98.3 kg)   01/25/18 221 lb (100.2 kg)   01/24/18 221 lb (100.2 kg)              Today, you had the following     No orders found for display         Today's Medication Changes          These changes are accurate as of 4/24/18  5:44 PM.  If you have any questions, ask your nurse or doctor.               Start taking these medicines.        Dose/Directions    guaiFENesin-codeine 100-10 MG/5ML Soln solution   Commonly known as:  ROBITUSSIN AC   Used for:  Cough   Started by:  Yodit White APRN CNP        Dose:  1-2 tsp.   Take 5-10 mLs by mouth nightly as needed for cough   Quantity:  120 mL   Refills:  0            Where to get your medicines      Some of these will need a paper prescription and others can be bought over the counter.  Ask your nurse if you have questions.     Bring a paper prescription for each of these medications     guaiFENesin-codeine 100-10 MG/5ML Soln solution                Primary Care Provider Office Phone # Fax #    Jacob Graham [phone] [address]  [facility] [address]        Equal Access to Services     [facility]: Hadii aad ku hadasho Somalik, waaxda luqadaha, qaybta kaalmada adeegyada, chelo kendrick . So [facility] [phone].    ATENCIÓN: Si habla español, tiene a bledsoe disposición servicios gratuitos de asistencia lingüística. Llame al [phone].    We comply with applicable federal civil rights laws and [place] laws. We do not discriminate on the basis of race, color, national origin, age, disability, sex, sexual orientation, or gender identity.            Thank you!     Thank you for choosing [facility]  for your care. Our goal is always to provide you with excellent care. Hearing back from our patients is one way we can continue to improve our services. Please take a few minutes to complete the written survey that you may receive in the mail after your visit with us. Thank you!              Your Updated Medication List - Protect others around you: Learn how to safely use, store and throw away your medicines at www.disposemymeds.org.          This list is accurate as of 4/24/18  5:44 PM.  Always use your most recent med list.                   Brand Name Dispense Instructions for use Diagnosis    aspirin 81 MG tablet     90 tablet    Take 1 tablet (81 mg) by mouth daily    Hyperlipidemia LDL goal <130       atorvastatin 20 MG tablet    LIPITOR    90 tablet    Take 1 tablet (20 mg) by mouth daily    Hyperlipidemia LDL goal <130       guaiFENesin-codeine 100-10 MG/5ML Soln solution    ROBITUSSIN AC    120 mL    Take 5-10 mLs by mouth nightly as needed for cough    Cough       sildenafil 20 MG tablet    REVATIO    30 tablet    Take 1-2 tablets (20-40 mg) by mouth daily as needed for erectile dysfunction Take 30 min to 4 hours before intercourse. ?Never use with nitroglycerin, terazosin or doxazosin    Erectile dysfunction, unspecified erectile dysfunction type

## 2018-04-24 NOTE — PROGRESS NOTES
SUBJECTIVE:   Billie Aden is a 62 year old male who presents to clinic today for the following health issues:      RESPIRATORY SYMPTOMS      Duration: 1 week    Description  Dry cough    Severity: mild    Accompanying signs and symptoms: None, no fever or chills, no sweats, no SOB or wheeze    History (predisposing factors): gets this every year just needs some robitussin for nighttime cough so he can sleep    Precipitating or alleviating factors: None    Therapies tried and outcome:  guaifenesin      Problem list and histories reviewed & adjusted, as indicated.  Additional history: as documented    Patient Active Problem List   Diagnosis     Hyperlipidemia LDL goal <130     CKD (chronic kidney disease) stage 3, GFR 30-59 ml/min     IFG (impaired fasting glucose)     Hyperbilirubinemia     Nephrolithiasis     Coronary artery calcification     Past Surgical History:   Procedure Laterality Date     CHOLECYSTECTOMY       HC CLOSED TX SHOULDER DISLOC W MANIP NO ANESTH       LAPAROSCOPIC CHOLECYSTECTOMY N/A 12/23/2014    Procedure: LAPAROSCOPIC CHOLECYSTECTOMY;  Surgeon: Avelino Bryson MD;  Location:  [facility]     REPAIR MOHS Left 1/25/2018    Procedure: REPAIR MOHS;  Left Lower Eyelid Mohs Reconstruction;  Surgeon: Claudia Melvin MD;  Location:  [facility]       Social History   Substance Use Topics     Smoking status: Never Smoker     Smokeless tobacco: Never Used     Alcohol use 0.0 oz/week     0 Standard drinks or equivalent per week      Comment: occasionally     Family History   Problem Relation Age of Onset     Cancer - colorectal Mother 79     Cardiovascular Father      carotid stenosis     HEART DISEASE Brother 66         Current Outpatient Prescriptions   Medication Sig Dispense Refill     aspirin 81 MG tablet Take 1 tablet (81 mg) by mouth daily 90 tablet 3     atorvastatin (LIPITOR) 20 MG tablet Take 1 tablet (20 mg) by mouth daily 90 tablet 3     sildenafil (REVATIO/VIAGRA) 20 MG tablet Take 1-2 tablets  "(20-40 mg) by mouth daily as needed for erectile dysfunction Take 30 min to 4 hours before intercourse.  Never use with nitroglycerin, terazosin or doxazosin 30 tablet 3     Allergies   Allergen Reactions     Dilaudid [Hydromorphone]      Agitation, did not help with pain     Morphine Anxiety       Reviewed and updated as needed this visit by clinical staff       Reviewed and updated as needed this visit by Provider         ROS:  Constitutional, HEENT, cardiovascular, pulmonary, gi and gu systems are negative, except as otherwise noted.    OBJECTIVE:     BP (!) 150/91 (BP Location: Left arm, Patient Position: Chair, Cuff Size: Adult Large)  Pulse 81  Temp 97.6  F (36.4  C) (Oral)  Ht 5' 8.5\" (1.74 m)  Wt 216 lb 12.8 oz (98.3 kg)  SpO2 95%  BMI 32.48 kg/m2  Body mass index is 32.48 kg/(m^2).  GENERAL: healthy, alert and no distress  EYES: Eyes grossly normal to inspection, PERRL and conjunctivae and sclerae normal  HENT: ear canals and TM's normal, nose and mouth without ulcers or lesions  NECK: no adenopathy, no asymmetry, masses, or scars and thyroid normal to palpation  RESP: lungs clear to auscultation - no rales, rhonchi or wheezes  CV: regular rate and rhythm, normal S1 S2, no S3 or S4, no murmur, click or rub,     Diagnostic Test Results:  none     ASSESSMENT/PLAN:         ICD-10-CM    1. Cough R05 guaiFENesin-codeine (ROBITUSSIN AC) 100-10 MG/5ML SOLN solution   also push fluids  Steam heat      MARÍA ELENA Ortega [facility]    "

## 2018-05-03 ENCOUNTER — ALLIED HEALTH/NURSE VISIT (OUTPATIENT)
Dept: FAMILY MEDICINE | Facility: CLINIC | Age: 63
End: 2018-05-03
Payer: COMMERCIAL

## 2018-05-03 VITALS — DIASTOLIC BLOOD PRESSURE: 84 MMHG | SYSTOLIC BLOOD PRESSURE: 138 MMHG

## 2018-05-03 DIAGNOSIS — Z01.30 BP CHECK: Primary | ICD-10-CM

## 2018-05-03 PROCEDURE — 99207 ZZC NO CHARGE NURSE ONLY: CPT | Performed by: INTERNAL MEDICINE

## 2018-05-03 NOTE — MR AVS SNAPSHOT
After Visit Summary   5/3/2018    Billie Aden    MRN: [id number]           Patient Information     Date Of Birth          1955        Visit Information        Provider Department      5/3/2018 11:52 AM Jacob Graham MD [facility]        Today's Diagnoses     BP check    -  1       Follow-ups after your visit        Who to contact     If you have questions or need follow up information about today's clinic visit or your schedule please contact [facility] directly at [phone].  Normal or non-critical lab and imaging results will be communicated to you by Simplifyhart, letter or phone within 4 business days after the clinic has received the results. If you do not hear from us within 7 days, please contact the clinic through InnerWorkingst or phone. If you have a critical or abnormal lab result, we will notify you by phone as soon as possible.  Submit refill requests through [email or web address] or call your pharmacy and they will forward the refill request to us. Please allow 3 business days for your refill to be completed.          Additional Information About Your Visit        MyChart Information     [email or web address] gives you secure access to your electronic health record. If you see a primary care provider, you can also send messages to your care team and make appointments. If you have questions, please call your primary care clinic.  If you do not have a primary care provider, please call [phone] and they will assist you.        Care EveryWhere ID     This is your Care EveryWhere ID. This could be used by other organizations to access your [facility] medical records  [id number]         Blood Pressure from Last 3 Encounters:   05/03/18 138/84   04/24/18 (!) 150/91   01/25/18 144/78    Weight from Last 3 Encounters:   04/24/18 216 lb 12.8 oz (98.3 kg)   01/25/18 221 lb (100.2 kg)   01/24/18 221 lb (100.2 kg)              Today, you had the following     No orders found for display        Primary Care Provider Office Phone # Fax #    Jacob Graham [phone] [address] [facility] [address]  [facility] [address]        Equal Access to Services     TIFFANIE DOE : Hadii tin ku hadkiannao Soomaali, waaxda luqadaha, qaybta kaalmada adeegyada, chelo ramirezn maricarmen peguero mireille noland. So [facility] [phone].    ATENCIÓN: Si habla español, tiene a bledsoe disposición servicios gratuitos de asistencia lingüística. Llame al [phone].    We comply with applicable federal civil rights laws and [place] laws. We do not discriminate on the basis of race, color, national origin, age, disability, sex, sexual orientation, or gender identity.            Thank you!     Thank you for choosing [facility]  for your care. Our goal is always to provide you with excellent care. Hearing back from our patients is one way we can continue to improve our services. Please take a few minutes to complete the written survey that you may receive in the mail after your visit with us. Thank you!             Your Updated Medication List - Protect others around you: Learn how to safely use, store and throw away your medicines at www.disposemymeds.org.          This list is accurate as of 5/3/18 11:53 AM.  Always use your most recent med list.                   Brand Name Dispense Instructions for use Diagnosis    aspirin 81 MG tablet     90 tablet    Take 1 tablet (81 mg) by mouth daily    Hyperlipidemia LDL goal <130       atorvastatin 20 MG tablet    LIPITOR    90 tablet    Take 1 tablet (20 mg) by mouth daily    Hyperlipidemia LDL goal <130       guaiFENesin-codeine 100-10 MG/5ML Soln solution    ROBITUSSIN AC    120 mL    Take 5-10 mLs by mouth nightly as needed for cough    Cough       sildenafil 20 MG tablet    REVATIO    30 tablet    Take 1-2 tablets (20-40 mg) by mouth daily as needed for erectile dysfunction Take 30 min to 4 hours before intercourse. ?Never use with nitroglycerin, terazosin or  doxazosin    Erectile dysfunction, unspecified erectile dysfunction type

## 2018-05-03 NOTE — PROGRESS NOTES
Billie Aden is enrolled/participating in the retail pharmacy Blood Pressure Goals Achievement Program (BPGAP).  Billie Aden was evaluated at [facility] on May 3, 2018 at which time his blood pressure was:    BP Readings from Last 3 Encounters:   05/03/18 138/84   04/24/18 (!) 150/91   01/25/18 144/78     Reviewed lifestyle modifications for blood pressure control and reduction: including making healthy food choices, managing weight, getting regular exercise, smoking cessation, reducing alcohol consumption, monitoring blood pressure regularly.     Billie Aden is not experiencing symptoms.    Follow-Up: BP is at goal of < 150/90 mmHg (patient 60+ years of age without diabetes).  Recommended follow-up at pharmacy in 6 months.     Recommendation to Provider: bp followup within 6 month    Billie Aden was evaluated for enrollment into the PGEN study today.    Patient eligible for enrollment:  No  Patient interested in enrollment:  No    Completed by: Sherley Geller, PharmD, [facility]     [facility]  [phone]

## 2018-05-30 ENCOUNTER — OFFICE VISIT (OUTPATIENT)
Dept: FAMILY MEDICINE | Facility: CLINIC | Age: 63
End: 2018-05-30
Payer: COMMERCIAL

## 2018-05-30 VITALS
SYSTOLIC BLOOD PRESSURE: 150 MMHG | BODY MASS INDEX: 33.24 KG/M2 | DIASTOLIC BLOOD PRESSURE: 94 MMHG | WEIGHT: 219.3 LBS | TEMPERATURE: 98.1 F | HEIGHT: 68 IN | OXYGEN SATURATION: 95 % | HEART RATE: 89 BPM

## 2018-05-30 DIAGNOSIS — J98.01 BRONCHOSPASM: ICD-10-CM

## 2018-05-30 DIAGNOSIS — J06.9 UPPER RESPIRATORY TRACT INFECTION, UNSPECIFIED TYPE: Primary | ICD-10-CM

## 2018-05-30 DIAGNOSIS — R05.9 COUGH: ICD-10-CM

## 2018-05-30 PROCEDURE — 99213 OFFICE O/P EST LOW 20 MIN: CPT | Performed by: INTERNAL MEDICINE

## 2018-05-30 RX ORDER — PREDNISONE 20 MG/1
TABLET ORAL
Qty: 20 TABLET | Refills: 1 | Status: SHIPPED | OUTPATIENT
Start: 2018-05-30 | End: 2018-08-22

## 2018-05-30 RX ORDER — ALBUTEROL SULFATE 90 UG/1
2 AEROSOL, METERED RESPIRATORY (INHALATION) EVERY 6 HOURS PRN
Qty: 1 INHALER | Refills: 3 | Status: SHIPPED | OUTPATIENT
Start: 2018-05-30 | End: 2018-08-22

## 2018-05-30 RX ORDER — AZITHROMYCIN 250 MG/1
TABLET, FILM COATED ORAL
Qty: 6 TABLET | Refills: 2 | Status: SHIPPED | OUTPATIENT
Start: 2018-05-30 | End: 2018-08-22

## 2018-05-30 NOTE — MR AVS SNAPSHOT
"              After Visit Summary   5/30/2018    Billie Aden    MRN: [id number]           Patient Information     Date Of Birth          1955        Visit Information        Provider Department      5/30/2018 4:40 PM Alida Denis MD [facility]        Today's Diagnoses     Upper respiratory tract infection, unspecified type    -  1    Bronchospasm        Cough           Follow-ups after your visit        Who to contact     If you have questions or need follow up information about today's clinic visit or your schedule please contact [facility] directly at [phone].  Normal or non-critical lab and imaging results will be communicated to you by [employer], letter or phone within 4 business days after the clinic has received the results. If you do not hear from us within 7 days, please contact the clinic through [employer] or phone. If you have a critical or abnormal lab result, we will notify you by phone as soon as possible.  Submit refill requests through [employer] or call your pharmacy and they will forward the refill request to us. Please allow 3 business days for your refill to be completed.          Additional Information About Your Visit        MyChart Information     [employer] gives you secure access to your electronic health record. If you see a primary care provider, you can also send messages to your care team and make appointments. If you have questions, please call your primary care clinic.  If you do not have a primary care provider, please call [phone] and they will assist you.        Care EveryWhere ID     This is your Care EveryWhere ID. This could be used by other organizations to access your [facility] medical records  [id number]        Your Vitals Were     Pulse Temperature Height Pulse Oximetry BMI (Body Mass Index)       89 98.1  F (36.7  C) (Oral) 5' 8\" (1.727 m) 95% 33.34 kg/m2        Blood Pressure from Last 3 Encounters:   05/30/18 (!) 150/94   05/03/18 " 138/84   04/24/18 (!) 150/91    Weight from Last 3 Encounters:   05/30/18 219 lb 4.8 oz (99.5 kg)   04/24/18 216 lb 12.8 oz (98.3 kg)   01/25/18 221 lb (100.2 kg)              Today, you had the following     No orders found for display         Today's Medication Changes          These changes are accurate as of 5/30/18  5:28 PM.  If you have any questions, ask your nurse or doctor.               Start taking these medicines.        Dose/Directions    albuterol 108 (90 Base) MCG/ACT Inhaler   Commonly known as:  PROAIR HFA/PROVENTIL HFA/VENTOLIN HFA   Used for:  Cough, Bronchospasm, Upper respiratory tract infection, unspecified type        Dose:  2 puff   Inhale 2 puffs into the lungs every 6 hours as needed for shortness of breath / dyspnea or wheezing   Quantity:  1 Inhaler   Refills:  3       azithromycin 250 MG tablet   Commonly known as:  ZITHROMAX   Used for:  Upper respiratory tract infection, unspecified type, Bronchospasm, Cough        Two tablets first day, then one tablet daily for four days.   Quantity:  6 tablet   Refills:  2       predniSONE 20 MG tablet   Commonly known as:  DELTASONE   Used for:  Upper respiratory tract infection, unspecified type, Bronchospasm, Cough        Take 3 tabs (60 mg) by mouth daily x 3 days, 2 tabs (40 mg) daily x 3 days, 1 tab (20 mg) daily x 3 days, then 1/2 tab (10 mg) x 3 days.   Quantity:  20 tablet   Refills:  1            Where to get your medicines      Some of these will need a paper prescription and others can be bought over the counter.  Ask your nurse if you have questions.     Bring a paper prescription for each of these medications     albuterol 108 (90 Base) MCG/ACT Inhaler    azithromycin 250 MG tablet    predniSONE 20 MG tablet                Primary Care Provider Office Phone # Fax #    Jacob Graham [phone] [address]  [place] [address]        Equal Access to Services     TIFFANIE DOE AH: Gab austin  jose f Romo, waangelicada luqadaha, qaybta kaalmada bladimir, chelo monetcarrillo luidn. So [facility] [phone].    ATENCIÓN: Si habla altagracia, tiene a bledsoe disposición servicios gratuitos de asistencia lingüística. Yanci al [phone].    We comply with applicable federal civil rights laws and [place] laws. We do not discriminate on the basis of race, color, national origin, age, disability, sex, sexual orientation, or gender identity.            Thank you!     Thank you for choosing [facility]  for your care. Our goal is always to provide you with excellent care. Hearing back from our patients is one way we can continue to improve our services. Please take a few minutes to complete the written survey that you may receive in the mail after your visit with us. Thank you!             Your Updated Medication List - Protect others around you: Learn how to safely use, store and throw away your medicines at www.disposemymeds.org.          This list is accurate as of 5/30/18  5:28 PM.  Always use your most recent med list.                   Brand Name Dispense Instructions for use Diagnosis    albuterol 108 (90 Base) MCG/ACT Inhaler    PROAIR HFA/PROVENTIL HFA/VENTOLIN HFA    1 Inhaler    Inhale 2 puffs into the lungs every 6 hours as needed for shortness of breath / dyspnea or wheezing    Cough, Bronchospasm, Upper respiratory tract infection, unspecified type       aspirin 81 MG tablet     90 tablet    Take 1 tablet (81 mg) by mouth daily    Hyperlipidemia LDL goal <130       atorvastatin 20 MG tablet    LIPITOR    90 tablet    Take 1 tablet (20 mg) by mouth daily    Hyperlipidemia LDL goal <130       azithromycin 250 MG tablet    ZITHROMAX    6 tablet    Two tablets first day, then one tablet daily for four days.    Upper respiratory tract infection, unspecified type, Bronchospasm, Cough       guaiFENesin-codeine 100-10 MG/5ML Soln solution    ROBITUSSIN AC    120 mL    Take 5-10 mLs by  mouth nightly as needed for cough    Cough       predniSONE 20 MG tablet    DELTASONE    20 tablet    Take 3 tabs (60 mg) by mouth daily x 3 days, 2 tabs (40 mg) daily x 3 days, 1 tab (20 mg) daily x 3 days, then 1/2 tab (10 mg) x 3 days.    Upper respiratory tract infection, unspecified type, Bronchospasm, Cough       sildenafil 20 MG tablet    REVATIO    30 tablet    Take 1-2 tablets (20-40 mg) by mouth daily as needed for erectile dysfunction Take 30 min to 4 hours before intercourse. ?Never use with nitroglycerin, terazosin or doxazosin    Erectile dysfunction, unspecified erectile dysfunction type

## 2018-07-25 ENCOUNTER — ALLIED HEALTH/NURSE VISIT (OUTPATIENT)
Dept: FAMILY MEDICINE | Facility: CLINIC | Age: 63
End: 2018-07-25
Payer: COMMERCIAL

## 2018-07-25 VITALS — DIASTOLIC BLOOD PRESSURE: 92 MMHG | SYSTOLIC BLOOD PRESSURE: 158 MMHG

## 2018-07-25 DIAGNOSIS — Z01.30 BP CHECK: Primary | ICD-10-CM

## 2018-07-25 PROCEDURE — 99207 ZZC NO CHARGE NURSE ONLY: CPT | Performed by: INTERNAL MEDICINE

## 2018-07-25 NOTE — PROGRESS NOTES
Billie Aden is enrolled/participating in the retail pharmacy Blood Pressure Goals Achievement Program (BPGAP).  Billie Aden was evaluated at [facility] on July 25, 2018 at which time his blood pressure was:    BP Readings from Last 3 Encounters:   07/25/18 (!) 158/92   05/30/18 (!) 150/94   05/03/18 138/84     Reviewed lifestyle modifications for blood pressure control and reduction: including making healthy food choices, managing weight, getting regular exercise, smoking cessation, reducing alcohol consumption, monitoring blood pressure regularly.     Billie Aden is not experiencing symptoms.    Follow-Up: BP is not at goal of 150/90. Recommended follow-up with PCP.  Routing to PCP for further review.    Recommendation to Provider: consider starting medication or check blood pressure a couple of more times over the next month to see if he is consistently running high.    Billie Aden was evaluated for enrollment into the PGEN study today.    Patient eligible for enrollment:  Unknown  Patient interested in enrollment:  Unknown    Completed by: Nata Olmos, Dash  [facility]  [phone]

## 2018-08-21 ENCOUNTER — TELEPHONE (OUTPATIENT)
Dept: FAMILY MEDICINE | Facility: CLINIC | Age: 63
End: 2018-08-21

## 2018-08-21 ENCOUNTER — ALLIED HEALTH/NURSE VISIT (OUTPATIENT)
Dept: FAMILY MEDICINE | Facility: CLINIC | Age: 63
End: 2018-08-21
Payer: COMMERCIAL

## 2018-08-21 VITALS — SYSTOLIC BLOOD PRESSURE: 156 MMHG | DIASTOLIC BLOOD PRESSURE: 85 MMHG

## 2018-08-21 DIAGNOSIS — Z01.30 BP CHECK: Primary | ICD-10-CM

## 2018-08-21 PROCEDURE — 99207 ZZC NO CHARGE NURSE ONLY: CPT | Performed by: INTERNAL MEDICINE

## 2018-08-21 NOTE — PROGRESS NOTES
Billie Aden is enrolled/participating in the retail pharmacy Blood Pressure Goals Achievement Program (BPGAP).  Billie Aden was evaluated at [facility] on August 21, 2018 at which time his blood pressure was:    BP Readings from Last 3 Encounters:   08/21/18 156/85   07/25/18 (!) 158/92   05/30/18 (!) 150/94     Reviewed lifestyle modifications for blood pressure control and reduction: including making healthy food choices, managing weight, getting regular exercise, smoking cessation, reducing alcohol consumption, monitoring blood pressure regularly.     Billie Aden is not experiencing symptoms.    Follow-Up: BP is not at goal of < 150/90 mmHg (patient 60+ years of age without diabetes), Recommended follow-up in 1 month at the pharmacy. Routing to PCP as an FYI.    Recommendation to Provider: none. Pt feeling fine, says BP has been fluctuating.    Billie Aden was evaluated for enrollment into the PGEN study today.    Patient eligible for enrollment:  No  Patient interested in enrollment:  No    Completed by: Monica Brown, R.Ph.    [facility]) [address] [place] [id number]    Phone: [phone]    Fax: [phone]

## 2018-08-21 NOTE — Clinical Note
Routing message to PCP for review -BP checked at pharmacy and noted to be above goal. Recommended patient follow-up with either pharmacy within 30 days or PCP.

## 2018-08-22 ENCOUNTER — OFFICE VISIT (OUTPATIENT)
Dept: FAMILY MEDICINE | Facility: CLINIC | Age: 63
End: 2018-08-22
Payer: COMMERCIAL

## 2018-08-22 VITALS
OXYGEN SATURATION: 98 % | WEIGHT: 217 LBS | SYSTOLIC BLOOD PRESSURE: 144 MMHG | TEMPERATURE: 97.7 F | BODY MASS INDEX: 32.89 KG/M2 | HEART RATE: 84 BPM | DIASTOLIC BLOOD PRESSURE: 94 MMHG | HEIGHT: 68 IN

## 2018-08-22 DIAGNOSIS — I25.10 CORONARY ARTERY CALCIFICATION: Primary | ICD-10-CM

## 2018-08-22 DIAGNOSIS — E78.5 HYPERLIPIDEMIA LDL GOAL <130: ICD-10-CM

## 2018-08-22 DIAGNOSIS — I10 ESSENTIAL HYPERTENSION, BENIGN: ICD-10-CM

## 2018-08-22 LAB
ALBUMIN UR-MCNC: NEGATIVE MG/DL
APPEARANCE UR: CLEAR
BILIRUB UR QL STRIP: NEGATIVE
COLOR UR AUTO: YELLOW
ERYTHROCYTE [DISTWIDTH] IN BLOOD BY AUTOMATED COUNT: 12.6 % (ref 10–15)
GLUCOSE UR STRIP-MCNC: NEGATIVE MG/DL
HCT VFR BLD AUTO: 44.9 % (ref 40–53)
HGB BLD-MCNC: 15.6 G/DL (ref 13.3–17.7)
HGB UR QL STRIP: NEGATIVE
KETONES UR STRIP-MCNC: NEGATIVE MG/DL
LEUKOCYTE ESTERASE UR QL STRIP: NEGATIVE
MCH RBC QN AUTO: 33.1 PG (ref 26.5–33)
MCHC RBC AUTO-ENTMCNC: 34.7 G/DL (ref 31.5–36.5)
MCV RBC AUTO: 95 FL (ref 78–100)
NITRATE UR QL: NEGATIVE
PH UR STRIP: 7 PH (ref 5–7)
PLATELET # BLD AUTO: 168 10E9/L (ref 150–450)
RBC # BLD AUTO: 4.72 10E12/L (ref 4.4–5.9)
SOURCE: NORMAL
SP GR UR STRIP: 1.02 (ref 1–1.03)
UROBILINOGEN UR STRIP-ACNC: 1 EU/DL (ref 0.2–1)
WBC # BLD AUTO: 9 10E9/L (ref 4–11)

## 2018-08-22 PROCEDURE — 85027 COMPLETE CBC AUTOMATED: CPT | Performed by: INTERNAL MEDICINE

## 2018-08-22 PROCEDURE — 93000 ELECTROCARDIOGRAM COMPLETE: CPT | Performed by: INTERNAL MEDICINE

## 2018-08-22 PROCEDURE — 84443 ASSAY THYROID STIM HORMONE: CPT | Performed by: INTERNAL MEDICINE

## 2018-08-22 PROCEDURE — 80048 BASIC METABOLIC PNL TOTAL CA: CPT | Performed by: INTERNAL MEDICINE

## 2018-08-22 PROCEDURE — 99214 OFFICE O/P EST MOD 30 MIN: CPT | Performed by: INTERNAL MEDICINE

## 2018-08-22 PROCEDURE — 36415 COLL VENOUS BLD VENIPUNCTURE: CPT | Performed by: INTERNAL MEDICINE

## 2018-08-22 PROCEDURE — 81003 URINALYSIS AUTO W/O SCOPE: CPT | Performed by: INTERNAL MEDICINE

## 2018-08-22 RX ORDER — AMLODIPINE BESYLATE 5 MG/1
5 TABLET ORAL DAILY
Qty: 90 TABLET | Refills: 3 | Status: SHIPPED | OUTPATIENT
Start: 2018-08-22 | End: 2019-08-30

## 2018-08-22 NOTE — PATIENT INSTRUCTIONS
(I25.10,  I25.84) Coronary artery calcification  (primary encounter diagnosis)  Comment: I would recommend a consultation in cardiology to ensure that you are optimally managing your risks for coronary artery disease   Plan: CARDIOLOGY EVAL ADULT REFERRAL            (I10) Essential hypertension, benign  Comment: blood pressure is elevated today and we will plan to check labs today and start new blood pressure medication of amlodipine 5 mg daliy and follow up in 1 month for recheck and routine physical   Plan: amLODIPine (NORVASC) 5 MG tablet, CARDIOLOGY         EVAL ADULT REFERRAL, EKG 12-lead complete         w/read - Clinics, CBC with platelets, Basic         metabolic panel, TSH with free T4 reflex            (E78.5) Hyperlipidemia LDL goal <130  Comment: check fasting lipid panel   Plan:     Shingrix vaccine is now available.  I would call your insurance to see if a shingles vaccine is covered and get this at your pharmacy

## 2018-08-22 NOTE — TELEPHONE ENCOUNTER
Can we call Billie Aden and let him know that we received message from pharmacy and I would recommend a follow up in the clinic for blood pressure recheck    Jacob Graham MD

## 2018-08-22 NOTE — MR AVS SNAPSHOT
After Visit Summary   8/22/2018    Billie Aden    MRN: [id number]           Patient Information     Date Of Birth          1955        Visit Information        Provider Department      8/22/2018 12:00 PM Jacob Graham MD [facility]        Today's Diagnoses     Coronary artery calcification    -  1    Essential hypertension, benign        Hyperlipidemia LDL goal <130          Care Instructions    (I25.10,  I25.84) Coronary artery calcification  (primary encounter diagnosis)  Comment: I would recommend a consultation in cardiology to ensure that you are optimally managing your risks for coronary artery disease   Plan: CARDIOLOGY EVAL ADULT REFERRAL            (I10) Essential hypertension, benign  Comment: blood pressure is elevated today and we will plan to check labs today and start new blood pressure medication of amlodipine 5 mg daliy and follow up in 1 month for recheck and routine physical   Plan: amLODIPine (NORVASC) 5 MG tablet, CARDIOLOGY         EVAL ADULT REFERRAL, EKG 12-lead complete         w/read - Clinics, CBC with platelets, Basic         metabolic panel, TSH with free T4 reflex            (E78.5) Hyperlipidemia LDL goal <130  Comment: check fasting lipid panel   Plan:     Shingrix vaccine is now available.  I would call your insurance to see if a shingles vaccine is covered and get this at your pharmacy               Follow-ups after your visit        Additional Services     CARDIOLOGY EVAL ADULT REFERRAL       Preferred location:  [facility] [phone]   [email or web address]    Please be aware that coverage of these services is subject to the terms and limitations of your health insurance plan.  Call member services at your health plan with any benefit or coverage questions.      Please bring the following to your appointment:  Any x-rays, CTs or MRIs which have been performed. Contact  "the facility where they were done to arrange for  prior to your scheduled appointment.    List of current medications  This referral request   Any documents/labs given to you for this referral                  Who to contact     If you have questions or need follow up information about today's clinic visit or your schedule please contact [facility] directly at [phone].  Normal or non-critical lab and imaging results will be communicated to you by MyChart, letter or phone within 4 business days after the clinic has received the results. If you do not hear from us within 7 days, please contact the clinic through [employer] or phone. If you have a critical or abnormal lab result, we will notify you by phone as soon as possible.  Submit refill requests through [employer] or call your pharmacy and they will forward the refill request to us. Please allow 3 business days for your refill to be completed.          Additional Information About Your Visit        [employer] Information     [employer] gives you secure access to your electronic health record. If you see a primary care provider, you can also send messages to your care team and make appointments. If you have questions, please call your primary care clinic.  If you do not have a primary care provider, please call [phone] and they will assist you.        Care EveryWhere ID     This is your Care EveryWhere ID. This could be used by other organizations to access your [facility] medical records  [id number]        Your Vitals Were     Pulse Temperature Height Pulse Oximetry BMI (Body Mass Index)       84 97.7  F (36.5  C) (Oral) 5' 8\" (1.727 m) 98% 32.99 kg/m2        Blood Pressure from Last 3 Encounters:   08/22/18 (!) 144/94   08/21/18 156/85   07/25/18 (!) 158/92    Weight from Last 3 Encounters:   08/22/18 217 lb (98.4 kg)   05/30/18 219 lb 4.8 oz (99.5 kg)   04/24/18 216 lb 12.8 oz (98.3 kg)              We Performed the Following     Basic " metabolic panel     CARDIOLOGY EVAL ADULT REFERRAL     CBC with platelets     EKG 12-lead complete w/read - Clinics     TSH with free T4 reflex     UA reflex to Microscopic and Culture          Today's Medication Changes          These changes are accurate as of 8/22/18 12:34 PM.  If you have any questions, ask your nurse or doctor.               Start taking these medicines.        Dose/Directions    amLODIPine 5 MG tablet   Commonly known as:  NORVASC   Used for:  Essential hypertension, benign   Started by:  Jacob Graham MD        Dose:  5 mg   Take 1 tablet (5 mg) by mouth daily   Quantity:  90 tablet   Refills:  3         Stop taking these medicines if you haven't already. Please contact your care team if you have questions.     albuterol 108 (90 Base) MCG/ACT inhaler   Commonly known as:  PROAIR HFA/PROVENTIL HFA/VENTOLIN HFA   Stopped by:  Jacob Graham MD           azithromycin 250 MG tablet   Commonly known as:  ZITHROMAX   Stopped by:  Jacob Graham MD           guaiFENesin-codeine 100-10 MG/5ML Soln solution   Commonly known as:  ROBITUSSIN AC   Stopped by:  Jacob Graham MD           predniSONE 20 MG tablet   Commonly known as:  DELTASONE   Stopped by:  Jacob Graham MD                Where to get your medicines      These medications were sent to [facility], [place] - [address]  [address], [address], [facility] [address]     Phone:  [phone]     amLODIPine 5 MG tablet                Primary Care Provider Office Phone # Fax #    Jacob Graham [phone] [address]  [place] [address]        Equal Access to Services     JOSEPHINE [facility] : Hadii tin Romo, waaxda luqadaha, qaybta kaalmada bladimir, chelo noland. So [facility] [phone].    ATENCIÓN: Si habla español, tiene a bledsoe disposición servicios gratuitos de  asistencia lingüística. Yanci al [phone].    We comply with applicable federal civil rights laws and [place] laws. We do not discriminate on the basis of race, color, national origin, age, disability, sex, sexual orientation, or gender identity.            Thank you!     Thank you for choosing [facility]  for your care. Our goal is always to provide you with excellent care. Hearing back from our patients is one way we can continue to improve our services. Please take a few minutes to complete the written survey that you may receive in the mail after your visit with us. Thank you!             Your Updated Medication List - Protect others around you: Learn how to safely use, store and throw away your medicines at www.disposemymeds.org.          This list is accurate as of 8/22/18 12:34 PM.  Always use your most recent med list.                   Brand Name Dispense Instructions for use Diagnosis    amLODIPine 5 MG tablet    NORVASC    90 tablet    Take 1 tablet (5 mg) by mouth daily    Essential hypertension, benign       aspirin 81 MG tablet     90 tablet    Take 1 tablet (81 mg) by mouth daily    Hyperlipidemia LDL goal <130       atorvastatin 20 MG tablet    LIPITOR    90 tablet    Take 1 tablet (20 mg) by mouth daily    Hyperlipidemia LDL goal <130       sildenafil 20 MG tablet    REVATIO    30 tablet    Take 1-2 tablets (20-40 mg) by mouth daily as needed for erectile dysfunction Take 30 min to 4 hours before intercourse. ?Never use with nitroglycerin, terazosin or doxazosin    Erectile dysfunction, unspecified erectile dysfunction type

## 2018-08-22 NOTE — PROGRESS NOTES
"[facility]  CLINIC PROGRESS NOTE    Subjective:  Hypertension   Billie Aden returns for follow-up of his high blood pressure.  His last couple of readings in May and in July have been elevated with systolics in the 150s.  Notably he was treated with a course of steroids in late May, but this is now completed.  He is not currently taking any blood pressure medications.  He does have a history of coronary artery disease seen on CT Calcium score in 2017.      Past medical history, medications, allergies, social history, family history reviewed and updated in [facility] as of 8/22/2018 .    ROS  CONSTITUTIONAL: no fatigue, no unexpected change in weight  SKIN: no worrisome rashes, no worrisome moles, no worrisome lesions  EYES: no acute vision problems or changes  ENT: no ear problems, no mouth problems, no throat problems  RESP: no significant cough, no shortness of breath  CV: no chest pain, no palpitations, no new or worsening peripheral edema  GI: no nausea, no vomiting, no constipation, no diarrhea  : no frequency, no dysuria, no hematuria  MS: no claudication, no myalgias, no joint aches  PSYCHIATRIC: no changes in mood or affect      Objective:  Vitals  BP (!) 144/94 (BP Location: Left arm, Patient Position: Sitting, Cuff Size: Adult Large)  Pulse 84  Temp 97.7  F (36.5  C) (Oral)  Ht 5' 8\" (1.727 m)  Wt 217 lb (98.4 kg)  SpO2 98%  BMI 32.99 kg/m2  GEN: Alert Oriented x3 NAD  HEENT: Atraumatic, normocephalic, neck supple, no thyromegaly, negative cervical adenopathy  TM: TM bilaterally pearly and grey with normal light reflex  CV: RRR no murmurs or rubs  PULM: CTA no wheezes or crackles  ABD: Soft, nontender nondistended, no hepatosplenomegally  SKIN: No visible skin lesion or ulcerations  EXT: No edema bilateral lower extremities  NEURO: Gait and station deferred, No focal neurologic deficits  PSYCH: Mood good, affect mood congruent    No images are attached to the encounter.    No results found " for this or any previous visit (from the past 24 hour(s)).    Assessment/Plan:  Patient Instructions   (I25.10,  I25.84) Coronary artery calcification  (primary encounter diagnosis)  Comment: I would recommend a consultation in cardiology to ensure that you are optimally managing your risks for coronary artery disease   Plan: CARDIOLOGY EVAL ADULT REFERRAL            (I10) Essential hypertension, benign  Comment: blood pressure is elevated today and we will plan to check labs today and start new blood pressure medication of amlodipine 5 mg daliy and follow up in 1 month for recheck and routine physical   Plan: amLODIPine (NORVASC) 5 MG tablet, CARDIOLOGY         EVAL ADULT REFERRAL, EKG 12-lead complete         w/read - Clinics, CBC with platelets, Basic         metabolic panel, TSH with free T4 reflex            (E78.5) Hyperlipidemia LDL goal <130  Comment: check fasting lipid panel   Plan:     Shingrix vaccine is now available.  I would call your insurance to see if a shingles vaccine is covered and get this at your pharmacy         Follow up in 1 month    25 minutes spent with patient.  Over 50% of time counseling      Disclaimer: This note consists of symbols derived from keyboarding, dictation and/or voice recognition software. As a result, there may be errors in the script that have gone undetected. Please consider this when interpreting information found in this chart.    Jacob Graham MD  [phone]

## 2018-08-23 LAB
ANION GAP SERPL CALCULATED.3IONS-SCNC: 8 MMOL/L (ref 3–14)
BUN SERPL-MCNC: 14 MG/DL (ref 7–30)
CALCIUM SERPL-MCNC: 8.9 MG/DL (ref 8.5–10.1)
CHLORIDE SERPL-SCNC: 104 MMOL/L (ref 94–109)
CO2 SERPL-SCNC: 28 MMOL/L (ref 20–32)
CREAT SERPL-MCNC: 1.02 MG/DL (ref 0.66–1.25)
GFR SERPL CREATININE-BSD FRML MDRD: 74 ML/MIN/1.7M2
GLUCOSE SERPL-MCNC: 89 MG/DL (ref 70–99)
POTASSIUM SERPL-SCNC: 4.4 MMOL/L (ref 3.4–5.3)
SODIUM SERPL-SCNC: 140 MMOL/L (ref 133–144)
TSH SERPL DL<=0.005 MIU/L-ACNC: 1.96 MU/L (ref 0.4–4)

## 2018-08-24 NOTE — PROGRESS NOTES
Martinez Wise,    I have had the opportunity to review your recent results and an interpretation is as follows:  Your labs all returned within normal limits.  We can start the blood pressure medication as planned and follow-up for routine physical    Sincerely,  Jacob Graham MD

## 2018-09-20 ENCOUNTER — OFFICE VISIT (OUTPATIENT)
Dept: CARDIOLOGY | Facility: CLINIC | Age: 63
End: 2018-09-20
Attending: INTERNAL MEDICINE
Payer: COMMERCIAL

## 2018-09-20 VITALS
HEIGHT: 68 IN | WEIGHT: 215 LBS | HEART RATE: 84 BPM | SYSTOLIC BLOOD PRESSURE: 122 MMHG | DIASTOLIC BLOOD PRESSURE: 80 MMHG | OXYGEN SATURATION: 97 % | BODY MASS INDEX: 32.58 KG/M2

## 2018-09-20 DIAGNOSIS — Z82.49 FAMILY HISTORY OF ISCHEMIC HEART DISEASE: ICD-10-CM

## 2018-09-20 DIAGNOSIS — I10 BENIGN ESSENTIAL HYPERTENSION: ICD-10-CM

## 2018-09-20 DIAGNOSIS — Z71.89 CARDIAC RISK COUNSELING: Primary | ICD-10-CM

## 2018-09-20 DIAGNOSIS — I25.10 CORONARY ARTERY CALCIFICATION: ICD-10-CM

## 2018-09-20 PROCEDURE — 99204 OFFICE O/P NEW MOD 45 MIN: CPT | Performed by: INTERNAL MEDICINE

## 2018-09-20 RX ORDER — ATORVASTATIN CALCIUM 20 MG/1
40 TABLET, FILM COATED ORAL DAILY
Qty: 90 TABLET | Refills: 3 | COMMUNITY
Start: 2018-09-20 | End: 2018-09-24

## 2018-09-20 NOTE — LETTER
9/20/2018    Jacob Graham MD, MD  [address]  [facility] [address]    RE: Billie Aden       Dear Colleague,    I had the pleasure of seeing Billie Aden in the [facility].    Clinic visit note dictated. Dictation reference number - [id number]        REVIEW OF SYSTEMS:  A comprehensive 10-point review of systems was completed and the pertinent positives are documented in the history of present illness.      ALLERGIES:  Allergies   Allergen Reactions     Dilaudid [Hydromorphone]      Agitation, did not help with pain     Morphine Anxiety       PAST MEDICAL HISTORY:    Past Medical History:   Diagnosis Date     BCC (basal cell carcinoma of skin) 11/2017    left lower lid     Cholelithiasis 10/8/2013     Hypertension 08/2018     Pelvic fracture (H) 11/2014    occured while horseback riding     Renal disease     kidney stone       PAST SURGICAL HISTORY:    Past Surgical History:   Procedure Laterality Date     CHOLECYSTECTOMY       HC CLOSED TX SHOULDER DISLOC W MANIP NO ANESTH       LAPAROSCOPIC CHOLECYSTECTOMY N/A 12/23/2014    Procedure: LAPAROSCOPIC CHOLECYSTECTOMY;  Surgeon: Avelino Bryson MD;  Location:  SD     REPAIR MOHS Left 1/25/2018    Procedure: REPAIR MOHS;  Left Lower Eyelid Mohs Reconstruction;  Surgeon: Claudia Melvin MD;  Location:  OR       FAMILY HISTORY:    Family History   Problem Relation Age of Onset     Cancer - colorectal Mother 79     Cardiovascular Father 70     Had a carotid endarterectomy at age 70 years.  Tobacco user.     Heart Surgery Brother 66     Alive. Had CABG at age 66 years. Smoker     Type 2 Diabetes Brother        SOCIAL HISTORY:    Social History     Social History     Marital status:      Spouse name: N/A     Number of children: 2     Years of education: N/A     Occupational History     remodels homes      Social History Main Topics     Smoking status: Never Smoker     Smokeless tobacco: Never Used     Alcohol  "use 0.0 oz/week     0 Standard drinks or equivalent per week      Comment: occasionally     Drug use: No     Sexual activity: Not Currently     Other Topics Concern     None     Social History Narrative    , 2 children    Self Employed - semi-retired       PHYSICAL EXAM:    Vitals: /80  Pulse 84  Ht 1.727 m (5' 8\")  Wt 97.5 kg (215 lb)  SpO2 97%  BMI 32.69 kg/m2  Wt Readings from Last 5 Encounters:   09/20/18 97.5 kg (215 lb)   08/22/18 98.4 kg (217 lb)   05/30/18 99.5 kg (219 lb 4.8 oz)   04/24/18 98.3 kg (216 lb 12.8 oz)   01/25/18 100.2 kg (221 lb)       Constitutional: Comfortable at rest. Cooperative, alert and oriented, well developed, well nourished.  Eyes: Pupils equal and round, conjunctivae and lids unremarkable, sclera white, no xanthalasma,   ENT: Satisfactory dentition.  No cyanosis or pallor.  Neck: Carotid pulses are full and equal bilaterally, no carotid bruit, no thyromegaly     Respiratory: Normal respiratory effort with symmetrical chest wall movements and no use of accessory muscles. Good air entry with normal breath sounds and no rales or wheeze.  Cardiovascular: Normal jugular venous pulse and pressure.  Normal carotid pulse character and volume.  No carotid bruit.  Apical impulse is undisplaced and normal to palpation without parasternal heave or thrill.  Heart sounds are normal and regular. No audible murmur. No S3, S4 or friction rub.    GI: Soft, nontender, no hepatosplenomegaly, no masses, active bowel sounds.    Skin: No rash, erythema, ecchymosis.  Musculoskeletal: Normal muscle tone and strength. Normal gait. No spinal deformities.  Neuropsychiatric: Oriented to time place and person.  Affect normal.  No gross motor deficits.  Extremities: No edema. No clubbing or deformities.        Encounter Diagnoses   Name Primary?     Coronary artery calcification Yes     Benign essential hypertension      Family history of ischemic heart disease        No orders of the defined " types were placed in this encounter.      CC  REFERRING PROVIDER:  Jacob Graham MD  [address]  [place] [address]                  Thank you for allowing me to participate in the care of your patient.      Sincerely,     Akbar Avilez MD     [facility]    cc:   Jacob Graham MD  [address]  [place] [address]

## 2018-09-20 NOTE — LETTER
9/20/2018      Jacob Graham MD, MD  [address] [facility] [address]  [facility] [address]      RE: Billie Aden       Dear Colleague,    I had the pleasure of seeing Billie Aden in the [facility].    Service Date: 09/20/2018      PRIMARY CARE AND REFERRING PROVIDER:  Jacob Graham MD      REASON FOR VISIT:   1.  Cardiovascular risk stratification in light of elevated coronary calcium score.   2.  New diagnosis of hypertension.      HISTORY OF PRESENT ILLNESS:    Mr. Aden is new to Cardiology.  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  He remarkably looks 15-20 years younger than his stated age.  Lifelong never tobacco user.  BMI 32.7 kg/m2.      Mr. Aden has a family history of ischemic heart disease.  His father had a carotid endarterectomy at age 70 years (he was a smoker.)  One of his brothers, Abraham, had a CABG at age 66 years, currently alive at age 70 years.  (He was also a smoker.)  Another brother has type 2 diabetes mellitus.  Mr. Aden himself has no symptoms of coronary artery disease, but in view of his family history, he underwent a CT coronary calcium study in 08/2015, which showed mild coronary calcification in the LAD, circumflex and right coronary artery with a total Agatston calcium score of 254, placing him in the 82nd percentile (LAD 30, circumflex 29, ).  The patient is very physically active.  In fact, only this week he did a lot of sheetrock, lifting 60 pound sheets without any trouble.  He denies chest pain, dyspnea or exertional fatigue.      The patient is on 20 mg of atorvastatin with a total cholesterol of 128, HDL 52, LDL 50, triglycerides 130. More lately, his blood pressure has been elevated in the 144-158/90s range.  Dr. Graham appropriately started him on amlodipine 5 mg daily.  He is tolerating this well.  BP today is 122/80 mmHg. Lifelong never  tobacco user.  Not diabetic.      ECG:  Sinus rhythm of 74 BPM with normal cardiac intervals.      CT coronary calcium study:  2016, as above        Exercise echocardiogram:  2017, normal without evidence of stress-induced ischemia.  The patient exercised for 10 METs.      Other labs:  Hemoglobin 15.6, TSH 1.96, creatinine 1.0, hemoglobin A1c 5.5%.      CURRENT MEDICATIONS:   1.  Amlodipine 5 mg daily.   2.  Aspirin 81 mg daily.   3.  Atorvastatin 20 mg daily.      PHYSICAL EXAMINATION:   VITAL SIGNS:  /80, pulse 84 per minute, height 1.7 m (5 feet 8 inches), weight 97.5 kg (215 pounds), sats 97% on room air, BMI 32.7 kg/m2. The patient's physical exam is unremarkable.   GENERAL:  He appears significantly younger than his stated age.  Has good muscle mass.  Alert, oriented x3.   CARDIOVASCULAR:  Normal JVP.  No vascular bruit.  Normal carotid pulse.  Normal apical impulse.  Regular heart sounds.  No murmurs.   RESPIRATORY:  Clear to auscultation.   ABDOMEN:  Soft, nontender.   VASCULAR:  Bilateral distal leg and all pulses well palpable.  No edema.      DIAGNOSES:   1.  Cardiovascular risk counseling.   2.  Coronary artery calcification.   3.  Recent diagnosis of benign essential hypertension.   4.  Family history of ischemic heart disease.      The patient is asymptomatic.  In view of his family history, recent diagnosis of hypertension and  elevated coronary artery calcium score, aggressive primary prevention is indicated.      ASSESSMENT/PLAN:     1.  Agree with aspirin 81 mg daily.     2.  Agree with amlodipine 5 mg daily.  Goal BP less than 140/80 mmHg.   3.  Even though his LDL is 50, it would be reasonable to increase his atorvastatin from 20 to 40 mg daily.  If he develops any statin-related myalgias, cut back to 20 mg daily.   4.  No indication for surveillance CT coronary calcium study or stress testing when the patient is asymptomatic and his risk factors are well controlled.   5.  Follow up with  Cardiology as needed.      It was my pleasure to visit with this gentleman.  I wish him all the best.      cc:   [place] [address]         VANCE AVILEZ MD             D: 2018   T: 2018   MT: stuart      Name:     RICHARD FERNANDES   MRN:      [id number]        Account:      [id number]   :      1955           Service Date: 2018      Document: [id number]           Outpatient Encounter Prescriptions as of 2018   Medication Sig Dispense Refill     amLODIPine (NORVASC) 5 MG tablet Take 1 tablet (5 mg) by mouth daily 90 tablet 3     aspirin 81 MG tablet Take 1 tablet (81 mg) by mouth daily 90 tablet 3     [DISCONTINUED] atorvastatin (LIPITOR) 20 MG tablet Take 2 tablets (40 mg) by mouth daily 90 tablet 3     [DISCONTINUED] atorvastatin (LIPITOR) 20 MG tablet Take 1 tablet (20 mg) by mouth daily 90 tablet 3     [DISCONTINUED] sildenafil (REVATIO/VIAGRA) 20 MG tablet Take 1-2 tablets (20-40 mg) by mouth daily as needed for erectile dysfunction Take 30 min to 4 hours before intercourse.  Never use with nitroglycerin, terazosin or doxazosin 30 tablet 3     No facility-administered encounter medications on file as of 2018.        Again, thank you for allowing me to participate in the care of your patient.      Sincerely,    Vance Avilez MD     [facility]

## 2018-09-20 NOTE — PROGRESS NOTES
Service Date: 09/20/2018      PRIMARY CARE AND REFERRING PROVIDER:  Jacob Graham MD      REASON FOR VISIT:   1.  Cardiovascular risk stratification in light of elevated coronary calcium score.   2.  New diagnosis of hypertension.      HISTORY OF PRESENT ILLNESS:    Mr. Aden is new to Cardiology.  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  He remarkably looks 15-20 years younger than his stated age.  Lifelong never tobacco user.  BMI 32.7 kg/m2.      Mr. Aden has a family history of ischemic heart disease.  His father had a carotid endarterectomy at age 70 years (he was a smoker.)  One of his brothers, Abraham, had a CABG at age 66 years, currently alive at age 70 years.  (He was also a smoker.)  Another brother has type 2 diabetes mellitus.  Mr. Aden himself has no symptoms of coronary artery disease, but in view of his family history, he underwent a CT coronary calcium study in 08/2015, which showed mild coronary calcification in the LAD, circumflex and right coronary artery with a total Agatston calcium score of 254, placing him in the 82nd percentile (LAD 30, circumflex 29, ).  The patient is very physically active.  In fact, only this week he did a lot of sheetrock, lifting 60 pound sheets without any trouble.  He denies chest pain, dyspnea or exertional fatigue.      The patient is on 20 mg of atorvastatin with a total cholesterol of 128, HDL 52, LDL 50, triglycerides 130. More lately, his blood pressure has been elevated in the 144-158/90s range.  Dr. Graham appropriately started him on amlodipine 5 mg daily.  He is tolerating this well.  BP today is 122/80 mmHg. Lifelong never tobacco user.  Not diabetic.      ECG:  Sinus rhythm of 74 BPM with normal cardiac intervals.      CT coronary calcium study:  2016, as above        Exercise echocardiogram:  2017, normal without evidence of stress-induced ischemia.   The patient exercised for 10 METs.      Other labs:  Hemoglobin 15.6, TSH 1.96, creatinine 1.0, hemoglobin A1c 5.5%.      CURRENT MEDICATIONS:   1.  Amlodipine 5 mg daily.   2.  Aspirin 81 mg daily.   3.  Atorvastatin 20 mg daily.      PHYSICAL EXAMINATION:   VITAL SIGNS:  /80, pulse 84 per minute, height 1.7 m (5 feet 8 inches), weight 97.5 kg (215 pounds), sats 97% on room air, BMI 32.7 kg/m2. The patient's physical exam is unremarkable.   GENERAL:  He appears significantly younger than his stated age.  Has good muscle mass.  Alert, oriented x3.   CARDIOVASCULAR:  Normal JVP.  No vascular bruit.  Normal carotid pulse.  Normal apical impulse.  Regular heart sounds.  No murmurs.   RESPIRATORY:  Clear to auscultation.   ABDOMEN:  Soft, nontender.   VASCULAR:  Bilateral distal leg and all pulses well palpable.  No edema.      DIAGNOSES:   1.  Cardiovascular risk counseling.   2.  Coronary artery calcification.   3.  Recent diagnosis of benign essential hypertension.   4.  Family history of ischemic heart disease.      The patient is asymptomatic.  In view of his family history, recent diagnosis of hypertension and  elevated coronary artery calcium score, aggressive primary prevention is indicated.      ASSESSMENT/PLAN:     1.  Agree with aspirin 81 mg daily.     2.  Agree with amlodipine 5 mg daily.  Goal BP less than 140/80 mmHg.   3.  Even though his LDL is 50, it would be reasonable to increase his atorvastatin from 20 to 40 mg daily.  If he develops any statin-related myalgias, cut back to 20 mg daily.   4.  No indication for surveillance CT coronary calcium study or stress testing when the patient is asymptomatic and his risk factors are well controlled.   5.  Follow up with Cardiology as needed.      It was my pleasure to visit with this gentleman.  I wish him all the best.      cc:   Jacob Graham MD   [address] PARAG SALCEDO MD              D: 2018   T: 2018   MT: stuart      Name:     RICHARD FERNANDES   MRN:      [id number]        Account:      [id number]   :      1955           Service Date: 2018      Document: [id number]

## 2018-09-20 NOTE — MR AVS SNAPSHOT
After Visit Summary   9/20/2018    Billie Aden    MRN: [id number]           Patient Information     Date Of Birth          1955        Visit Information        Provider Department      9/20/2018 8:45 AM Akbar Avilez MD [facility]        Today's Diagnoses     Coronary artery calcification    -  1    Hyperlipidemia LDL goal <130          Care Instructions    I agree with the low-dose aspirin 81 mg and amlodipine 5 mg daily.  Your goal blood pressure would be less than 140/80 mmHg.  In order to maintain this, you may need to go up on the dosage of the amlodipine in the future.    Given your elevated coronary calcium score, I recommend increasing the atorvastatin from 20 mg to 40 mg daily.    No additional cardiac testing unless you develop symptoms.    If you have any questions or concerns, please contact my nurses at [phone].            Follow-ups after your visit        Who to contact     If you have questions or need follow up information about today's clinic visit or your schedule please contact [facility] directly at [phone].  Normal or non-critical lab and imaging results will be communicated to you by [employer]Leaf[employer], letter or phone within 4 business days after the clinic has received the results. If you do not hear from us within 7 days, please contact the clinic through [employer] or phone. If you have a critical or abnormal lab result, we will notify you by phone as soon as possible.  Submit refill requests through [employer] or call your pharmacy and they will forward the refill request to us. Please allow 3 business days for your refill to be completed.          Additional Information About Your Visit        MyChart Information     [employer] gives you secure access to your electronic health record. If you see a primary care provider, you can also send messages to your care team and make appointments. If  "you have questions, please call your primary care clinic.  If you do not have a primary care provider, please call [phone] and they will assist you.        Care EveryWhere ID     This is your Care EveryWhere ID. This could be used by other organizations to access your [facility] medical records  [id number]        Your Vitals Were     Pulse Height Pulse Oximetry BMI (Body Mass Index)          84 1.727 m (5' 8\") 97% 32.69 kg/m2         Blood Pressure from Last 3 Encounters:   09/20/18 122/80   08/22/18 (!) 144/94   08/21/18 156/85    Weight from Last 3 Encounters:   09/20/18 97.5 kg (215 lb)   08/22/18 98.4 kg (217 lb)   05/30/18 99.5 kg (219 lb 4.8 oz)              Today, you had the following     No orders found for display         Today's Medication Changes          These changes are accurate as of 9/20/18  9:24 AM.  If you have any questions, ask your nurse or doctor.               These medicines have changed or have updated prescriptions.        Dose/Directions    LIPITOR 20 MG tablet   This may have changed:  how much to take   Used for:  Hyperlipidemia LDL goal <130   Generic drug:  atorvastatin   Changed by:  Akbar Avilez MD        Dose:  40 mg   Take 2 tablets (40 mg) by mouth daily   Quantity:  90 tablet   Refills:  3         Stop taking these medicines if you haven't already. Please contact your care team if you have questions.     sildenafil 20 MG tablet   Commonly known as:  REVATIO   Stopped by:  Akbar Avilez MD                    Primary Care Provider Office Phone # Fax #    Jacob Graham [phone] [address] [facility] [address]  [place] [address]        Equal Access to Services     [facility] AH: Hadii tin Romo, waaxda luqadaha, qaybta kaalmada maricarmenyada, chelo noland. So [facility] [phone].    ATENCIÓN: Si habla español, tiene a bledsoe disposición servicios gratuitos de asistencia lingüística. Llame al " [phone].    We comply with applicable federal civil rights laws and [place] laws. We do not discriminate on the basis of race, color, national origin, age, disability, sex, sexual orientation, or gender identity.            Thank you!     Thank you for choosing [facility]  for your care. Our goal is always to provide you with excellent care. Hearing back from our patients is one way we can continue to improve our services. Please take a few minutes to complete the written survey that you may receive in the mail after your visit with us. Thank you!             Your Updated Medication List - Protect others around you: Learn how to safely use, store and throw away your medicines at www.disposemymeds.org.          This list is accurate as of 9/20/18  9:24 AM.  Always use your most recent med list.                   Brand Name Dispense Instructions for use Diagnosis    amLODIPine 5 MG tablet    NORVASC    90 tablet    Take 1 tablet (5 mg) by mouth daily    Essential hypertension, benign       aspirin 81 MG tablet     90 tablet    Take 1 tablet (81 mg) by mouth daily    Hyperlipidemia LDL goal <130       LIPITOR 20 MG tablet   Generic drug:  atorvastatin     90 tablet    Take 2 tablets (40 mg) by mouth daily    Hyperlipidemia LDL goal <130

## 2018-09-20 NOTE — PROGRESS NOTES
Clinic visit note dictated. Dictation reference number - [id number]        REVIEW OF SYSTEMS:  A comprehensive 10-point review of systems was completed and the pertinent positives are documented in the history of present illness.      ALLERGIES:  Allergies   Allergen Reactions     Dilaudid [Hydromorphone]      Agitation, did not help with pain     Morphine Anxiety       PAST MEDICAL HISTORY:    Past Medical History:   Diagnosis Date     BCC (basal cell carcinoma of skin) 11/2017    left lower lid     Cholelithiasis 10/8/2013     Hypertension 08/2018     Pelvic fracture (H) 11/2014    occured while horseback riding     Renal disease     kidney stone       PAST SURGICAL HISTORY:    Past Surgical History:   Procedure Laterality Date     CHOLECYSTECTOMY       HC CLOSED TX SHOULDER DISLOC W MANIP NO ANESTH       LAPAROSCOPIC CHOLECYSTECTOMY N/A 12/23/2014    Procedure: LAPAROSCOPIC CHOLECYSTECTOMY;  Surgeon: Avelino Bryson MD;  Location:  SD     REPAIR MOHS Left 1/25/2018    Procedure: REPAIR MOHS;  Left Lower Eyelid Mohs Reconstruction;  Surgeon: Claudia Melvin MD;  Location:  OR       FAMILY HISTORY:    Family History   Problem Relation Age of Onset     Cancer - colorectal Mother 79     Cardiovascular Father 70     Had a carotid endarterectomy at age 70 years.  Tobacco user.     Heart Surgery Brother 66     Alive. Had CABG at age 66 years. Smoker     Type 2 Diabetes Brother        SOCIAL HISTORY:    Social History     Social History     Marital status:      Spouse name: N/A     Number of children: 2     Years of education: N/A     Occupational History     remodels homes      Social History Main Topics     Smoking status: Never Smoker     Smokeless tobacco: Never Used     Alcohol use 0.0 oz/week     0 Standard drinks or equivalent per week      Comment: occasionally     Drug use: No     Sexual activity: Not Currently     Other Topics Concern     None     Social History Narrative    , 2 children     "Self Employed - semi-retired       PHYSICAL EXAM:    Vitals: /80  Pulse 84  Ht 1.727 m (5' 8\")  Wt 97.5 kg (215 lb)  SpO2 97%  BMI 32.69 kg/m2  Wt Readings from Last 5 Encounters:   09/20/18 97.5 kg (215 lb)   08/22/18 98.4 kg (217 lb)   05/30/18 99.5 kg (219 lb 4.8 oz)   04/24/18 98.3 kg (216 lb 12.8 oz)   01/25/18 100.2 kg (221 lb)       Constitutional: Comfortable at rest. Cooperative, alert and oriented, well developed, well nourished.  Eyes: Pupils equal and round, conjunctivae and lids unremarkable, sclera white, no xanthalasma,   ENT: Satisfactory dentition.  No cyanosis or pallor.  Neck: Carotid pulses are full and equal bilaterally, no carotid bruit, no thyromegaly     Respiratory: Normal respiratory effort with symmetrical chest wall movements and no use of accessory muscles. Good air entry with normal breath sounds and no rales or wheeze.  Cardiovascular: Normal jugular venous pulse and pressure.  Normal carotid pulse character and volume.  No carotid bruit.  Apical impulse is undisplaced and normal to palpation without parasternal heave or thrill.  Heart sounds are normal and regular. No audible murmur. No S3, S4 or friction rub.    GI: Soft, nontender, no hepatosplenomegaly, no masses, active bowel sounds.    Skin: No rash, erythema, ecchymosis.  Musculoskeletal: Normal muscle tone and strength. Normal gait. No spinal deformities.  Neuropsychiatric: Oriented to time place and person.  Affect normal.  No gross motor deficits.  Extremities: No edema. No clubbing or deformities.        Encounter Diagnoses   Name Primary?     Coronary artery calcification Yes     Benign essential hypertension      Family history of ischemic heart disease        No orders of the defined types were placed in this encounter.      CC  REFERRING PROVIDER:  Jacob Graham MD  [address] [facility] [address]  [place] [address]                "

## 2018-09-20 NOTE — PATIENT INSTRUCTIONS
I agree with the low-dose aspirin 81 mg and amlodipine 5 mg daily.  Your goal blood pressure would be less than 140/80 mmHg.  In order to maintain this, you may need to go up on the dosage of the amlodipine in the future.    Given your elevated coronary calcium score, I recommend increasing the atorvastatin from 20 mg to 40 mg daily.    No additional cardiac testing unless you develop symptoms.     If you have any questions or concerns, please contact my nurses at [phone].

## 2018-09-24 DIAGNOSIS — E78.5 HYPERLIPIDEMIA LDL GOAL <100: Primary | ICD-10-CM

## 2018-09-24 RX ORDER — ATORVASTATIN CALCIUM 20 MG/1
40 TABLET, FILM COATED ORAL DAILY
Qty: 180 TABLET | Refills: 3 | Status: SHIPPED | OUTPATIENT
Start: 2018-09-24 | End: 2019-08-30

## 2018-10-29 ENCOUNTER — TRANSFERRED RECORDS (OUTPATIENT)
Dept: HEALTH INFORMATION MANAGEMENT | Facility: CLINIC | Age: 63
End: 2018-10-29

## 2019-02-11 ENCOUNTER — ALLIED HEALTH/NURSE VISIT (OUTPATIENT)
Dept: FAMILY MEDICINE | Facility: CLINIC | Age: 64
End: 2019-02-11
Payer: COMMERCIAL

## 2019-02-11 VITALS — DIASTOLIC BLOOD PRESSURE: 84 MMHG | SYSTOLIC BLOOD PRESSURE: 134 MMHG

## 2019-02-11 DIAGNOSIS — Z01.30 BP CHECK: Primary | ICD-10-CM

## 2019-02-11 PROCEDURE — 99207 ZZC NO CHARGE NURSE ONLY: CPT | Performed by: INTERNAL MEDICINE

## 2019-02-11 NOTE — PROGRESS NOTES
Billie Aden is enrolled/participating in the retail pharmacy Blood Pressure Goals Achievement Program (BPGAP).  Billie Aden was evaluated at [facility], [place] on February 11, 2019 at which time his blood pressure was:    BP Readings from Last 3 Encounters:   02/11/19 134/84   09/20/18 122/80   08/22/18 (!) 144/94     Reviewed lifestyle modifications for blood pressure control and reduction: including making healthy food choices, managing weight, getting regular exercise, smoking cessation, reducing alcohol consumption, monitoring blood pressure regularly.     Billie Aden is not experiencing symptoms.    Follow-Up: BP is at goal of < 150/90 mmHg (patient 60+ years of age without diabetes).  Recommended follow-up at pharmacy in 6 months.     Recommendation to Provider: bp check within 6 months    Billie Aden was evaluated for enrollment into the PGEN study today.    PLEASE INITIATE ENROLLMENT DISCUSSION WITH HTN PTS  1) Between 30-80 years old                                                                                                               2) BMI between 19-50                                                                                                        3) BP ?140/90 AND ?170/110 patients aged 30-59         BP ?150/90 AND ?170/110 non-diabetic patients aged 60-80       BP ?140/90 AND ?170/110 diabetic patients aged 60-80  4) Additional requirements for uncontrolled HTN patients:        Pt on only 1 class of medication  5) EXCLUDE patient if confirmation of:                  ? Cardiac disease                  ? Chronic Kidney Disease                  ? Pregnancy/Breastfeeding                  ? Secondary Hypertension/Pre-eclampsia                                 ? Vascular disease    Patient eligible for enrollment:  No  Patient interested in enrollment:  No    This note completed by: Sherley Geller, PharmD, [facility]     [facility]  [phone]

## 2019-08-30 ENCOUNTER — OFFICE VISIT (OUTPATIENT)
Dept: FAMILY MEDICINE | Facility: CLINIC | Age: 64
End: 2019-08-30
Payer: COMMERCIAL

## 2019-08-30 VITALS
HEART RATE: 73 BPM | OXYGEN SATURATION: 94 % | TEMPERATURE: 97.8 F | DIASTOLIC BLOOD PRESSURE: 79 MMHG | SYSTOLIC BLOOD PRESSURE: 123 MMHG | HEIGHT: 69 IN | WEIGHT: 212 LBS | BODY MASS INDEX: 31.4 KG/M2

## 2019-08-30 DIAGNOSIS — Z00.00 ROUTINE GENERAL MEDICAL EXAMINATION AT A HEALTH CARE FACILITY: Primary | ICD-10-CM

## 2019-08-30 DIAGNOSIS — N18.30 CKD (CHRONIC KIDNEY DISEASE) STAGE 3, GFR 30-59 ML/MIN (H): ICD-10-CM

## 2019-08-30 DIAGNOSIS — I10 ESSENTIAL HYPERTENSION, BENIGN: ICD-10-CM

## 2019-08-30 DIAGNOSIS — E78.5 HYPERLIPIDEMIA LDL GOAL <100: ICD-10-CM

## 2019-08-30 LAB
ALBUMIN SERPL-MCNC: 4.2 G/DL (ref 3.4–5)
ALP SERPL-CCNC: 72 U/L (ref 40–150)
ALT SERPL W P-5'-P-CCNC: 50 U/L (ref 0–70)
ANION GAP SERPL CALCULATED.3IONS-SCNC: 5 MMOL/L (ref 3–14)
AST SERPL W P-5'-P-CCNC: 29 U/L (ref 0–45)
BILIRUB SERPL-MCNC: 3.4 MG/DL (ref 0.2–1.3)
BUN SERPL-MCNC: 18 MG/DL (ref 7–30)
CALCIUM SERPL-MCNC: 9 MG/DL (ref 8.5–10.1)
CHLORIDE SERPL-SCNC: 110 MMOL/L (ref 94–109)
CHOLEST SERPL-MCNC: 121 MG/DL
CO2 SERPL-SCNC: 27 MMOL/L (ref 20–32)
CREAT SERPL-MCNC: 1.06 MG/DL (ref 0.66–1.25)
ERYTHROCYTE [DISTWIDTH] IN BLOOD BY AUTOMATED COUNT: 12.6 % (ref 10–15)
GFR SERPL CREATININE-BSD FRML MDRD: 73 ML/MIN/{1.73_M2}
GLUCOSE SERPL-MCNC: 106 MG/DL (ref 70–99)
HBA1C MFR BLD: 5.5 % (ref 0–5.6)
HCT VFR BLD AUTO: 45.1 % (ref 40–53)
HDLC SERPL-MCNC: 42 MG/DL
HGB BLD-MCNC: 16.1 G/DL (ref 13.3–17.7)
LDLC SERPL CALC-MCNC: 55 MG/DL
MCH RBC QN AUTO: 33.7 PG (ref 26.5–33)
MCHC RBC AUTO-ENTMCNC: 35.7 G/DL (ref 31.5–36.5)
MCV RBC AUTO: 94 FL (ref 78–100)
NONHDLC SERPL-MCNC: 79 MG/DL
PLATELET # BLD AUTO: 171 10E9/L (ref 150–450)
POTASSIUM SERPL-SCNC: 4.2 MMOL/L (ref 3.4–5.3)
PROT SERPL-MCNC: 7.3 G/DL (ref 6.8–8.8)
RBC # BLD AUTO: 4.78 10E12/L (ref 4.4–5.9)
SODIUM SERPL-SCNC: 142 MMOL/L (ref 133–144)
TRIGL SERPL-MCNC: 120 MG/DL
WBC # BLD AUTO: 7.4 10E9/L (ref 4–11)

## 2019-08-30 PROCEDURE — 85027 COMPLETE CBC AUTOMATED: CPT | Performed by: INTERNAL MEDICINE

## 2019-08-30 PROCEDURE — 99396 PREV VISIT EST AGE 40-64: CPT | Performed by: INTERNAL MEDICINE

## 2019-08-30 PROCEDURE — 36415 COLL VENOUS BLD VENIPUNCTURE: CPT | Performed by: INTERNAL MEDICINE

## 2019-08-30 PROCEDURE — 80061 LIPID PANEL: CPT | Performed by: INTERNAL MEDICINE

## 2019-08-30 PROCEDURE — 83036 HEMOGLOBIN GLYCOSYLATED A1C: CPT | Performed by: INTERNAL MEDICINE

## 2019-08-30 PROCEDURE — 99213 OFFICE O/P EST LOW 20 MIN: CPT | Mod: 25 | Performed by: INTERNAL MEDICINE

## 2019-08-30 PROCEDURE — 80053 COMPREHEN METABOLIC PANEL: CPT | Performed by: INTERNAL MEDICINE

## 2019-08-30 RX ORDER — AMLODIPINE BESYLATE 5 MG/1
5 TABLET ORAL DAILY
Qty: 90 TABLET | Refills: 3 | Status: SHIPPED | OUTPATIENT
Start: 2019-08-30 | End: 2020-07-31

## 2019-08-30 RX ORDER — ATORVASTATIN CALCIUM 40 MG/1
40 TABLET, FILM COATED ORAL DAILY
Qty: 90 TABLET | Refills: 3 | Status: SHIPPED | OUTPATIENT
Start: 2019-08-30 | End: 2020-07-21

## 2019-08-30 RX ORDER — ATORVASTATIN CALCIUM 20 MG/1
40 TABLET, FILM COATED ORAL DAILY
Qty: 180 TABLET | Refills: 3 | Status: SHIPPED | OUTPATIENT
Start: 2019-08-30 | End: 2019-08-30

## 2019-08-30 ASSESSMENT — MIFFLIN-ST. JEOR: SCORE: 1734.07

## 2019-08-30 NOTE — PATIENT INSTRUCTIONS
(Z00.00) Routine general medical examination at a health care facility  (primary encounter diagnosis)  Comment: For routine exam, we will draw labs as ordered, cholesterol, diabetes mellitus check, liver function, renal function, and refer for colonoscopy.  We will also update vaccination history.  PSA was discussed and deferred as per USPTF guidelines  Plan: CBC with platelets, Lipid panel reflex to         direct LDL Fasting, Comprehensive metabolic         panel            (E78.5) Hyperlipidemia LDL goal <100  Comment: check fasting lipid panel and continue atorvastatin   Plan: atorvastatin (LIPITOR) 20 MG tablet            (I10) Essential hypertension, benign  Comment: blood pressure is well controlled today  Plan: amLODIPine (NORVASC) 5 MG tablet            (N18.3) CKD (chronic kidney disease) stage 3, GFR 30-59 ml/min (H)  Comment: check labs today  Plan:

## 2019-08-30 NOTE — PROGRESS NOTES
SUBJECTIVE:   CC: Billie Aden is an 63 year old male who presents for preventative health visit.     Healthy Habits:     Getting at least 3 servings of Calcium per day:  Yes    Bi-annual eye exam:  NO    Dental care twice a year:  Yes    Sleep apnea or symptoms of sleep apnea:  None    Diet:  Regular (no restrictions)    Frequency of exercise:  6-7 days/week    Duration of exercise:  45-60 minutes    Taking medications regularly:  Yes    Medication side effects:  Muscle aches    PHQ-2 Total Score: 0    Additional concerns today:  No      Today's PHQ-2 Score:   PHQ-2 ( 1999 Pfizer) 8/27/2019   Q1: Little interest or pleasure in doing things 0   Q2: Feeling down, depressed or hopeless 0   PHQ-2 Score 0   Q1: Little interest or pleasure in doing things Not at all   Q2: Feeling down, depressed or hopeless Not at all   PHQ-2 Score 0       Abuse: Current or Past(Physical, Sexual or Emotional)- No  Do you feel safe in your environment? Yes    Social History     Tobacco Use     Smoking status: Never Smoker     Smokeless tobacco: Never Used   Substance Use Topics     Alcohol use: Yes     Alcohol/week: 0.0 oz     Comment: occasionally         Alcohol Use 8/27/2019   Prescreen: >3 drinks/day or >7 drinks/week? No   Prescreen: >3 drinks/day or >7 drinks/week? -       Last PSA: No results found for: PSA    Reviewed orders with patient. Reviewed health maintenance and updated orders accordingly - Yes  Labs reviewed in EPIC    Reviewed and updated as needed this visit by clinical staff         Reviewed and updated as needed this visit by Provider        Past Medical History:   Diagnosis Date     Arthritis      BCC (basal cell carcinoma of skin) 11/2017    left lower lid     Cholelithiasis 10/8/2013     Heart disease      Hypertension 08/2018     Pelvic fracture (H) 11/2014    occured while horseback riding     Renal disease     kidney stone        Review of Systems  CONSTITUTIONAL: NEGATIVE for fever, chills, change in  "weight  INTEGUMENTARY/SKIN: NEGATIVE for worrisome rashes, moles or lesions  EYES: NEGATIVE for vision changes or irritation  ENT: NEGATIVE for ear, mouth and throat problems  RESP: NEGATIVE for significant cough or SOB  CV: NEGATIVE for chest pain, palpitations or peripheral edema  GI: NEGATIVE for nausea, abdominal pain, heartburn, or change in bowel habits   male: negative for dysuria, hematuria, decreased urinary stream, erectile dysfunction, urethral discharge  MUSCULOSKELETAL: NEGATIVE for significant arthralgias or myalgia  NEURO: NEGATIVE for weakness, dizziness or paresthesias  PSYCHIATRIC: NEGATIVE for changes in mood or affect    OBJECTIVE:   /79 (BP Location: Left arm, Patient Position: Chair, Cuff Size: Adult Large)   Pulse 73   Temp 97.8  F (36.6  C) (Oral)   Ht 1.74 m (5' 8.5\")   Wt 96.2 kg (212 lb)   SpO2 94%   BMI 31.77 kg/m      Physical Exam  GENERAL: healthy, alert and no distress  EYES: Eyes grossly normal to inspection, PERRL and conjunctivae and sclerae normal  HENT: ear canals and TM's normal, nose and mouth without ulcers or lesions  NECK: no adenopathy, no asymmetry, masses, or scars and thyroid normal to palpation  RESP: lungs clear to auscultation - no rales, rhonchi or wheezes  CV: regular rate and rhythm, normal S1 S2, no S3 or S4, no murmur, click or rub, no peripheral edema and peripheral pulses strong  ABDOMEN: soft, nontender, no hepatosplenomegaly, no masses and bowel sounds normal  RECTAL: normal sphincter tone, no rectal masses, prostate normal size, smooth, nontender without nodules or masses  MS: no gross musculoskeletal defects noted, no edema  SKIN: no suspicious lesions or rashes  NEURO: Normal strength and tone, mentation intact and speech normal  PSYCH: mentation appears normal, affect normal/bright    Diagnostic Test Results:  Labs reviewed in Epic    ASSESSMENT/PLAN:     Patient Instructions   (Z00.00) Routine general medical examination at [facility]" "facility  (primary encounter diagnosis)  Comment: For routine exam, we will draw labs as ordered, cholesterol, diabetes mellitus check, liver function, renal function, and refer for colonoscopy.  We will also update vaccination history.  PSA was discussed and deferred as per USPTF guidelines  Plan: CBC with platelets, Lipid panel reflex to         direct LDL Fasting, Comprehensive metabolic         panel            (E78.5) Hyperlipidemia LDL goal <100  Comment: check fasting lipid panel and continue atorvastatin   Plan: atorvastatin (LIPITOR) 20 MG tablet            (I10) Essential hypertension, benign  Comment: blood pressure is well controlled today  Plan: amLODIPine (NORVASC) 5 MG tablet            (N18.3) CKD (chronic kidney disease) stage 3, GFR 30-59 ml/min (H)  Comment: check labs today  Plan:           COUNSELING:   Reviewed preventive health counseling, as reflected in patient instructions    Estimated body mass index is 32.69 kg/m  as calculated from the following:    Height as of 9/20/18: 1.727 m (5' 8\").    Weight as of 9/20/18: 97.5 kg (215 lb).          reports that he has never smoked. He has never used smokeless tobacco.      Counseling Resources:  ATP IV Guidelines  Pooled Cohorts Equation Calculator  FRAX Risk Assessment  ICSI Preventive Guidelines  Dietary Guidelines for Americans, 2010  USDA's MyPlate  ASA Prophylaxis  Lung CA Screening    Jacob Graham MD, MD  [facility]  "

## 2019-08-31 NOTE — RESULT ENCOUNTER NOTE
Martinez Wise,    I had the opportunity to review your recent labs and a summary of your labs reads as follows:    Your complete blood counts show no sign of anemia, normal white blood cell count and platelets.  Your comprehensive metabolic panel showed normal renal function, normal liver function, and stable elevated fasting blood glucose indicating no evidence of diabetes mellitus.  Your hemoglobin A1c shows also stable average blood glucose   Your fasting lipid panel show  - normal HDL (good) cholesterol -as your goal is greater than 40  - low LDL (bad) cholesterol as your goal is less than 130  - normal triglyceride levels    Congratulaions on your excellent results       Sincerely,  Jacob Graham MD

## 2019-09-23 ENCOUNTER — TELEPHONE (OUTPATIENT)
Dept: FAMILY MEDICINE | Facility: CLINIC | Age: 64
End: 2019-09-23

## 2019-09-23 NOTE — TELEPHONE ENCOUNTER
Reason for Call:  Medication or medication refill:Refill    Do you use a [facility]?  Name of the pharmacy and phone number for the current request:       [id number] - [place], [place] - [address] AT [facility] [address]      Name of the medication requested:   amLODIPine (NORVASC) 5 MG tablet    Other request    Can we leave a detailed message on this number? YES    Phone number patient can be reached at: Home number on file [phone] (home)    Best Time: any    Call taken on 9/23/2019 at 11:18 AM by Nahed Melchor

## 2019-12-09 ENCOUNTER — HEALTH MAINTENANCE LETTER (OUTPATIENT)
Age: 64
End: 2019-12-09

## 2020-02-13 ENCOUNTER — APPOINTMENT (OUTPATIENT)
Age: 65
Setting detail: DERMATOLOGY
End: 2020-02-13

## 2020-02-13 VITALS — HEIGHT: 69 IN | WEIGHT: 215 LBS

## 2020-02-13 DIAGNOSIS — L85.3 XEROSIS CUTIS: ICD-10-CM

## 2020-02-13 DIAGNOSIS — L81.8 OTHER SPECIFIED DISORDERS OF PIGMENTATION: ICD-10-CM

## 2020-02-13 DIAGNOSIS — L57.0 ACTINIC KERATOSIS: ICD-10-CM

## 2020-02-13 DIAGNOSIS — L60.9 NAIL DISORDER, UNSPECIFIED: ICD-10-CM

## 2020-02-13 DIAGNOSIS — Z71.89 OTHER SPECIFIED COUNSELING: ICD-10-CM

## 2020-02-13 DIAGNOSIS — I78.8 OTHER DISEASES OF CAPILLARIES: ICD-10-CM

## 2020-02-13 DIAGNOSIS — D22 MELANOCYTIC NEVI: ICD-10-CM

## 2020-02-13 DIAGNOSIS — D18.0 HEMANGIOMA: ICD-10-CM

## 2020-02-13 DIAGNOSIS — L72.0 EPIDERMAL CYST: ICD-10-CM

## 2020-02-13 DIAGNOSIS — L81.4 OTHER MELANIN HYPERPIGMENTATION: ICD-10-CM

## 2020-02-13 DIAGNOSIS — L73.8 OTHER SPECIFIED FOLLICULAR DISORDERS: ICD-10-CM

## 2020-02-13 DIAGNOSIS — L82.1 OTHER SEBORRHEIC KERATOSIS: ICD-10-CM

## 2020-02-13 PROBLEM — D18.01 HEMANGIOMA OF SKIN AND SUBCUTANEOUS TISSUE: Status: ACTIVE | Noted: 2020-02-13

## 2020-02-13 PROBLEM — D22.5 MELANOCYTIC NEVI OF TRUNK: Status: ACTIVE | Noted: 2020-02-13

## 2020-02-13 PROCEDURE — 17000 DESTRUCT PREMALG LESION: CPT

## 2020-02-13 PROCEDURE — 99214 OFFICE O/P EST MOD 30 MIN: CPT | Mod: 25

## 2020-02-13 PROCEDURE — OTHER ADDITIONAL NOTES: OTHER

## 2020-02-13 PROCEDURE — OTHER COUNSELING: OTHER

## 2020-02-13 PROCEDURE — 17003 DESTRUCT PREMALG LES 2-14: CPT

## 2020-02-13 PROCEDURE — OTHER LIQUID NITROGEN: OTHER

## 2020-02-13 ASSESSMENT — LOCATION DETAILED DESCRIPTION DERM
LOCATION DETAILED: RIGHT DISTAL PLANTAR GREAT TOE
LOCATION DETAILED: NASAL TIP
LOCATION DETAILED: RIGHT CENTRAL FRONTAL SCALP
LOCATION DETAILED: RIGHT DISTAL CALF
LOCATION DETAILED: INFERIOR THORACIC SPINE
LOCATION DETAILED: RIGHT INFERIOR CENTRAL MALAR CHEEK
LOCATION DETAILED: LEFT GREAT TOENAIL
LOCATION DETAILED: LEFT DISTAL CALF
LOCATION DETAILED: SUPERIOR MID FOREHEAD

## 2020-02-13 ASSESSMENT — LOCATION SIMPLE DESCRIPTION DERM
LOCATION SIMPLE: RIGHT CALF
LOCATION SIMPLE: RIGHT CHEEK
LOCATION SIMPLE: RIGHT SCALP
LOCATION SIMPLE: SUPERIOR FOREHEAD
LOCATION SIMPLE: LEFT CALF
LOCATION SIMPLE: UPPER BACK
LOCATION SIMPLE: LEFT GREAT TOE
LOCATION SIMPLE: RIGHT GREAT TOE
LOCATION SIMPLE: NOSE

## 2020-02-13 ASSESSMENT — LOCATION ZONE DERM
LOCATION ZONE: TOENAIL
LOCATION ZONE: NOSE
LOCATION ZONE: SCALP
LOCATION ZONE: FACE
LOCATION ZONE: LEG
LOCATION ZONE: TRUNK
LOCATION ZONE: TOE

## 2020-02-13 NOTE — PROCEDURE: LIQUID NITROGEN
Duration Of Freeze Thaw-Cycle (Seconds): 0
Detail Level: Simple
Render Note In Bullet Format When Appropriate: No
Post-Care Instructions: I reviewed with the patient in detail post-care instructions. Patient is to wear sunprotection, and avoid picking at any of the treated lesions. Pt may apply Vaseline to crusted or scabbing areas.
Consent: The patient's consent was obtained including but not limited to risks of crusting, scabbing, blistering, scarring, darker or lighter pigmentary change, recurrence, incomplete removal and infection.

## 2020-03-20 ENCOUNTER — VIRTUAL VISIT (OUTPATIENT)
Dept: FAMILY MEDICINE | Facility: CLINIC | Age: 65
End: 2020-03-20
Payer: COMMERCIAL

## 2020-03-20 DIAGNOSIS — R50.9 FEVER, UNSPECIFIED FEVER CAUSE: Primary | ICD-10-CM

## 2020-03-20 PROCEDURE — 99441 ZZC PHYSICIAN TELEPHONE EVALUATION 5-10 MIN: CPT | Performed by: INTERNAL MEDICINE

## 2020-03-20 NOTE — PROGRESS NOTES
"Billie Aden is a 64 year old male who is being evaluated via a telephone visit.      The patient has been notified of following (by PRAKASH Barrera CMA      \"We have found that certain health care needs can be provided without the need for a physical exam.  This service lets us provide the care you need with a short phone conversation.  If a prescription is necessary we can send it directly to your pharmacy.  If lab work is needed we can place an order for that and you can then stop by our lab to have the test done at a later time.    This telephone visit will be conducted via 3 way call with the you (the patient) , the physician/provider, and a me all on the line at the same time.  This allows your physician/provider to have the phone conversation with you while I will be taking notes for your medical record.  We will have full access to your [facility] medical record during this entire phone call.    Since this is like an office visit,  will bill your insurance company for this service.  Please check with your medical insurance if this type of telephone/virtual is covered . You may be responsible for the cost of this service if insurance coverage is denied.  The typical cost is $30 (10min), $59(11-20min) and $85 (21-30min)     If during the course of the call the physician/provider feels a telephone visit is not appropriate, you will not be charged for this service\"    Consent has been obtained for this service by care team member: yes.  See the scanned image in the medical record.    S: The history as provided by the patient to the provider during this 3 way call include     Pertinent parts of the the patient's medical history reviewed and confirmed by the provider included :     I spoke with Billie with regards to his recent fever and twisting abdominal pain.  He is not had any vomiting but has significant nausea and is hard for him to eat and drink anything.  He has been taking Tylenol 1000 mg every 4 " hours which advised against and told him he should only take 1000 g every 6 hours with a max of 4 g/day.  His most recent fever was 101.4  F.  He is not having any symptoms of shortness of breath or coughing.  He does not have any indigestion at baseline.  His stools have been normal.  I recommended that he continue to monitor and treat fever over the weekend.  Unfortunately we do not have testing available for the cold lead to ensure that he does not have this virus, but he may.  Due to the fever issue he has been advised not to come to the clinic.  Ultimately if his abdominal pain worsens or is having any significant change in his condition advised to go to the emergency room.  He was agreeable to this plan.  I also gave him instructions for potentially getting tested for coronavirus.    Instructions for Patients  It is recommended that you setup a virtual visit with one of our virtual providers.  To do this follow these instructions:    1. Go to the website [email or web address]  2. Create an account (you will need your insurance information)  3. Start a new visit  4. Choose your diagnosis (e.g. COVID19)  5. Fill out the information about your symptoms  6. A provider will reach out to you by text, phone call or video visit based on your request    While you are at home please follow these instructions to care for yourself:    Regardless of if you have been tested or not:  Patient who have symptoms (cough, fever, or shortness of breath), need to isolate for 7 days from when symptoms started OR 72 hours after fever resolves (without fever reducing medications) AND improvement of respiratory symptoms (whichever is longer).      Isolate yourself at home (in own room/own bathroom if possible)    Do Not allow any visitors    Do Not go to work or school    Do Not go to [employer],  centers, shopping, or other public places.    Do Not shake hands.    Avoid close and intimate contact with others (hugging,  kissing).    Follow CDC recommendations for household cleaning of frequently touched services.     After the initial 7 days, continue to isolate yourself from household members as much as possible. To continue decrease the risk of community spread and exposure, you and any members of your household should limit activities in public for 14 days after starting home isolation.     You can reference the following [facility] link for helpful home isolation/care tips:  https://www.cdc.gov/coronavirus/2019-ncov/downloads/10Things.pdf    Protect Others:    Cover Your Mouth and Nose with a mask, disposable tissue or wash cloth to avoid spreading germs to others.    Wash your hands and face frequently with soap and water.    Fever Medicines:    For fever relief, take acetaminophen or ibuprofen.    Treat fevers above 101  F (38.3  C) to lower fevers and make you more comfortable.     Acetaminophen (e.g., Tylenol): Take 650 mg (two 325 mg pills) by mouth every 4-6 hours as needed of regular strength Tylenol or 1,000 mg (two 500 mg pills) every 8 hours as needed of Extra Strength Tylenol.     Ibuprofen (e.g., Motrin, Advil): Take 400 mg (two 200 mg pills) by mouth every 6 hours as needed.     Acetaminophen is thought to be safer than ibuprofen or naproxen for people over 65 years old. Acetaminophen is in many OTC and prescription medicines. It might be in more than one medicine that you are taking. You need to be careful and not take an overdose. Before taking any medicine, read all the instructions on the package.    Caution - NSAIDs (e.g., ibuprofen, naproxen): Do not take nonsteroidal anti-inflammatory drugs (NSAIDs) if you have stomach problems, kidney disease, heart failure, or other contraindications to using this type of medicine. Do not take NSAID medicines for over 7 days without consulting your PCP. Do not take NSAID medicines if you are pregnant. Do not take NSAID medicines if you are also taking blood thinners.     Call  Back If: Breathing difficulty develops or you become worse.    Thank you for limiting contact with others, wearing a simple mask to cover your cough, practice good hand hygiene habits and accessing our virtual services where possible to limit the spread of this virus.    For more information about COVID19 and options for caring for yourself at home, please visit the CDC website at https://www.cdc.gov/coronavirus/2019-ncov/about/steps-when-sick.html  For more options for care at [facility], please visit our website at [email or web address]        Total time of call between patient and provider was 8 minutes     Jacob Graham MD, MD  (MD signature)  ===================================================    I have reviewed the note as documented above.  This accurately captures the substance of my conversation with the patient,    Additional provider notes:    Assessment/Plan:  No diagnosis found.

## 2020-03-27 ENCOUNTER — HOSPITAL ENCOUNTER (INPATIENT)
Facility: CLINIC | Age: 65
LOS: 3 days | Discharge: HOME | DRG: 442 | End: 2020-03-30
Attending: NURSE PRACTITIONER | Admitting: HOSPITALIST
Payer: COMMERCIAL

## 2020-03-27 ENCOUNTER — APPOINTMENT (OUTPATIENT)
Dept: GENERAL RADIOLOGY | Facility: CLINIC | Age: 65
DRG: 442 | End: 2020-03-27
Attending: NURSE PRACTITIONER
Payer: COMMERCIAL

## 2020-03-27 ENCOUNTER — VIRTUAL VISIT (OUTPATIENT)
Dept: FAMILY MEDICINE | Facility: CLINIC | Age: 65
End: 2020-03-27
Payer: COMMERCIAL

## 2020-03-27 ENCOUNTER — VIRTUAL VISIT (OUTPATIENT)
Dept: FAMILY MEDICINE | Facility: OTHER | Age: 65
End: 2020-03-27

## 2020-03-27 ENCOUNTER — APPOINTMENT (OUTPATIENT)
Dept: CT IMAGING | Facility: CLINIC | Age: 65
DRG: 442 | End: 2020-03-27
Attending: NURSE PRACTITIONER
Payer: COMMERCIAL

## 2020-03-27 ENCOUNTER — OFFICE VISIT (OUTPATIENT)
Dept: URGENT CARE | Facility: URGENT CARE | Age: 65
End: 2020-03-27
Payer: COMMERCIAL

## 2020-03-27 VITALS
DIASTOLIC BLOOD PRESSURE: 92 MMHG | OXYGEN SATURATION: 99 % | HEART RATE: 94 BPM | SYSTOLIC BLOOD PRESSURE: 143 MMHG | TEMPERATURE: 98.3 F

## 2020-03-27 DIAGNOSIS — N39.0 E. COLI UTI: Primary | ICD-10-CM

## 2020-03-27 DIAGNOSIS — N39.0 URINARY TRACT INFECTION: ICD-10-CM

## 2020-03-27 DIAGNOSIS — I81 PORTAL VEIN THROMBOSIS: ICD-10-CM

## 2020-03-27 DIAGNOSIS — B96.20 E. COLI UTI: Primary | ICD-10-CM

## 2020-03-27 DIAGNOSIS — R10.13 ABDOMINAL PAIN, EPIGASTRIC: ICD-10-CM

## 2020-03-27 DIAGNOSIS — R50.9 FEVER, UNSPECIFIED FEVER CAUSE: Primary | ICD-10-CM

## 2020-03-27 DIAGNOSIS — K55.069 MESENTERIC VEIN THROMBOSIS (H): ICD-10-CM

## 2020-03-27 DIAGNOSIS — R74.01 TRANSAMINITIS: ICD-10-CM

## 2020-03-27 DIAGNOSIS — R10.84 ABDOMINAL PAIN, GENERALIZED: Primary | ICD-10-CM

## 2020-03-27 LAB
ALBUMIN SERPL-MCNC: 2.8 G/DL (ref 3.4–5)
ALBUMIN UR-MCNC: 30 MG/DL
ALP SERPL-CCNC: 159 U/L (ref 40–150)
ALT SERPL W P-5'-P-CCNC: 152 U/L (ref 0–70)
ANION GAP SERPL CALCULATED.3IONS-SCNC: 3 MMOL/L (ref 3–14)
APPEARANCE UR: ABNORMAL
AST SERPL W P-5'-P-CCNC: 102 U/L (ref 0–45)
BACTERIA #/AREA URNS HPF: ABNORMAL /HPF
BASOPHILS # BLD AUTO: 0 10E9/L (ref 0–0.2)
BASOPHILS NFR BLD AUTO: 0.2 %
BILIRUB SERPL-MCNC: 1 MG/DL (ref 0.2–1.3)
BILIRUB UR QL STRIP: NEGATIVE
BUN SERPL-MCNC: 16 MG/DL (ref 7–30)
CALCIUM SERPL-MCNC: 8.9 MG/DL (ref 8.5–10.1)
CHLORIDE SERPL-SCNC: 104 MMOL/L (ref 94–109)
CO2 SERPL-SCNC: 30 MMOL/L (ref 20–32)
COLOR UR AUTO: YELLOW
CREAT SERPL-MCNC: 0.96 MG/DL (ref 0.66–1.25)
DIFFERENTIAL METHOD BLD: ABNORMAL
EOSINOPHIL # BLD AUTO: 0.3 10E9/L (ref 0–0.7)
EOSINOPHIL NFR BLD AUTO: 2.7 %
ERYTHROCYTE [DISTWIDTH] IN BLOOD BY AUTOMATED COUNT: 12.4 % (ref 10–15)
GFR SERPL CREATININE-BSD FRML MDRD: 83 ML/MIN/{1.73_M2}
GLUCOSE SERPL-MCNC: 107 MG/DL (ref 70–99)
GLUCOSE UR STRIP-MCNC: NEGATIVE MG/DL
HCT VFR BLD AUTO: 38.3 % (ref 40–53)
HGB BLD-MCNC: 13.1 G/DL (ref 13.3–17.7)
HGB UR QL STRIP: NEGATIVE
IMM GRANULOCYTES # BLD: 0 10E9/L (ref 0–0.4)
IMM GRANULOCYTES NFR BLD: 0.4 %
KETONES UR STRIP-MCNC: 5 MG/DL
LACTATE BLD-SCNC: 1.5 MMOL/L (ref 0.7–2)
LEUKOCYTE ESTERASE UR QL STRIP: ABNORMAL
LIPASE SERPL-CCNC: 382 U/L (ref 73–393)
LYMPHOCYTES # BLD AUTO: 1.2 10E9/L (ref 0.8–5.3)
LYMPHOCYTES NFR BLD AUTO: 12.4 %
MCH RBC QN AUTO: 32.8 PG (ref 26.5–33)
MCHC RBC AUTO-ENTMCNC: 34.2 G/DL (ref 31.5–36.5)
MCV RBC AUTO: 96 FL (ref 78–100)
MONOCYTES # BLD AUTO: 0.5 10E9/L (ref 0–1.3)
MONOCYTES NFR BLD AUTO: 4.8 %
MUCOUS THREADS #/AREA URNS LPF: PRESENT /LPF
NEUTROPHILS # BLD AUTO: 7.5 10E9/L (ref 1.6–8.3)
NEUTROPHILS NFR BLD AUTO: 79.5 %
NITRATE UR QL: POSITIVE
NRBC # BLD AUTO: 0 10*3/UL
NRBC BLD AUTO-RTO: 0 /100
PH UR STRIP: 6 PH (ref 5–7)
PLATELET # BLD AUTO: 389 10E9/L (ref 150–450)
POTASSIUM SERPL-SCNC: 3.9 MMOL/L (ref 3.4–5.3)
PROT SERPL-MCNC: 7.8 G/DL (ref 6.8–8.8)
RBC # BLD AUTO: 3.99 10E12/L (ref 4.4–5.9)
RBC #/AREA URNS AUTO: 0 /HPF (ref 0–2)
SODIUM SERPL-SCNC: 137 MMOL/L (ref 133–144)
SOURCE: ABNORMAL
SP GR UR STRIP: 1.02 (ref 1–1.03)
SQUAMOUS #/AREA URNS AUTO: 1 /HPF (ref 0–1)
UROBILINOGEN UR STRIP-MCNC: 4 MG/DL (ref 0–2)
WBC # BLD AUTO: 9.4 10E9/L (ref 4–11)
WBC #/AREA URNS AUTO: 16 /HPF (ref 0–5)

## 2020-03-27 PROCEDURE — 80053 COMPREHEN METABOLIC PANEL: CPT | Performed by: NURSE PRACTITIONER

## 2020-03-27 PROCEDURE — 25000125 ZZHC RX 250: Performed by: NURSE PRACTITIONER

## 2020-03-27 PROCEDURE — 25800030 ZZH RX IP 258 OP 636: Performed by: NURSE PRACTITIONER

## 2020-03-27 PROCEDURE — 83690 ASSAY OF LIPASE: CPT | Performed by: NURSE PRACTITIONER

## 2020-03-27 PROCEDURE — 99214 OFFICE O/P EST MOD 30 MIN: CPT | Performed by: PHYSICIAN ASSISTANT

## 2020-03-27 PROCEDURE — 96375 TX/PRO/DX INJ NEW DRUG ADDON: CPT

## 2020-03-27 PROCEDURE — 87186 SC STD MICRODIL/AGAR DIL: CPT | Performed by: NURSE PRACTITIONER

## 2020-03-27 PROCEDURE — 87086 URINE CULTURE/COLONY COUNT: CPT | Performed by: NURSE PRACTITIONER

## 2020-03-27 PROCEDURE — 81001 URINALYSIS AUTO W/SCOPE: CPT | Performed by: NURSE PRACTITIONER

## 2020-03-27 PROCEDURE — 25000128 H RX IP 250 OP 636: Performed by: NURSE PRACTITIONER

## 2020-03-27 PROCEDURE — 99285 EMERGENCY DEPT VISIT HI MDM: CPT | Mod: 25

## 2020-03-27 PROCEDURE — 74177 CT ABD & PELVIS W/CONTRAST: CPT

## 2020-03-27 PROCEDURE — 71045 X-RAY EXAM CHEST 1 VIEW: CPT

## 2020-03-27 PROCEDURE — 12000000 ZZH R&B MED SURG/OB

## 2020-03-27 PROCEDURE — 99207 ZZC NO BILLABLE SERVICE THIS VISIT: CPT | Performed by: INTERNAL MEDICINE

## 2020-03-27 PROCEDURE — 83605 ASSAY OF LACTIC ACID: CPT | Performed by: NURSE PRACTITIONER

## 2020-03-27 PROCEDURE — 87088 URINE BACTERIA CULTURE: CPT | Performed by: NURSE PRACTITIONER

## 2020-03-27 PROCEDURE — 99223 1ST HOSP IP/OBS HIGH 75: CPT | Mod: AI | Performed by: HOSPITALIST

## 2020-03-27 PROCEDURE — 85025 COMPLETE CBC W/AUTO DIFF WBC: CPT | Performed by: NURSE PRACTITIONER

## 2020-03-27 PROCEDURE — 96361 HYDRATE IV INFUSION ADD-ON: CPT

## 2020-03-27 RX ORDER — IOPAMIDOL 755 MG/ML
106 INJECTION, SOLUTION INTRAVASCULAR ONCE
Status: COMPLETED | OUTPATIENT
Start: 2020-03-27 | End: 2020-03-27

## 2020-03-27 RX ORDER — LEVOFLOXACIN 5 MG/ML
750 INJECTION, SOLUTION INTRAVENOUS ONCE
Status: COMPLETED | OUTPATIENT
Start: 2020-03-27 | End: 2020-03-28

## 2020-03-27 RX ORDER — HYDROMORPHONE HYDROCHLORIDE 1 MG/ML
0.5 INJECTION, SOLUTION INTRAMUSCULAR; INTRAVENOUS; SUBCUTANEOUS
Status: DISCONTINUED | OUTPATIENT
Start: 2020-03-27 | End: 2020-03-28 | Stop reason: ALTCHOICE

## 2020-03-27 RX ORDER — CEFTRIAXONE 1 G/1
1 INJECTION, POWDER, FOR SOLUTION INTRAMUSCULAR; INTRAVENOUS ONCE
Status: COMPLETED | OUTPATIENT
Start: 2020-03-27 | End: 2020-03-27

## 2020-03-27 RX ADMIN — IOPAMIDOL 106 ML: 755 INJECTION, SOLUTION INTRAVENOUS at 22:05

## 2020-03-27 RX ADMIN — SODIUM CHLORIDE 70 ML: 9 INJECTION, SOLUTION INTRAVENOUS at 22:05

## 2020-03-27 RX ADMIN — CEFTRIAXONE 1 G: 1 INJECTION, POWDER, FOR SOLUTION INTRAMUSCULAR; INTRAVENOUS at 22:26

## 2020-03-27 RX ADMIN — SODIUM CHLORIDE 1000 ML: 9 INJECTION, SOLUTION INTRAVENOUS at 21:16

## 2020-03-27 ASSESSMENT — ENCOUNTER SYMPTOMS
NECK PAIN: 0
CHILLS: 1
CONSTIPATION: 0
DIARRHEA: 0
WEAKNESS: 1
NAUSEA: 1
VOMITING: 0
ABDOMINAL PAIN: 1
CHEST TIGHTNESS: 0
SHORTNESS OF BREATH: 0
COUGH: 0
BLOOD IN STOOL: 0
FEVER: 1
HEADACHES: 0
FATIGUE: 1
LIGHT-HEADEDNESS: 0
MYALGIAS: 1
SORE THROAT: 0
BACK PAIN: 0
EYE PAIN: 0
DYSURIA: 0

## 2020-03-27 NOTE — PROGRESS NOTES
"Date: 2020 09:50:55  Clinician: Karma Machuca  Clinician NPI: [id number]  Patient: Billie Aden  Patient : 1955  Patient Address: [address] [place] [address]  Patient Phone: [phone]  Visit Protocol: URI  Patient Summary:  Billie is a 64 year old ( : 1955 ) male who initiated a Visit for COVID-19 (Coronavirus) evaluation and screening. When asked the question \"Please sign me up to receive news, health information and promotions from [employer].\", Billie responded \"No\".    Billie states his symptoms started suddenly 7-9 days ago. After his symptoms started, they improved and then got worse again.   His symptoms consist of wheezing, nasal congestion, malaise, enlarged lymph nodes, a headache, facial pain or pressure, myalgia, and chills. He is experiencing mild difficulty breathing with activities but can speak normally in full sentences. Billie also feels feverish.   Symptom details     Nasal secretions: The color of his mucus is green.    Temperature: His current temperature is 100.4 degrees Fahrenheit. Billie has had a temperature over 100 degrees Fahrenheit for more than 7 days.     Wheezing: Billie has not ever been diagnosed with asthma. The wheezing interferes with his normal daily activities.    Facial pain or pressure: The facial pain or pressure feels worse when bending over or leaning forward.     Headache: He states the headache is moderate (4-6 on a 10 point pain scale).      Billie denies having ear pain, rhinitis, sore throat, cough, and teeth pain. He also denies taking antibiotic medication for the symptoms and having recent facial or sinus surgery in the past 60 days.   Precipitating events  He has not recently been exposed to someone with influenza. Billie has not been in close contact with any high risk individuals.   Pertinent COVID-19 (Coronavirus) information  Billie has not traveled internationally or to the areas where COVID-19 (Coronavirus) is widespread, " including cruise ship travel in the last 14 days before the start of his symptoms.   Billie has not had a close contact with a laboratory-confirmed COVID-19 patient within 14 days of symptom onset. He also has not had a close contact with a suspected COVID-19 patient within 14 days of symptom onset.   Billie is not a healthcare worker or a  and does not work in a healthcare facility. He does not live with a healthcare worker.   Triage Point(s) temporarily suspended for COVID-19 (Coronavirus) screening  Billie reported the following symptoms which were previously protocol referral points. These protocol referral points have temporarily been removed for purposes of COVID-19 (Coronavirus) screening.     Wheezing that keeps Billie from doing daily activities    Meets at least 3/5 centor score criteria     Swollen lymph nodes    Temp over 100    Absence of cough           Pertinent medical history  Billie had 2 sinus infections within the past year.   Billie does not need a return to work/school note.   Weight: 215 lbs   Billie does not smoke or use smokeless tobacco.   Additional information as reported by the patient (free text): no energy, fever does not go away!   Weight: 215 lbs    MEDICATIONS: atorvastatin oral, ALLERGIES: NKDA  Clinician Response:  Dear Billie,   Based on the information you have provided, further evaluation in urgent care is indicated. You may be seen in one of our designated urgent care sites that is prepared to see patients who have symptoms that could indicate Coronavirus (Covid-19).   For your information: At this time we will NOT perform coronavirus testing in urgent care sites. This test is currently being reserved for patients who are being admitted to the hospital.  Our locations: [facility], [place], [place], [facility], [place]), [place] and [place]  Please call [address] [place] [address] ([phone]) to schedule an urgent care  appointment at one of our urgent care or walk in care sites. It is essential that you tell them to let the  know that they were referred to be seen in urgent care from [facility]  PLEASE BRING DOCUMENTATION FROM THIS COMPLETED [facility] VISIT TO YOUR URGENT CARE VISIT.     For more information about COVID19 and options for caring for yourself at home, please visit the CDC website at https://www.cdc.gov/coronavirus/2019-ncov/about/steps-when-sick.html  For more options for care at [facility], please visit our website at [email or web address]    Diagnosis: Coronavirus infection, unspecified  Diagnosis ICD: B34.2

## 2020-03-27 NOTE — PROGRESS NOTES
"Billie Aden is a 64 year old male who is being evaluated via a telephone visit.      The patient has been notified of following (by PRAKASH BARFIELD CMA     \"We have found that certain health care needs can be provided without the need for a physical exam.  This service lets us provide the care you need with a short phone conversation.  If a prescription is necessary we can send it directly to your pharmacy.  If lab work is needed we can place an order for that and you can then stop by our lab to have the test done at a later time.    This telephone visit will be conducted via 3 way call with the you (the patient) , the physician/provider, and a me all on the line at the same time.  This allows your physician/provider to have the phone conversation with you while I will be taking notes for your medical record.  We will have full access to your [facility] medical record during this entire phone call.    Since this is like an office visit,  will bill your insurance company for this service.  Please check with your medical insurance if this type of telephone/virtual is covered . You may be responsible for the cost of this service if insurance coverage is denied.  The typical cost is $30 (10min), $59(11-20min) and $85 (21-30min)     If during the course of the call the physician/provider feels a telephone visit is not appropriate, you will not be charged for this service\"    Consent has been obtained for this service by care team member: yes.  See the scanned image in the medical record.    S: The history as provided by the patient to the provider during this 3 way call include     Pertinent parts of the the patient's medical history reviewed and confirmed by the provider included : I spoke with Billie with regards to his fever which is now ongoing for 10 days.  His twisting abdominal pain has seemed to improve he believes.  He is maintaining a good appetite and drinking plenty of fluids.  He still has some right upper " quadrant abdominal tenderness at times, but there is no other localizing abdominal symptoms that he is aware of.  He notes change that he now has some chest symptoms, but they are not extreme and he relates this to being inactive for the last 10 days.  His fever this morning was up to 101.4 again, but with Tylenol he was able to reduce it down to 99 degrees.  I asked him to revisit the [facility] website and see if he can get testing for coronavirus.  My belief is that if he can get tested and he proved negative and we could potentially get him into the clinic for imaging and labs.  If he test positive then we have our diagnosis.  I will also look into possible ways to facilitate getting his test at this time.    6:47 PM I spoke with Billie Aden and symptoms have not changed.  He feels well.  He did have an [facility] visit and they recommended he be seen in the clinic.  Given after hours, I recommended he come in to urgent care for office visit.    Total time of call between patient and provider was 11 minutes     Jacob Graham MD, MD  (MD signature)  ===================================================    I have reviewed the note as documented above.  This accurately captures the substance of my conversation with the patient,    Additional provider notes:    Assessment/Plan:  No diagnosis found.

## 2020-03-28 PROBLEM — R10.9 ABDOMINAL PAIN: Status: ACTIVE | Noted: 2020-03-28

## 2020-03-28 LAB
ALBUMIN SERPL-MCNC: 2.5 G/DL (ref 3.4–5)
ALP SERPL-CCNC: 141 U/L (ref 40–150)
ALT SERPL W P-5'-P-CCNC: 118 U/L (ref 0–70)
ANION GAP SERPL CALCULATED.3IONS-SCNC: 4 MMOL/L (ref 3–14)
APTT PPP: 30 SEC (ref 22–37)
AST SERPL W P-5'-P-CCNC: 69 U/L (ref 0–45)
AT III ACT/NOR PPP CHRO: 113 % (ref 85–135)
BILIRUB SERPL-MCNC: 1.4 MG/DL (ref 0.2–1.3)
BUN SERPL-MCNC: 12 MG/DL (ref 7–30)
CALCIUM SERPL-MCNC: 8.4 MG/DL (ref 8.5–10.1)
CHLORIDE SERPL-SCNC: 105 MMOL/L (ref 94–109)
CO2 SERPL-SCNC: 27 MMOL/L (ref 20–32)
CREAT SERPL-MCNC: 0.93 MG/DL (ref 0.66–1.25)
ERYTHROCYTE [DISTWIDTH] IN BLOOD BY AUTOMATED COUNT: 12.3 % (ref 10–15)
GFR SERPL CREATININE-BSD FRML MDRD: 85 ML/MIN/{1.73_M2}
GLUCOSE SERPL-MCNC: 105 MG/DL (ref 70–99)
HCT VFR BLD AUTO: 37.6 % (ref 40–53)
HGB BLD-MCNC: 12.7 G/DL (ref 13.3–17.7)
INR PPP: 1.11 (ref 0.86–1.14)
INR PPP: 1.14 (ref 0.86–1.14)
LMWH PPP CHRO-ACNC: 0.14 IU/ML
LMWH PPP CHRO-ACNC: 0.28 IU/ML
MAGNESIUM SERPL-MCNC: 2.2 MG/DL (ref 1.6–2.3)
MCH RBC QN AUTO: 32.1 PG (ref 26.5–33)
MCHC RBC AUTO-ENTMCNC: 33.8 G/DL (ref 31.5–36.5)
MCV RBC AUTO: 95 FL (ref 78–100)
PLATELET # BLD AUTO: 366 10E9/L (ref 150–450)
POTASSIUM SERPL-SCNC: 4.3 MMOL/L (ref 3.4–5.3)
PROT SERPL-MCNC: 7 G/DL (ref 6.8–8.8)
RBC # BLD AUTO: 3.96 10E12/L (ref 4.4–5.9)
SODIUM SERPL-SCNC: 136 MMOL/L (ref 133–144)
WBC # BLD AUTO: 9.2 10E9/L (ref 4–11)

## 2020-03-28 PROCEDURE — 99222 1ST HOSP IP/OBS MODERATE 55: CPT | Performed by: INTERNAL MEDICINE

## 2020-03-28 PROCEDURE — 25000128 H RX IP 250 OP 636: Performed by: HOSPITALIST

## 2020-03-28 PROCEDURE — 81270 JAK2 GENE: CPT | Performed by: HOSPITALIST

## 2020-03-28 PROCEDURE — 85300 ANTITHROMBIN III ACTIVITY: CPT | Performed by: NURSE PRACTITIONER

## 2020-03-28 PROCEDURE — 81219 CALR GENE COM VARIANTS: CPT | Performed by: INTERNAL MEDICINE

## 2020-03-28 PROCEDURE — 85610 PROTHROMBIN TIME: CPT | Performed by: HOSPITALIST

## 2020-03-28 PROCEDURE — 99233 SBSQ HOSP IP/OBS HIGH 50: CPT | Performed by: INTERNAL MEDICINE

## 2020-03-28 PROCEDURE — 85613 RUSSELL VIPER VENOM DILUTED: CPT | Performed by: NURSE PRACTITIONER

## 2020-03-28 PROCEDURE — 88185 FLOWCYTOMETRY/TC ADD-ON: CPT | Performed by: INTERNAL MEDICINE

## 2020-03-28 PROCEDURE — 85520 HEPARIN ASSAY: CPT | Performed by: INTERNAL MEDICINE

## 2020-03-28 PROCEDURE — 81240 F2 GENE: CPT | Performed by: NURSE PRACTITIONER

## 2020-03-28 PROCEDURE — 25800030 ZZH RX IP 258 OP 636: Performed by: HOSPITALIST

## 2020-03-28 PROCEDURE — 36415 COLL VENOUS BLD VENIPUNCTURE: CPT | Performed by: HOSPITALIST

## 2020-03-28 PROCEDURE — 85610 PROTHROMBIN TIME: CPT | Performed by: NURSE PRACTITIONER

## 2020-03-28 PROCEDURE — 96365 THER/PROPH/DIAG IV INF INIT: CPT | Mod: 59

## 2020-03-28 PROCEDURE — 12000000 ZZH R&B MED SURG/OB

## 2020-03-28 PROCEDURE — 88184 FLOWCYTOMETRY/ TC 1 MARKER: CPT | Performed by: INTERNAL MEDICINE

## 2020-03-28 PROCEDURE — 87040 BLOOD CULTURE FOR BACTERIA: CPT | Performed by: EMERGENCY MEDICINE

## 2020-03-28 PROCEDURE — 81403 MOPATH PROCEDURE LEVEL 4: CPT | Performed by: INTERNAL MEDICINE

## 2020-03-28 PROCEDURE — 25000128 H RX IP 250 OP 636: Performed by: INTERNAL MEDICINE

## 2020-03-28 PROCEDURE — 00000401 ZZHCL STATISTIC THROMBIN TIME NC: Performed by: NURSE PRACTITIONER

## 2020-03-28 PROCEDURE — 80053 COMPREHEN METABOLIC PANEL: CPT | Performed by: HOSPITALIST

## 2020-03-28 PROCEDURE — 36415 COLL VENOUS BLD VENIPUNCTURE: CPT | Performed by: INTERNAL MEDICINE

## 2020-03-28 PROCEDURE — 96375 TX/PRO/DX INJ NEW DRUG ADDON: CPT

## 2020-03-28 PROCEDURE — 25000128 H RX IP 250 OP 636: Performed by: NURSE PRACTITIONER

## 2020-03-28 PROCEDURE — 25000132 ZZH RX MED GY IP 250 OP 250 PS 637: Performed by: INTERNAL MEDICINE

## 2020-03-28 PROCEDURE — 85730 THROMBOPLASTIN TIME PARTIAL: CPT | Performed by: NURSE PRACTITIONER

## 2020-03-28 PROCEDURE — 81241 F5 GENE: CPT | Performed by: NURSE PRACTITIONER

## 2020-03-28 PROCEDURE — 83735 ASSAY OF MAGNESIUM: CPT | Performed by: HOSPITALIST

## 2020-03-28 PROCEDURE — 40001003 ZZHCL STATISTIC FLOW INT 2-8 ABY TC 88187: Performed by: INTERNAL MEDICINE

## 2020-03-28 PROCEDURE — 25800030 ZZH RX IP 258 OP 636: Performed by: NURSE PRACTITIONER

## 2020-03-28 PROCEDURE — 81403 MOPATH PROCEDURE LEVEL 4: CPT | Performed by: HOSPITALIST

## 2020-03-28 PROCEDURE — 85027 COMPLETE CBC AUTOMATED: CPT | Performed by: HOSPITALIST

## 2020-03-28 PROCEDURE — 85306 CLOT INHIBIT PROT S FREE: CPT | Performed by: NURSE PRACTITIONER

## 2020-03-28 PROCEDURE — 25000132 ZZH RX MED GY IP 250 OP 250 PS 637: Performed by: HOSPITALIST

## 2020-03-28 PROCEDURE — 85303 CLOT INHIBIT PROT C ACTIVITY: CPT | Performed by: NURSE PRACTITIONER

## 2020-03-28 RX ORDER — HEPARIN SODIUM 10000 [USP'U]/100ML
1450 INJECTION, SOLUTION INTRAVENOUS CONTINUOUS
Status: DISCONTINUED | OUTPATIENT
Start: 2020-03-28 | End: 2020-03-28

## 2020-03-28 RX ORDER — LIDOCAINE 40 MG/G
CREAM TOPICAL
Status: DISCONTINUED | OUTPATIENT
Start: 2020-03-28 | End: 2020-03-30 | Stop reason: HOSPADM

## 2020-03-28 RX ORDER — POTASSIUM CHLORIDE 29.8 MG/ML
20 INJECTION INTRAVENOUS
Status: DISCONTINUED | OUTPATIENT
Start: 2020-03-28 | End: 2020-03-30 | Stop reason: HOSPADM

## 2020-03-28 RX ORDER — SODIUM CHLORIDE 9 MG/ML
INJECTION, SOLUTION INTRAVENOUS CONTINUOUS
Status: DISCONTINUED | OUTPATIENT
Start: 2020-03-28 | End: 2020-03-29

## 2020-03-28 RX ORDER — LEVOFLOXACIN 5 MG/ML
500 INJECTION, SOLUTION INTRAVENOUS EVERY 24 HOURS
Status: DISCONTINUED | OUTPATIENT
Start: 2020-03-29 | End: 2020-03-29

## 2020-03-28 RX ORDER — POTASSIUM CHLORIDE 1500 MG/1
20-40 TABLET, EXTENDED RELEASE ORAL
Status: DISCONTINUED | OUTPATIENT
Start: 2020-03-28 | End: 2020-03-30 | Stop reason: HOSPADM

## 2020-03-28 RX ORDER — BISACODYL 10 MG
10 SUPPOSITORY, RECTAL RECTAL DAILY PRN
Status: DISCONTINUED | OUTPATIENT
Start: 2020-03-28 | End: 2020-03-30 | Stop reason: HOSPADM

## 2020-03-28 RX ORDER — HYDROCODONE BITARTRATE AND ACETAMINOPHEN 5; 325 MG/1; MG/1
1-2 TABLET ORAL EVERY 4 HOURS PRN
Status: DISCONTINUED | OUTPATIENT
Start: 2020-03-28 | End: 2020-03-30 | Stop reason: HOSPADM

## 2020-03-28 RX ORDER — ACETAMINOPHEN 650 MG/1
650 SUPPOSITORY RECTAL EVERY 4 HOURS PRN
Status: DISCONTINUED | OUTPATIENT
Start: 2020-03-28 | End: 2020-03-28

## 2020-03-28 RX ORDER — AMOXICILLIN 250 MG
1 CAPSULE ORAL 2 TIMES DAILY PRN
Status: DISCONTINUED | OUTPATIENT
Start: 2020-03-28 | End: 2020-03-30 | Stop reason: HOSPADM

## 2020-03-28 RX ORDER — ACETAMINOPHEN 325 MG/1
650 TABLET ORAL EVERY 4 HOURS PRN
Status: DISCONTINUED | OUTPATIENT
Start: 2020-03-28 | End: 2020-03-30 | Stop reason: HOSPADM

## 2020-03-28 RX ORDER — POTASSIUM CL/LIDO/0.9 % NACL 10MEQ/0.1L
10 INTRAVENOUS SOLUTION, PIGGYBACK (ML) INTRAVENOUS
Status: DISCONTINUED | OUTPATIENT
Start: 2020-03-28 | End: 2020-03-30 | Stop reason: HOSPADM

## 2020-03-28 RX ORDER — ASPIRIN 81 MG/1
81 TABLET ORAL DAILY
Status: DISCONTINUED | OUTPATIENT
Start: 2020-03-28 | End: 2020-03-30

## 2020-03-28 RX ORDER — HYDROMORPHONE HYDROCHLORIDE 1 MG/ML
0.2 INJECTION, SOLUTION INTRAMUSCULAR; INTRAVENOUS; SUBCUTANEOUS
Status: DISCONTINUED | OUTPATIENT
Start: 2020-03-28 | End: 2020-03-30 | Stop reason: HOSPADM

## 2020-03-28 RX ORDER — NALOXONE HYDROCHLORIDE 0.4 MG/ML
.1-.4 INJECTION, SOLUTION INTRAMUSCULAR; INTRAVENOUS; SUBCUTANEOUS
Status: DISCONTINUED | OUTPATIENT
Start: 2020-03-28 | End: 2020-03-30 | Stop reason: HOSPADM

## 2020-03-28 RX ORDER — POTASSIUM CHLORIDE 1.5 G/1.58G
20-40 POWDER, FOR SOLUTION ORAL
Status: DISCONTINUED | OUTPATIENT
Start: 2020-03-28 | End: 2020-03-30 | Stop reason: HOSPADM

## 2020-03-28 RX ORDER — ONDANSETRON 4 MG/1
4 TABLET, ORALLY DISINTEGRATING ORAL EVERY 6 HOURS PRN
Status: DISCONTINUED | OUTPATIENT
Start: 2020-03-28 | End: 2020-03-30 | Stop reason: HOSPADM

## 2020-03-28 RX ORDER — ONDANSETRON 2 MG/ML
4 INJECTION INTRAMUSCULAR; INTRAVENOUS EVERY 6 HOURS PRN
Status: DISCONTINUED | OUTPATIENT
Start: 2020-03-28 | End: 2020-03-30 | Stop reason: HOSPADM

## 2020-03-28 RX ORDER — POTASSIUM CHLORIDE 7.45 MG/ML
10 INJECTION INTRAVENOUS
Status: DISCONTINUED | OUTPATIENT
Start: 2020-03-28 | End: 2020-03-30 | Stop reason: HOSPADM

## 2020-03-28 RX ORDER — HEPARIN SODIUM 10000 [USP'U]/100ML
1750 INJECTION, SOLUTION INTRAVENOUS CONTINUOUS
Status: DISCONTINUED | OUTPATIENT
Start: 2020-03-28 | End: 2020-03-29

## 2020-03-28 RX ORDER — POLYETHYLENE GLYCOL 3350 17 G/17G
17 POWDER, FOR SOLUTION ORAL DAILY PRN
Status: DISCONTINUED | OUTPATIENT
Start: 2020-03-28 | End: 2020-03-30 | Stop reason: HOSPADM

## 2020-03-28 RX ORDER — AMOXICILLIN 250 MG
2 CAPSULE ORAL 2 TIMES DAILY PRN
Status: DISCONTINUED | OUTPATIENT
Start: 2020-03-28 | End: 2020-03-30 | Stop reason: HOSPADM

## 2020-03-28 RX ORDER — MAGNESIUM SULFATE HEPTAHYDRATE 40 MG/ML
4 INJECTION, SOLUTION INTRAVENOUS EVERY 4 HOURS PRN
Status: DISCONTINUED | OUTPATIENT
Start: 2020-03-28 | End: 2020-03-30 | Stop reason: HOSPADM

## 2020-03-28 RX ADMIN — SODIUM CHLORIDE: 9 INJECTION, SOLUTION INTRAVENOUS at 01:09

## 2020-03-28 RX ADMIN — SODIUM CHLORIDE 1000 ML: 9 INJECTION, SOLUTION INTRAVENOUS at 00:04

## 2020-03-28 RX ADMIN — Medication 2500 UNITS: at 10:55

## 2020-03-28 RX ADMIN — Medication 2500 UNITS: at 20:17

## 2020-03-28 RX ADMIN — HYDROMORPHONE HYDROCHLORIDE 0.5 MG: 1 INJECTION, SOLUTION INTRAMUSCULAR; INTRAVENOUS; SUBCUTANEOUS at 00:13

## 2020-03-28 RX ADMIN — HEPARIN SODIUM 1200 UNITS/HR: 10000 INJECTION, SOLUTION INTRAVENOUS at 04:01

## 2020-03-28 RX ADMIN — LEVOFLOXACIN 750 MG: 5 INJECTION, SOLUTION INTRAVENOUS at 00:29

## 2020-03-28 RX ADMIN — ACETAMINOPHEN 650 MG: 325 TABLET, FILM COATED ORAL at 02:12

## 2020-03-28 RX ADMIN — SODIUM CHLORIDE: 9 INJECTION, SOLUTION INTRAVENOUS at 12:58

## 2020-03-28 RX ADMIN — HEPARIN SODIUM 1450 UNITS/HR: 10000 INJECTION, SOLUTION INTRAVENOUS at 17:33

## 2020-03-28 RX ADMIN — ASPIRIN 81 MG: 81 TABLET, DELAYED RELEASE ORAL at 12:59

## 2020-03-28 RX ADMIN — LEVOFLOXACIN 500 MG: 5 INJECTION, SOLUTION INTRAVENOUS at 23:49

## 2020-03-28 RX ADMIN — Medication 5000 UNITS: at 04:03

## 2020-03-28 ASSESSMENT — ACTIVITIES OF DAILY LIVING (ADL)
ADLS_ACUITY_SCORE: 11
ADLS_ACUITY_SCORE: 13
ADLS_ACUITY_SCORE: 13
ADLS_ACUITY_SCORE: 11
ADLS_ACUITY_SCORE: 13

## 2020-03-28 ASSESSMENT — MIFFLIN-ST. JEOR: SCORE: 1764.68

## 2020-03-28 NOTE — ED NOTES
[facility]  ED Nurse Handoff Report    ED Chief complaint: Fever      ED Diagnosis:   Final diagnoses:   Transaminitis   Urinary tract infection       Code Status: Full Code    Allergies:   Allergies   Allergen Reactions     Dilaudid [Hydromorphone]      Agitation, did not help with pain     Morphine Anxiety       Patient Story: Pt has had R upper abd pain x 11 days that has been accompanied with intermittent fever.  Focused Assessment:  Alert and oriented.  VSS.  Up independently.  Pt has had abdominal pain in his RUQ.  Pt has had some urinary frequency and burning that he stated seemed to subside today.  Pt was started on rocephin however had a hive appear on his jaw;  abx was stopped roughly 15-20mL were administered..    Treatments and/or interventions provided: 1L bolus  Patient's response to treatments and/or interventions:     To be done/followed up on inpatient unit:  See epic    Does this patient have any cognitive concerns?:    Activity level - Baseline/Home:  Independent  Activity Level - Current:   Independent    Patient's Preferred language: English   Needed?: No    Isolation: None  Infection: Not Applicable  Bariatric?: No    Vital Signs:   Vitals:    03/27/20 2029 03/27/20 2141 03/27/20 2246   Pulse:  97 98   Resp: 16 18    Temp: 98.5  F (36.9  C)     TempSrc: Oral     SpO2: 95% 98% 98%       Cardiac Rhythm:     Was the PSS-3 completed:   Yes  What interventions are required if any?               Family Comments: na  OBS brochure/video discussed/provided to patient/family: N/A              Name of person given brochure if not patient:               Relationship to patient:     For the majority of the shift this patient's behavior was Green.   Behavioral interventions performed were .    ED NURSE PHONE NUMBER: [phone]

## 2020-03-28 NOTE — PROGRESS NOTES
SUBJECTIVE:  Billie is a 64 year old male who presents to urgent care with 10 days of fever.  His temperature has consistently been around 101.5-100.  Tylenol does decrease the temperature but not fully controlled.  Originally started with uncontrollable chills for about 2 days.  He had no appetite for the first 4 days.  Since then he has had a little bit more appetite but has had continuous stomach tightness/crampy pain.  The pain is worse after eating.  He has a history of cholelithiasis and status post cholecystectomy.  He denies any changes in bowel movements, diarrhea or constipation.  No blood or dark tarry stool.  He has minimal nausea and no vomiting.  He denies any cough but states he thinks he would cough if he started moving around or exercising.  He initially reports wheezing but states he feels like he would wheeze if he would exercise or move around more.  He has no history of asthma or wheezing.  He has some intermittent chest tightness and shortness of breath but denies respiratory distress.  He denies palpitations or chest pain.  He has been eating a bit less.  He has been trying to hydrate but feels he was dehydrated initially and had orange Pee.  This has since returned to normal.  He also had some dysuria for 1 day but this resolved as well.  Patient was screened by [facility]    Chief Complaint   Patient presents with     Fever     ROS: as stated in HPI, otherwise negative    Past Medical History:   Diagnosis Date     Arthritis      BCC (basal cell carcinoma of skin) 11/2017    left lower lid     Cholelithiasis 10/8/2013     Heart disease      Hypertension 08/2018     Pelvic fracture (H) 11/2014    occured while horseback riding     Renal disease     kidney stone      Social History     Socioeconomic History     Marital status:      Spouse name: Not on file     Number of children: 2     Years of education: Not on file     Highest education level: Not on file   Occupational History      Occupation: remodels homes   Social Needs     Financial resource strain: Not on file     Food insecurity     Worry: Not on file     Inability: Not on file     Transportation needs     Medical: Not on file     Non-medical: Not on file   Tobacco Use     Smoking status: Never Smoker     Smokeless tobacco: Never Used   Substance and Sexual Activity     Alcohol use: Yes     Alcohol/week: 0.0 standard drinks     Comment: occasionally     Drug use: No     Sexual activity: Not Currently     Partners: Female   Lifestyle     Physical activity     Days per week: Not on file     Minutes per session: Not on file     Stress: Not on file   Relationships     Social connections     Talks on phone: Not on file     Gets together: Not on file     Attends Voodoo service: Not on file     Active member of club or organization: Not on file     Attends meetings of clubs or organizations: Not on file     Relationship status: Not on file     Intimate partner violence     Fear of current or ex partner: Not on file     Emotionally abused: Not on file     Physically abused: Not on file     Forced sexual activity: Not on file   Other Topics Concern     Parent/sibling w/ CABG, MI or angioplasty before 65F 55M? No   Social History Narrative    , 2 children    Self Employed - semi-retired          Current Outpatient Medications:      amLODIPine (NORVASC) 5 MG tablet, Take 1 tablet (5 mg) by mouth daily, Disp: 90 tablet, Rfl: 3     aspirin 81 MG tablet, Take 1 tablet (81 mg) by mouth daily, Disp: 90 tablet, Rfl: 3     atorvastatin (LIPITOR) 40 MG tablet, Take 1 tablet (40 mg) by mouth daily, Disp: 90 tablet, Rfl: 3     OBJECTIVE:  BP (!) 143/92 (BP Location: Right arm, Patient Position: Sitting, Cuff Size: Adult Regular)   Pulse 94   Temp 98.3  F (36.8  C)   SpO2 99%    GENERAL APPEARANCE: Appears ill, seems to be in pain but in no acute distress  HEENT: HEAD: ATNC  EYES: Conjunctiva clear, EOMI  NECK: Supple, non tender, no cervical  adenopathy  LUNGS: No respiratory distress but does breathe deeply after moving around, clear lung sounds in all fields, no wheezes, rales, crackles or rhonchi   CV: RRR, no murmurs, rubs or gallops, no cyanosis  Abdomen: Previous surgical scar seen.  Soft, nondistended, epigastric tenderness with mild guarding.  No rebound.  No masses palpated  NUERO: AOx3, normal mentation  PSYCH: normal mood and affect    No results found for any visits on 03/27/20.     ASSESSMENT/PLAN:  Billie was seen today for fever.    Diagnoses and all orders for this visit:    Fever, unspecified fever cause    Abdominal pain, epigastric        Given patient's fever for 11 days and acutely tender abdomen I would like to imaging unfortunately we do not have the capabilities to do so and so he will be sent to the ER for further evaluation and management.  Patient agrees to this.  There is a chance that this is COVID-19.  However I think it is more likely patient has an intra-abdominal process going on and the shortness of breath is more due to dehydration.  His lung exam was within normal limits and oxygen was 99%.  He was afebrile during this office visit    Bon Yañez PA-C      Addendum: In compliance with COVID-19 possibility precautions patient was seen by 1 provider wearing Gown, gloves, mask with face shield and Effie.  This was doff and placed in a separate bag while in the room with patient 6 feet away.  Second pair of gloves and mask were removed outside of room

## 2020-03-28 NOTE — PROGRESS NOTES
RECEIVING UNIT ED HANDOFF REVIEW    ED Nurse Handoff Report was reviewed by: Erin Bess RN on March 28, 2020 at 12:07 AM

## 2020-03-28 NOTE — PROVIDER NOTIFICATION
Prescriber Notification Note    The pharmacist has communicated with this patient's provider regarding a concern or therapy recommendation.    Notified Person: Broccard  Date/Time of Notification: 3/28/20 7649  Interaction: phone  Concern/Recommendation:discussed heparin xa goal for thrombus     Comments/Additional Details:increase heparin xa goal to 0.3-0.7

## 2020-03-28 NOTE — PHARMACY-ADMISSION MEDICATION HISTORY
Pharmacy Medication History  Admission medication history interview status for the 3/27/2020  admission is complete. See EPIC admission navigator for prior to admission medications     Medication history sources: Patient  Medication history source reliability: Good  Adherence assessment: Good    Significant changes made to the medication list:  None       Additional medication history information:   Patient seem to be aware of his medications. He denies talking any other medications including OTC and herbal.      Medication reconciliation completed by provider prior to medication history? No    Time spent in this activity: 5 minutes       Prior to Admission medications    Medication Sig Last Dose Taking? Auth Provider   amLODIPine (NORVASC) 5 MG tablet Take 1 tablet (5 mg) by mouth daily 3/27/2020 at pm Yes Jacob Graham MD   aspirin 81 MG tablet Take 1 tablet (81 mg) by mouth daily 3/27/2020 at am Yes Jacob Graham MD   atorvastatin (LIPITOR) 40 MG tablet Take 1 tablet (40 mg) by mouth daily 3/27/2020 at pm Yes Jacob Graham MD Elham Said   Student Pharmacist

## 2020-03-28 NOTE — H&P
[facility]    History and Physical  Hospitalist       Date of Admission:  3/27/2020  Date of Service (when I saw the patient): 03/27/20    Assessment & Plan   Billie Aden is a 64 year old male who presents with fever.  Admitted for further evaluation and treatment.    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  In addition there is nonocclusive thrombus at the portal splenic confluence with occlusive thrombus of the inferior right portal venous system, colonic diverticula but no CT evidence for diverticulitis, small nonobstructing stone right kidney, cholecystectomy, per report.  Alkaline phosphatase is elevated at 159, ALT is 152, AST is 102, lactic acid is 1.5, lipase level is 382.  Patient reporting fever times twisting right-sided abdominal pain with associated nausea and fever up to 101-102.  Patient did have a virtual visit and was instructed to come to the emergency department for further evaluation.  Patient states abdominal pain is improving at times but still present and worsening with fever continued, thus presented to the emergency department for further evaluation.  Patient does state that the pain is worse after eating.  Patient denies headache, eye pain, ear pain, sore throat, cough, blood in the stool or urine, chest pain, shortness of breath, lower extremity edema.  Patient does state he has been taking acetaminophen for his fever.  -Hypercoagulable labs.  -Vascular surgery consulted.  -Vascular medicine consulted.  -Close hemodynamic monitoring.  -Gentle IV fluids.  -Clear liquid diet.    Suspected urinary tract infection: Patient with urinalysis showing large leukocyte esterase. Initially  given cephalosporin however, patient with reaction and swelling to left neck, therefore stopped.   -Urine culture pending.  -Levaquin.    Cardiac, hyperlipidemia, hypertension: Patient maintained on aspirin, amlodipine, atorvastatin in the outpatient setting.  -Continue prior to admission amlodipine, atorvastatin and aspirin when verified by pharmacy.    DVT Prophylaxis: Heparin drip  Code Status: Full Code    Disposition: Inpatient.    Dr. Prosper Núñez D.O.  [facility] Hospitalist  Pager [phone]    Primary Care Physician   Jacob Graham MD    Chief Complaint   Abdominal pain    History is obtained from the patient and medical records.     History of Present Illness   Billie Aden is a 64 year old male who presents with fever.  Admitted for further evaluation and treatment. CT completed in emergency department showed nonocclusive thrombus involving the inferior mesenteric vein including sigmoid veins, middle and left colic veins with stranding within the left side of the mesentery extending into the pelvis.  In addition there is nonocclusive thrombus at the portal splenic confluence with occlusive thrombus of the inferior right portal venous system, colonic diverticula but no CT evidence for diverticulitis, small nonobstructing stone right kidney, cholecystectomy, per report.  Alkaline phosphatase is elevated at 159, ALT is 152, AST is 102, lactic acid is 1.5, lipase level is 382.  Patient reporting fever times twisting right-sided abdominal pain with associated nausea and fever up to 101-102.  Patient did have a virtual visit and was instructed to come to the emergency department for further evaluation.  Patient states abdominal pain is improving at times but still present and worsening with fever continued, thus presented to the emergency department for further evaluation.  Patient does state that the pain is worse after eating.  Patient denies headache, eye pain, ear pain, sore  throat, cough, blood in the stool or urine, chest pain, shortness of breath, lower extremity edema.  Patient does state he has been taking acetaminophen for his fever.    Past Medical History    I have reviewed this patient's medical history and updated it with pertinent information if needed.   Past Medical History:   Diagnosis Date     Arthritis      BCC (basal cell carcinoma of skin) 11/2017    left lower lid     Cholelithiasis 10/8/2013     Heart disease      Hypertension 08/2018     Pelvic fracture (H) 11/2014    occured while horseback riding     Renal disease     kidney stone       Past Surgical History   I have reviewed this patient's surgical history and updated it with pertinent information if needed.  Past Surgical History:   Procedure Laterality Date     CHOLECYSTECTOMY       COLONOSCOPY       HC CLOSED TX SHOULDER DISLOC W MANIP NO ANESTH       LAPAROSCOPIC CHOLECYSTECTOMY N/A 12/23/2014    Procedure: LAPAROSCOPIC CHOLECYSTECTOMY;  Surgeon: Avelino Bryson MD;  Location:  [facility]     REPAIR MOHS Left 1/25/2018    Procedure: REPAIR MOHS;  Left Lower Eyelid Mohs Reconstruction;  Surgeon: Claudia Melvin MD;  Location:  OR       Prior to Admission Medications   Prior to Admission Medications   Prescriptions Last Dose Informant Patient Reported? Taking?   amLODIPine (NORVASC) 5 MG tablet   No No   Sig: Take 1 tablet (5 mg) by mouth daily   aspirin 81 MG tablet   Yes No   Sig: Take 1 tablet (81 mg) by mouth daily   atorvastatin (LIPITOR) 40 MG tablet   No No   Sig: Take 1 tablet (40 mg) by mouth daily      Facility-Administered Medications: None     Allergies   Allergies   Allergen Reactions     Dilaudid [Hydromorphone]      Agitation, did not help with pain     Morphine Anxiety       Social History   I have reviewed this patient's social history and updated it with pertinent information if needed. Billie Aden  reports that he has never smoked. He has never used smokeless tobacco. He reports current  alcohol use. He reports that he does not use drugs.    Family History   I have reviewed this patient's family history and updated it with pertinent information if needed.   Family History   Problem Relation Age of Onset     Cancer - colorectal Mother 79     Cardiovascular Father 70        Had a carotid endarterectomy at age 70 years.  Tobacco user.     Heart Surgery Brother 66        Alive. Had CABG at age 66 years. Smoker     Diabetes Type 2  Brother      Melanoma Brother        Review of Systems   The 10 point Review of Systems is negative other than noted in the HPI or here.     Physical Exam   Temp: 98.5  F (36.9  C) Temp src: Oral   Pulse: 98 Heart Rate: 98 Resp: 18 SpO2: 98 % O2 Device: None (Room air)    Vital Signs with Ranges  Temp:  [98.3  F (36.8  C)-98.5  F (36.9  C)] 98.3  F (36.8  C)  Pulse:  [94-98] 98  Heart Rate:  [] 98  Resp:  [16-18] 18  BP: (143)/(92) 143/92  SpO2:  [95 %-99 %] 98 %  0 lbs 0 oz    GENERAL: Alert and oriented. NAD. Conversational, appropriate.   HEENT: Normocephalic. EOMI. No icterus or injection. Nares normal. Swelling to left neck mandibular angle.   LUNGS: Clear to auscultation. No dyspnea at rest.   HEART: Regular rate. Extremities perfused.   ABDOMEN: Soft, tender, and minimally distended.  No peritoneal signs. Positive bowel sounds.   EXTREMITIES: No LE edema noted.   NEUROLOGIC: Moves extremities x4 on command. No acute focal neurologic abnormalities noted.     Data   Data reviewed today:  I personally reviewed both laboratory and imaging data.   Recent Labs   Lab 03/27/20  2105   WBC 9.4   HGB 13.1*   MCV 96         POTASSIUM 3.9   CHLORIDE 104   CO2 30   BUN 16   CR 0.96   ANIONGAP 3   ARMANI 8.9   *   ALBUMIN 2.8*   PROTTOTAL 7.8   BILITOTAL 1.0   ALKPHOS 159*   *   *   LIPASE 382       Recent Results (from the past 24 hour(s))   CT Abdomen Pelvis w Contrast    Narrative    EXAM: CT ABDOMEN PELVIS W CONTRAST  LOCATION: [facility]  Services  DATE/TIME: 3/27/2020 9:50 PM    INDICATION: Abd infection (incl peritonitis) pain.  COMPARISON: None.  TECHNIQUE: CT scan of the abdomen and pelvis was performed following injection of IV contrast. Multiplanar reformats were obtained. Dose reduction techniques were used.  CONTRAST: 106 mL Isovue-370    FINDINGS:   LOWER CHEST: Normal.    HEPATOBILIARY: Fatty liver. Occlusive thrombus within the right portal vein inferior segments with nonocclusive thrombus within the splenic vein near the portal splenic confluence extending into the confluence.    PANCREAS: Normal.    SPLEEN: Normal.    ADRENAL GLANDS: Normal.    KIDNEYS/BLADDER: Small nonobstructing stone right kidney. Simple cysts in the kidneys.    BOWEL: Occlusive thrombus within the inferior mesenteric vein with mesenteric congestion within the left side of the abdomen extending into the pelvis into the sigmoid mesocolon. Occlusive thrombus extends also to involve the medial aspect of the   descending colon where there is some slight inflammatory change.    LYMPH NODES: Normal.    VASCULATURE: Unremarkable.    PELVIC ORGANS: Normal.    MUSCULOSKELETAL: Normal.      Impression    IMPRESSION:   1.  Occlusive thrombus involving the inferior mesenteric vein including sigmoid veins, middle and left colic veins with stranding within the left side of the mesentery extending into the pelvis. In addition there is nonocclusive thrombus at the portal   splenic confluence with occlusive thrombosis of the inferior right portal venous system.    2.  Colonic diverticula but no CT evidence for diverticulitis.    3.  Small nonobstructing stone right kidney.    4.  Cholecystectomy.    5.  Results discussed with Dr. Dowling

## 2020-03-28 NOTE — CONSULTS
[facility]    Hematology/Oncology Consult Note      Date of Admission:  3/27/2020  Date of Consult:  03/28/20  Reason for Consult: mesenteric vein thrombosis      ASSESSMENT/PLAN:  Billie Aden is a 64 year old male with:    1) Inferior mesenteric, splenic, portal venous thrombosis: Unclear etiology.  There is no evidence of malignancy on the CT scan.  He has fatty liver, but no imaging evidence of cirrhosis.  He denies personal or family history of blood clots.  He does have a UTI, so infection and inflammation is possible cause.      -vascular surgery saw patient; no surgical intervention is planned  -hypercoagulable work-up already sent from the ED, including factor II, factor V Leiden, protein C, protein S, antithrombin III, lupus anticoagulant.  Will also check PNH by flow, as well as myeloproliferative panel, including JAK2.  -he is currently on heparin drip  -we discussed different options for oral anticoagulation when he is ready for discharge.  We discussed warfarin versus a DOAC, including side effects, the bleeding risk, different dosings, need for INR checks with warfarin, potential interactions with regards to diet and medications.  He would prefer DOAC, but need to check on cost.  Pharmacy consult to check on cost of DOAC's.  -age appropriate cancer screening as outpatient.  He did have a colonoscopy in October 2018.  -anticoagulation for at least 3-6 months, perhaps longer depending on thrombophilia work-up.  -we will follow-up with the patient outpatient in ~6-8 weeks to review results of thrombophilia work-up and determine duration of anticoagulation.  -we will sign off at this time.  Please call with any questions.        HISTORY OF PRESENT ILLNESS: Billie Aden is a 64 year old male who presents with abdominal pain and fever.  He says that he has had fever, abdominal pain and bloating for about 10 years.  He has no cough or shortness of breath or lower extremity edema.   CT scan done in the ED showed occlusive thrombus involving the inferior mesenteric vein including sigmoid veins, middle and left colic veins.  There is also non-occlusive thrombus at the portal splenic confluence with occlusive thrombosis of the inferior right portal venous system.  He was also found to UTI and started on antibiotics.    He denies family history of blood clots.  He does not smoke.  He denies recent travel.      Currently, Billie says that he does feel better, but still has some abdomina bloating.  He is on clear liquid diet so far which he tolerated well and feels hungry for more.      REVIEW OF SYSTEMS:   14 point ROS was reviewed and is negative other than as noted above in HPI.       MEDICATIONS:  Current Facility-Administered Medications   Medication     acetaminophen (TYLENOL) Suppository 325 mg     acetaminophen (TYLENOL) tablet 650 mg     aspirin EC tablet 81 mg     bisacodyl (DULCOLAX) Suppository 10 mg     heparin 25,000 units in 0.45% NaCl 250 mL ANTICOAGULANT  infusion     HYDROcodone-acetaminophen (NORCO) 5-325 MG per tablet 1-2 tablet     HYDROmorphone (PF) (DILAUDID) injection 0.2 mg     [START ON 3/29/2020] levofloxacin (LEVAQUIN) infusion 500 mg     lidocaine (LMX4) cream     lidocaine 1 % 0.1-1 mL     magnesium sulfate 4 g in 100 mL sterile water (premade)     melatonin tablet 1 mg     naloxone (NARCAN) injection 0.1-0.4 mg     ondansetron (ZOFRAN-ODT) ODT tab 4 mg    Or     ondansetron (ZOFRAN) injection 4 mg     Patient is already receiving anticoagulation with heparin, enoxaparin (LOVENOX), warfarin (COUMADIN)  or other anticoagulant medication     polyethylene glycol (MIRALAX) Packet 17 g     potassium chloride (KLOR-CON) Packet 20-40 mEq     potassium chloride 10 mEq in 100 mL intermittent infusion with 10 mg lidocaine     potassium chloride 10 mEq in 100 mL sterile water intermittent infusion (premix)     potassium chloride 20 mEq in 50 mL intermittent infusion     potassium  chloride ER (KLOR-CON M) CR tablet 20-40 mEq     senna-docusate (SENOKOT-S/PERICOLACE) 8.6-50 MG per tablet 1 tablet    Or     senna-docusate (SENOKOT-S/PERICOLACE) 8.6-50 MG per tablet 2 tablet     sodium chloride (PF) 0.9% PF flush 3 mL     sodium chloride (PF) 0.9% PF flush 3 mL     sodium chloride 0.9% infusion         ALLERGIES:  Allergies   Allergen Reactions     Dilaudid [Hydromorphone]      Agitation, did not help with pain     Morphine Anxiety         PAST MEDICAL HISTORY:  Past Medical History:   Diagnosis Date     Arthritis      BCC (basal cell carcinoma of skin) 11/2017    left lower lid     Cholelithiasis 10/8/2013     Heart disease      Hypertension 08/2018     Nephrolithiasis      Pelvic fracture (H) 11/2014    occured while horseback riding         PAST SURGICAL HISTORY:  Past Surgical History:   Procedure Laterality Date     COLONOSCOPY       HC CLOSED TX SHOULDER DISLOC W MANIP NO ANESTH       LAPAROSCOPIC CHOLECYSTECTOMY N/A 12/23/2014    Surgeon: Avelino Bryson MD;  Location:  [facility]     REPAIR MOHS Left 1/25/2018    Procedure: REPAIR MOHS;  Left Lower Eyelid Mohs Reconstruction;  Surgeon: Claudia Melvin MD;  Location:  OR         SOCIAL HISTORY:  Social History     Socioeconomic History     Marital status:      Spouse name: Not on file     Number of children: 2     Years of education: Not on file     Highest education level: Not on file   Occupational History     Occupation: remodels [employer]   Social Needs     Financial resource strain: Not on file     Food insecurity     Worry: Not on file     Inability: Not on file     Transportation needs     Medical: Not on file     Non-medical: Not on file   Tobacco Use     Smoking status: Never Smoker     Smokeless tobacco: Never Used   Substance and Sexual Activity     Alcohol use: Yes     Alcohol/week: 0.0 standard drinks     Comment: occasionally     Drug use: No     Sexual activity: Not Currently     Partners: Female   Lifestyle      "Physical activity     Days per week: Not on file     Minutes per session: Not on file     Stress: Not on file   Relationships     Social connections     Talks on phone: Not on file     Gets together: Not on file     Attends Mandaeism service: Not on file     Active member of club or organization: Not on file     Attends meetings of clubs or organizations: Not on file     Relationship status: Not on file     Intimate partner violence     Fear of current or ex partner: Not on file     Emotionally abused: Not on file     Physically abused: Not on file     Forced sexual activity: Not on file   Other Topics Concern     Parent/sibling w/ CABG, MI or angioplasty before 65F 55M? No   Social History Narrative    , 2 children    Self Employed - semi-retired         FAMILY HISTORY:  Family History   Problem Relation Age of Onset     Cancer - colorectal Mother 79     Cardiovascular Father 70        Had a carotid endarterectomy at age 70 years.  Tobacco user.     Heart Surgery Brother 66        Alive. Had CABG at age 66 years. Smoker     Diabetes Type 2  Brother      Melanoma Brother          PHYSICAL EXAM:  Vital signs:  Temp: 97.8  F (36.6  C) Temp src: Oral BP: 133/84 Pulse: 101 Heart Rate: 84 Resp: 17 SpO2: 94 % O2 Device: None (Room air)   Height: 175.3 cm (5' 9\") Weight: 98.9 kg (218 lb 1.6 oz)  Estimated body mass index is 32.21 kg/m  as calculated from the following:    Height as of this encounter: 1.753 m (5' 9\").    Weight as of this encounter: 98.9 kg (218 lb 1.6 oz).    GENERAL/CONSTITUTIONAL: No acute distress.  EYES: No scleral icterus.  RESPIRATORY: Clear to auscultation bilaterally. No crackles or wheezing.   CARDIOVASCULAR: Regular rate and rhythm without murmurs, gallops, or rubs.  GASTROINTESTINAL: No tenderness. The patient has normal bowel sounds. No guarding.  MUSCULOSKELETAL: Warm and well-perfused.  NEUROLOGIC: Alert, oriented, answers questions appropriately.  INTEGUMENTARY: No " jaundice.      LABS:  CBC RESULTS:   Recent Labs   Lab Test 03/28/20  0623   WBC 9.2   RBC 3.96*   HGB 12.7*   HCT 37.6*   MCV 95   MCH 32.1   MCHC 33.8   RDW 12.3          Recent Labs   Lab Test 03/28/20  0623 03/27/20  2105    137   POTASSIUM 4.3 3.9   CHLORIDE 105 104   CO2 27 30   ANIONGAP 4 3   * 107*   BUN 12 16   CR 0.93 0.96   ARMANI 8.4* 8.9       IMAGING:  CT abdomen/pelvis 3/27/20:  1.  Occlusive thrombus involving the inferior mesenteric vein including sigmoid veins, middle and left colic veins with stranding within the left side of the mesentery extending into the pelvis. In addition there is nonocclusive thrombus at the portal   splenic confluence with occlusive thrombosis of the inferior right portal venous system.     2.  Colonic diverticula but no CT evidence for diverticulitis.     3.  Small nonobstructing stone right kidney.     4.  Cholecystectomy.    CXR 3/27/20:  Clear lungs. No pneumothorax or pleural effusion. Normal heart size and mediastinal contours. Small ossification projecting adjacent to the right humeral neck medially is probably a loose body in the glenohumeral recess. There are some   degenerative changes of the right shoulder.      Thank you for the opportunity to participate in this patient's care.  Please call with any questions.    Luann Wagner MD  Hematology/Oncology  [facility]

## 2020-03-28 NOTE — PLAN OF CARE
Primary Diagnosis: Thrombus, abdominal pain  Orientation: AxO x4  Aggression Stop Light: Green  Mobility: SBA  Pain Management: Pain medication available. None given this shift.  Diet: clear liquid  Bowel/Bladder: Using bedside urinal. Continent.  Abnormal Lab/Assessments:   Drain/Device/Wound: IVF infusing, R PIV  Consults: Vascular med  D/C Day/Goals/Place: Pending progress and consults    Shift Note: AxO x4. VSS, max temp 100.8, brought down to 98.8 after Tylenol. Tolerating diet. Heparin infusing at 12ml/hr, recheck at 10am. Straining urine. Discharge pending.

## 2020-03-28 NOTE — ED PROVIDER NOTES
"  History   Chief Complaint:  Fever    HPI   Billie Aden is a 64 year old male with a history of HTN and CKD stage 3 who presents to the emergency department for evaluation of a fever.   He notes on 3/17 he developed a fever of 101.8 and \"twisting\" right sided abdominal pain with associated nausea. He denies cough, shortness of breath, constipation or diarrhea at that time.  On 3/20 he had a virtual visit and continued to have similar symptoms until seen in urgent care this evening when he was referred to the ED.  He notes continued temp to 100-101 every day since the 3/17th and notes abdominal pain is improving but still present. He notes pain is worse after eating.  He denies recent travel, sick contacts, chest pain, shortness of breath, cough, bloody stools.  He has been taking \"lots\" of acetaminophen for his symptoms despite being cautioned on 3/20 to limit usage to correct dosing.     Allergies:  Dilaudid  Morphine    Medications:    Amlodipine  Aspirin 81 mg  Atorvastatin     Past Medical History:    Arthritis  BCC  Cholelithiasis  Heart disease  HTN  Nephrolithiasis  CKD stage 3  IFG  Hyperbilirubinemia    Past Surgical History:    Cholecystectomy  Repair left lower eyelid mohs reconstruction    Family History:    Colorectal cancer - mother  Cardiovascular - father  Heart surgery - brother  DM type 2 - brother  Melanoma - brother    Social History:  Smoking status: never smoker  Alcohol use: yes  Drug use: no  The patient presents to the emergency department by himself.  PCP: Jacob Graham  Marital Status:       Review of Systems   Constitutional: Positive for chills, fatigue and fever.   HENT: Negative for congestion and sore throat.    Eyes: Negative for pain.   Respiratory: Negative for cough, chest tightness and shortness of breath.    Cardiovascular: Negative for chest pain.   Gastrointestinal: Positive for abdominal pain and nausea. Negative for blood in stool, constipation, diarrhea " and vomiting.   Genitourinary: Negative for dysuria and testicular pain.   Musculoskeletal: Positive for myalgias. Negative for back pain and neck pain.   Skin: Negative for rash.   Neurological: Positive for weakness. Negative for light-headedness and headaches.   All other systems reviewed and are negative.    Physical Exam     Patient Vitals for the past 24 hrs:   Temp Temp src Pulse Heart Rate Resp SpO2   03/27/20 2246 -- -- 98 98 -- 98 %   03/27/20 2141 -- -- 97 97 18 98 %   03/27/20 2029 98.5  F (36.9  C) Oral -- 105 16 95 %     Physical Exam  Nursing notes reviewed. Vitals reviewed.  General: Alert. Well kept.  Eyes:  Conjunctiva non-injected, non-icteric.  Neck/Throat: Moist mucous membranes, oropharynx without erythema or exudate. No cervical lymphadenopathy.  Normal voice.  Cardiac: Regular rhythm. Normal heart sounds with no murmur/rubs/click.   Pulmonary: Clear and equal breath sounds bilaterally. No crackles/rales. No wheezing  Abdomen: soft.  RUQ and right mid-abdomen tenderness to palpation.  No CVA tenderness.  Musculoskeletal: Normal gross range of motion of all 4 extremities.    Neurological: Alert and oriented x4.   Skin: Warm and dry without rashes or petechiae. Normal appearance of visualized exposed skin.  Psych: Affect normal. Good eye contact.    Emergency Department Course   Imaging:  Radiographic findings were communicated with the patient who voiced understanding of the findings.  CT Abdomen pelvis with contrast:   IMPRESSION:   1.  Occlusive thrombus involving the inferior mesenteric vein including sigmoid veins, middle and left colic veins with stranding within the left side of the mesentery extending into the pelvis. In addition there is nonocclusive thrombus at the portal   splenic confluence with occlusive thrombosis of the inferior right portal venous system.     2.  Colonic diverticula but no CT evidence for diverticulitis.     3.  Small nonobstructing stone right kidney.     4.   Cholecystectomy. as per radiology.    XR Chest 1 view, portable:   IMPRESSION: Clear lungs. No pneumothorax or pleural effusion. Normal heart size and mediastinal contours. Small ossification projecting adjacent to the right humeral neck medially is probably a loose body in the glenohumeral recess. There are some degenerative changes of the right shoulder.  as per radiology.    Laboratory:  CBC: WBC: 9.4, HGB: 13.1 (L), PLT: 389  CMP: Glucose 107 (H), Albumin 2.8 (L), Alkaline phosphatase 159 (H),  (H),  (H), o/w WNL (Creatinine: 0.96)  UA: Ketones 5, Protein albumin 30, Urobilinogen 4.0 (H), Nitrite positive, Leukocyte esterase large, WBC 16 (H), Bacteria many, Mucous present, o/w Negative  Urine culture aerobic bacterial: pending  2105 Lactic acid: 1.5  Lipase: 382    Interventions:  2116 NS 1000 mL IV    Emergency Department Course:  Nursing notes and vitals reviewed. (2040) I performed an exam of the patient as documented above.     IV inserted. Medicine administered as documented above. Blood drawn. Urine sample obtained. This was sent to the lab for further testing, results above.     The patient was sent for a CT while in the emergency department, findings above.     2230 I consulted Dr. Fuentes of Radiology and discussed the patient's imaging.    2300 I discussed the patient with Dr. Ramirez in shared service.    2254 I rechecked the patient and discussed the results of his workup thus far.     2300 I spoke with Dr. Moore, vascular surgery, regarding the CT findings.    2330  I consulted with Dr. Núñez of the hospitalist services. He is in agreement to accept the patient for admission.    Findings and plan explained to the Patient who consents to admission. Discussed the patient with Dr. Núñez, who will admit the patient to a medicine bed for further monitoring, evaluation, and treatment.     Impression & Plan    Medical Decision Making:  Billie Aden is a 64 year old male who presents  for evaluation of 11 days of abdominal pain and fevers. On exam he is tender to the RUQ with no peritoneal signs but does note this is the pain he has been having.   Labwork returns with a normal WBC at 9.4. He does have anemia at 13.1 down from 16 last fall. His platelets are normal. Kidney function is also normal. Bilirubin is 1.0 but  and  and alk phos is 159 with a normal lipase. CT abdomen pelvis shows an occlusive thrombus on the inferior mesenteric vein with stranding on the left side of the mesentery and a non-occlusive thrombus at the portal splenic confluence with occlusive thrombus of the inferior right portal venous system.  He is also noted to have a UTI. He was started on ceftriaxone but developed one hive on the left side of his neck. This was stopped and he was restarted on Levofloxacin.  Urine sent for culture.   I spoke with vascular surgery, Dr. Moore, who recommends hypercoagulation work-up prior to initiation of heparin.  She recommends clear liquids or NPO overnight.  She will see the patient while hospitalized tomorrow with expectation for likely 3 months of outpatient anticoagulation and notes there is no indication for interval imaging at this time. These labs were initiated in the ED. I then spoke with Dr. Núñez, the hospitalist, who accepted the patient for admission and he will start anticoagulation after labs are drawn.  The patient is in agreement with this plan of care. He has had no cough, congestion, sore throat or myalgias associated with these fevers.  He has no symptoms of COVID and no indication for testing today.    Diagnosis:    ICD-10-CM    1. Transaminitis  R74.0 Urine Culture Aerobic Bacterial   2. Urinary tract infection  N39.0    3. Mesenteric vein thrombosis  I81    4. Portal vein thrombosis  I81      Disposition:  Admitted to Dr. Wilton Deshpande  3/27/2020    EMERGENCY DEPARTMENT  Scribe Disclosure:  I, Bob Deshpande, am serving as a scribe at 8:40  PM on 3/27/2020 to document services personally performed by Aspen Dowling DNP based on my observations and the provider's statements to me.        Aspen Dowling CNP  03/27/20 9517       Aspen Dowling CNP  03/27/20 2403

## 2020-03-28 NOTE — ED NOTES
Pt abx stopped;  Pt appeared to have hive on left jaw region after 15-20 mLs of abx had been administered.  Denies SOB.  No other hives noted.

## 2020-03-28 NOTE — ED TRIAGE NOTES
Pt states that he has had a fever for the last 11 days;  With some upper abd pain.  Denies cough, SOB or CP.  Was seen at urgent care and they eventually sent him here for a CT per pt.

## 2020-03-28 NOTE — ED PROVIDER NOTES
Emergency Department Attending Supervision Note  3/27/2020  10:54 PM      I evaluated this patient with Aspen Dowling CNP.       Briefly, the patient presented with 11 days of fever and epigastric pain associated with urinary frequency and anorexia.      On my exam, mild epigastric tenderness and mild tachycardia.    Workup is notable for:  XR Chest Port 1 View   Final Result   IMPRESSION: Clear lungs. No pneumothorax or pleural effusion. Normal heart size and mediastinal contours. Small ossification projecting adjacent to the right humeral neck medially is probably a loose body in the glenohumeral recess. There are some    degenerative changes of the right shoulder.            CT Abdomen Pelvis w Contrast   Final Result   IMPRESSION:    1.  Occlusive thrombus involving the inferior mesenteric vein including sigmoid veins, middle and left colic veins with stranding within the left side of the mesentery extending into the pelvis. In addition there is nonocclusive thrombus at the portal    splenic confluence with occlusive thrombosis of the inferior right portal venous system.      2.  Colonic diverticula but no CT evidence for diverticulitis.      3.  Small nonobstructing stone right kidney.      4.  Cholecystectomy.      5.  Results discussed with Dr. Dowling           Labs Ordered and Resulted from Time of ED Arrival Up to the Time of Departure from the ED   CBC WITH PLATELETS DIFFERENTIAL - Abnormal; Notable for the following components:       Result Value    RBC Count 3.99 (*)     Hemoglobin 13.1 (*)     Hematocrit 38.3 (*)     All other components within normal limits   COMPREHENSIVE METABOLIC PANEL - Abnormal; Notable for the following components:    Glucose 107 (*)     Albumin 2.8 (*)     Alkaline Phosphatase 159 (*)      (*)      (*)     All other components within normal limits   ROUTINE UA WITH MICROSCOPIC REFLEX TO CULTURE - Abnormal; Notable for the following components:    Ketones Urine 5  (*)     Protein Albumin Urine 30 (*)     Urobilinogen mg/dL 4.0 (*)     Nitrite Urine Positive (*)     Leukocyte Esterase Urine Large (*)     WBC Urine 16 (*)     Bacteria Urine Many (*)     Mucous Urine Present (*)     All other components within normal limits   LACTIC ACID WHOLE BLOOD   LIPASE   FACTOR 2 PROTHROMBIN 07203F MUT ANAL   PROTEIN S ANTIGEN FREE   FACTOR 5 LEIDEN MUTATION ANALYSIS   PROTEIN C CHROMOGENIC   PARTIAL THROMBOPLASTIN TIME   INR   LUPUS ANTICOAGULANT PANEL   ANTITHROMBIN III   PERIPHERAL IV CATHETER   FREE WATER   URINE CULTURE AEROBIC BACTERIAL   URINE CULTURE AEROBIC BACTERIAL       In summary, 64-year-old male without significant past medical history presenting for evaluation of 11 days epigastric pain and low-grade fever found to have multiple mesenteric vein thromboses as well as portal vein thromboses mild transaminitis and UA suggestive of UTI.  He has no prior history of clot.  Radiology thought maybe this could be complication from diverticulitis however he has persistence of his epigastric pain which she has had for 11 days and no evidence of this on CT imaging.  I do not suspect UTI as the cause of his fever, he reported his symptoms of this only started to 3 days ago.  I did add on blood cultures after discussion with APC, though these were sent after he got a partial dose of ceftriaxone to which he had a small urticarial hive.  These were sent due to patient endorsing rigors though this was approximately a week ago, I feel gram-negative joaquim bacteremia is unlikely.  In discussion with vascular surgery, they recommended hypercoagulable work-up and then initiation on heparin with plan for consultation in the morning.  As such patient was admitted to medicine.  No evidence of sepsis.  I do not suspect COVID-19. CXR clear.     Visit Diagnosis, Associated Orders, and Comments     ICD-10-CM    1. Transaminitis  R74.0 Urine Culture Aerobic Bacterial        2. Urinary tract infection   N39.0          Disposition:  Admit to medicine     Jasvir Ramirez MD  [facility]  [facility] Emergency Department    10:54 PM 03/27/20           Jasvir Ramirez MD  03/27/20 2340

## 2020-03-28 NOTE — PLAN OF CARE
"Patient is A/ox4, flat affect at times. VSS on RA. Tele NSR. Afebrile this shift. C/o mid, upper abdominal pain (4/10), PRN Tylenol offered but patient refused. LS clear. Tolerating clear liquid diet.  Appetite \"slowly\" improving per patient report. Denied nausea/vomiting. Abdomen distended and firm. LUQ notably more tender than other quadrants. Voiding frequently with urinal at bedside, urine clear,yellow - patient denies dysuria. R PIV infusing NS at 75mL/hr. Heparin gtt increased to 14.5mL/hr at 1100 today, Hep 10 recheck at 1700 this evening. Discharge pending, once pain improved and on PO anticoagulation. Nursing to continue to monitor.  "

## 2020-03-28 NOTE — ED NOTES
Bed: ED11  Expected date:   Expected time:   Means of arrival:   Comments:  Hold for possible surge pt.

## 2020-03-28 NOTE — CONSULTS
"Vascular Surgery Consultation     Billie Aden MRN# [id number]   YOB: 1955 Age: 64 year old   Date of Admission: 3/27/2020     Reason for consult: Portal venous thrombosis                   Reason for Consult:   Portal venous thrombosis  History is obtained from the patient and electronic health record         History of Present Illness:   Mr. Aden is a 64 year old male who presents with history of 10 days of fever, chills, anorexia, and abdominal pain exacerbated by eating. He was found on work-up to have extensive inferior mesenteric, splenic, and portal venous thrombosis of idiopathic etiology. He has also been found to have E coli urinary tract infection.       He relates that his pain developed about 10 days ago, characterized as upper abdominal cramping, aching, and \"twisting\" pain. It was exacerbated by eating so he remained anorexic for about 5 days. It felt similar in quality to his prior cholelithiasis symptoms. He had some resolution of the discomfort and was able to start eating again; however, he developed significant bloating. He has not had a bowel movement in about a week, which is unusual for him; usually he has a daily bowel movement. He has continued to pass flatus and has not had nausea or emesis.     He states that he was not drinking much fluid and noticed darkening of his urine several days ago, along with dysuria; notably, this was about a week after he had the onset of abdominal pain and fevers, and to his mind, the UTI was secondary to his dehydration.     Last colonoscopy was 10/2018 and was notable only for diverticulosis; in 10/2013 he underwent removal of adenomatous polyps.   No history of pancreatitis.   No history of blood clots or known familial hypercoagulable disorders.   No recent trauma.             Past Medical History:   I have reviewed and updated this patient's past medical history  Past Medical History:   Diagnosis Date     Arthritis      BCC (basal cell " carcinoma of skin) 11/2017    left lower lid     Cholelithiasis 10/8/2013     Heart disease      Hypertension 08/2018     Pelvic fracture (H) 11/2014    occured while horseback riding     Renal disease     kidney stone             Past Surgical History:   I have reviewed this patient's past surgical history  Past Surgical History:   Procedure Laterality Date     CHOLECYSTECTOMY       COLONOSCOPY       HC CLOSED TX SHOULDER DISLOC W MANIP NO ANESTH       LAPAROSCOPIC CHOLECYSTECTOMY N/A 12/23/2014    Procedure: LAPAROSCOPIC CHOLECYSTECTOMY;  Surgeon: Avelino Bryson MD;  [facility]:  [facility]     REPAIR MOHS Left 1/25/2018    Procedure: REPAIR MOHS;  Left Lower Eyelid Mohs Reconstruction;  Surgeon: Claudia Melvin MD;  Location:  [facility]            Social History:     Social History     Tobacco Use     Smoking status: Never Smoker     Smokeless tobacco: Never Used   Substance Use Topics     Alcohol use: Yes     Alcohol/week: 0.0 standard drinks     Comment: occasionally     Non-smoker, past or current. Has alcohol on Fridays and Saturdays, but does not drink to the point of inebriation. Denies current illicit drug use. Works currently in [employer].           Family History:     Family History   Problem Relation Age of Onset     Cancer - colorectal Mother 79     Cardiovascular Father 70        Had a carotid endarterectomy at age 70 years.  Tobacco user.     Heart Surgery Brother 66        Alive. Had CABG at age 66 years. Smoker     Diabetes Type 2  Brother      Melanoma Brother    No known familial hypercoagulable or bleeding disorders.           Allergies:     Allergies   Allergen Reactions     Dilaudid [Hydromorphone]      Agitation, did not help with pain     Morphine Anxiety             Medications:     Current Facility-Administered Medications Ordered in Epic   Medication Dose Frequency Last Dose     acetaminophen (TYLENOL) Suppository 325 mg  325 mg Q4H PRN       acetaminophen (TYLENOL) tablet 650 mg  650 mg Q4H   mg at 03/28/20 0212     bisacodyl (DULCOLAX) Suppository 10 mg  10 mg Daily PRN       heparin 25,000 units in 0.45% NaCl 250 mL ANTICOAGULANT  infusion  1,450 Units/hr Continuous 1,450 Units/hr at 03/28/20 1055     HYDROcodone-acetaminophen (NORCO) 5-325 MG per tablet 1-2 tablet  1-2 tablet Q4H PRN       HYDROmorphone (PF) (DILAUDID) injection 0.2 mg  0.2 mg Q2H PRN       [START ON 3/29/2020] levofloxacin (LEVAQUIN) infusion 500 mg  500 mg Q24H       lidocaine (LMX4) cream   Q1H PRN       lidocaine 1 % 0.1-1 mL  0.1-1 mL Q1H PRN       magnesium sulfate 4 g in 100 mL sterile water (premade)  4 g Q4H PRN       melatonin tablet 1 mg  1 mg At Bedtime PRN       naloxone (NARCAN) injection 0.1-0.4 mg  0.1-0.4 mg Q2 Min PRN       ondansetron (ZOFRAN-ODT) ODT tab 4 mg  4 mg Q6H PRN      Or     ondansetron (ZOFRAN) injection 4 mg  4 mg Q6H PRN       polyethylene glycol (MIRALAX) Packet 17 g  17 g Daily PRN       potassium chloride (KLOR-CON) Packet 20-40 mEq  20-40 mEq Q2H PRN       potassium chloride 10 mEq in 100 mL intermittent infusion with 10 mg lidocaine  10 mEq Q1H PRN       potassium chloride 10 mEq in 100 mL sterile water intermittent infusion (premix)  10 mEq Q1H PRN       potassium chloride 20 mEq in 50 mL intermittent infusion  20 mEq Q1H PRN       potassium chloride ER (KLOR-CON M) CR tablet 20-40 mEq  20-40 mEq Q2H PRN       senna-docusate (SENOKOT-S/PERICOLACE) 8.6-50 MG per tablet 1 tablet  1 tablet BID PRN      Or     senna-docusate (SENOKOT-S/PERICOLACE) 8.6-50 MG per tablet 2 tablet  2 tablet BID PRN       sodium chloride (PF) 0.9% PF flush 3 mL  3 mL q1 min prn       sodium chloride (PF) 0.9% PF flush 3 mL  3 mL Q8H       sodium chloride 0.9% infusion   Continuous       No current Epic-ordered outpatient medications on file.             Review of Systems:   The 10 point Review of Systems is negative other than noted in the HPI          Physical Exam:   Vitals were reviewed  Temp: 97.8  F (36.6  C)  "Temp src: Oral BP: 133/84 Pulse: 101 Heart Rate: 84 Resp: 17 SpO2: 94 % O2 Device: None (Room air)      General: standing comfortably in room, NAD.  Neuro/Psych: A&O x 4, pleasantly conversant   HENT: EOMI and conjugate. Moist mucous membranes.   Cardiac: Regular rate and rhythm, no m/g appreciated.   Pulm: Lungs CTAB, no w/r/r.  Abd: Soft but distended and tympanitic, no tenderness elicited with palpation.   Extrem: Grossly normal and symmetric ROM in all four extremities. No edema.  Skin: Clean, dry, no rashes or wounds.          Data:          Lab Results   Component Value Date     03/28/2020    Lab Results   Component Value Date    CHLORIDE 105 03/28/2020    Lab Results   Component Value Date    BUN 12 03/28/2020      Lab Results   Component Value Date    POTASSIUM 4.3 03/28/2020    Lab Results   Component Value Date    CO2 27 03/28/2020    Lab Results   Component Value Date    CR 0.93 03/28/2020        Lab Results   Component Value Date    WBC 9.2 03/28/2020    HGB 12.7 (L) 03/28/2020    HCT 37.6 (L) 03/28/2020    MCV 95 03/28/2020     03/28/2020     Lab Results   Component Value Date    AST 69 (H) 03/28/2020     (H) 03/28/2020    ALKPHOS 141 03/28/2020    BILITOTAL 1.4 (H) 03/28/2020    BILICONJ 0.0 09/20/2013     Lab Results   Component Value Date    INR 1.14 03/28/2020     UA/UCx (3/27/20): notable for nitrite (+), leuk esterase (+), > 100,000 cfu of E coli    Echocardiogram (6/20/16): \"This was a normal stress echocardiogram with no evidence of stress-induced ischemia. Incidentally noted mild (4.1cm) aortic root enlargement. Ascending aorta not well visualized on this study. Aortic valve is trileaflet.\"    I have reviewed the following images:   CT Abd/Pelvis (3/27/20): \"1.  Occlusive thrombus involving the inferior mesenteric vein including sigmoid veins, middle and left colic veins with stranding within the left side of the mesentery extending into the pelvis. In addition there is " "nonocclusive thrombus at the portal  splenic confluence with occlusive thrombosis of the inferior right portal venous system. 2.  Colonic diverticula but no CT evidence for diverticulitis. 3.  Small nonobstructing stone right kidney. 4.  Cholecystectomy.\" Bilateral inguinal hernias also incidentally seen.           Assessment and Plan:   Mr. Aden is a 64 year old male who presents with inferior mesenteric, splenic, and portal venous thrombosis of unclear etiology. He additionally has an E coli urinary tract infection, and this infection and inflammation may represent a possible source, vs other etiologies of hypercoagulability.       Recommend CLD for bowel rest until his bloating abdominal pain has resolved and/or he has regular bowel movements    Continue with therapeutic anticoagulation; would plan on continuing at least three months, and potentially longer    Complete hypercoag work-up    Exercise and activity as tolerated    No surgical intervention unless he has bowel ischemia from venous occlusion. This is relatively unlikely to develop as he has presumably had thrombus present for the last 11 days or longer and is now on anticoagulation, but merits consideration and a low threshold to investigate.     No vascular surgical intervention; will defer to Vascular Medicine. Please call with any further questions or concerns.     Luann Moore MD          "

## 2020-03-29 LAB
ALBUMIN SERPL-MCNC: 2.5 G/DL (ref 3.4–5)
ALP SERPL-CCNC: 123 U/L (ref 40–150)
ALT SERPL W P-5'-P-CCNC: 89 U/L (ref 0–70)
ANION GAP SERPL CALCULATED.3IONS-SCNC: 5 MMOL/L (ref 3–14)
AST SERPL W P-5'-P-CCNC: 45 U/L (ref 0–45)
BACTERIA SPEC CULT: ABNORMAL
BASOPHILS # BLD AUTO: 0 10E9/L (ref 0–0.2)
BASOPHILS NFR BLD AUTO: 0.3 %
BILIRUB SERPL-MCNC: 1.1 MG/DL (ref 0.2–1.3)
BUN SERPL-MCNC: 10 MG/DL (ref 7–30)
CALCIUM SERPL-MCNC: 8.6 MG/DL (ref 8.5–10.1)
CHLORIDE SERPL-SCNC: 108 MMOL/L (ref 94–109)
CO2 SERPL-SCNC: 25 MMOL/L (ref 20–32)
CREAT SERPL-MCNC: 0.93 MG/DL (ref 0.66–1.25)
DIFFERENTIAL METHOD BLD: ABNORMAL
EOSINOPHIL # BLD AUTO: 0.2 10E9/L (ref 0–0.7)
EOSINOPHIL NFR BLD AUTO: 3.1 %
ERYTHROCYTE [DISTWIDTH] IN BLOOD BY AUTOMATED COUNT: 12.4 % (ref 10–15)
GFR SERPL CREATININE-BSD FRML MDRD: 85 ML/MIN/{1.73_M2}
GLUCOSE SERPL-MCNC: 108 MG/DL (ref 70–99)
HCT VFR BLD AUTO: 36.1 % (ref 40–53)
HGB BLD-MCNC: 12.1 G/DL (ref 13.3–17.7)
IMM GRANULOCYTES # BLD: 0 10E9/L (ref 0–0.4)
IMM GRANULOCYTES NFR BLD: 0.3 %
LMWH PPP CHRO-ACNC: 0.29 IU/ML
LMWH PPP CHRO-ACNC: 0.34 IU/ML
LYMPHOCYTES # BLD AUTO: 1.3 10E9/L (ref 0.8–5.3)
LYMPHOCYTES NFR BLD AUTO: 17.8 %
Lab: ABNORMAL
MCH RBC QN AUTO: 31.6 PG (ref 26.5–33)
MCHC RBC AUTO-ENTMCNC: 33.5 G/DL (ref 31.5–36.5)
MCV RBC AUTO: 94 FL (ref 78–100)
MONOCYTES # BLD AUTO: 0.5 10E9/L (ref 0–1.3)
MONOCYTES NFR BLD AUTO: 7.3 %
NEUTROPHILS # BLD AUTO: 5.2 10E9/L (ref 1.6–8.3)
NEUTROPHILS NFR BLD AUTO: 71.2 %
NRBC # BLD AUTO: 0 10*3/UL
NRBC BLD AUTO-RTO: 0 /100
PLATELET # BLD AUTO: 388 10E9/L (ref 150–450)
POTASSIUM SERPL-SCNC: 4.1 MMOL/L (ref 3.4–5.3)
PROT SERPL-MCNC: 6.8 G/DL (ref 6.8–8.8)
RBC # BLD AUTO: 3.83 10E12/L (ref 4.4–5.9)
SODIUM SERPL-SCNC: 138 MMOL/L (ref 133–144)
SPECIMEN SOURCE: ABNORMAL
WBC # BLD AUTO: 7.4 10E9/L (ref 4–11)

## 2020-03-29 PROCEDURE — 85520 HEPARIN ASSAY: CPT | Performed by: HOSPITALIST

## 2020-03-29 PROCEDURE — 99233 SBSQ HOSP IP/OBS HIGH 50: CPT | Performed by: INTERNAL MEDICINE

## 2020-03-29 PROCEDURE — 36415 COLL VENOUS BLD VENIPUNCTURE: CPT | Performed by: HOSPITALIST

## 2020-03-29 PROCEDURE — 25000132 ZZH RX MED GY IP 250 OP 250 PS 637: Performed by: INTERNAL MEDICINE

## 2020-03-29 PROCEDURE — 12000000 ZZH R&B MED SURG/OB

## 2020-03-29 PROCEDURE — 36415 COLL VENOUS BLD VENIPUNCTURE: CPT | Performed by: INTERNAL MEDICINE

## 2020-03-29 PROCEDURE — 25800030 ZZH RX IP 258 OP 636: Performed by: HOSPITALIST

## 2020-03-29 PROCEDURE — 80053 COMPREHEN METABOLIC PANEL: CPT | Performed by: HOSPITALIST

## 2020-03-29 PROCEDURE — 85025 COMPLETE CBC W/AUTO DIFF WBC: CPT | Performed by: HOSPITALIST

## 2020-03-29 PROCEDURE — 85520 HEPARIN ASSAY: CPT | Performed by: INTERNAL MEDICINE

## 2020-03-29 RX ORDER — LEVOFLOXACIN 250 MG/1
500 TABLET, FILM COATED ORAL DAILY
Status: DISCONTINUED | OUTPATIENT
Start: 2020-03-29 | End: 2020-03-30 | Stop reason: HOSPADM

## 2020-03-29 RX ADMIN — ASPIRIN 81 MG: 81 TABLET, DELAYED RELEASE ORAL at 08:41

## 2020-03-29 RX ADMIN — APIXABAN 10 MG: 5 TABLET, FILM COATED ORAL at 20:23

## 2020-03-29 RX ADMIN — SODIUM CHLORIDE: 9 INJECTION, SOLUTION INTRAVENOUS at 01:05

## 2020-03-29 RX ADMIN — LEVOFLOXACIN 500 MG: 250 TABLET, FILM COATED ORAL at 20:23

## 2020-03-29 RX ADMIN — Medication 2400 UNITS: at 07:34

## 2020-03-29 RX ADMIN — APIXABAN 10 MG: 5 TABLET, FILM COATED ORAL at 13:54

## 2020-03-29 ASSESSMENT — ACTIVITIES OF DAILY LIVING (ADL)
ADLS_ACUITY_SCORE: 11
ADLS_ACUITY_SCORE: 11
ADLS_ACUITY_SCORE: 13
ADLS_ACUITY_SCORE: 11

## 2020-03-29 ASSESSMENT — MIFFLIN-ST. JEOR: SCORE: 1740.18

## 2020-03-29 NOTE — PLAN OF CARE
A&O, calm and cooperative, Tele NSR, VSS on RA, LS clear, BS audible and active,  on Clear liquid diet, tolerating it well, denies nausea, Continent of both B and B, voiding adequately, independent in transfers, with discomfort on abdomen, declines pain medication, heparin infusing. Heparin 10a for recheck @ 2am . Discharge plan TBD.

## 2020-03-29 NOTE — PHARMACY
Pt has active [employer] insurance (Medicare Part D through [facility]) for outpatient prescription medications.    Xarelto and Eliquis both covered by pt's insurance and pt qualifies for 1-month free trial for each medication.  Pricing is comparable between the two as long as loading doses aren't needed.     Eliquis 5mg #60 = $385.70 while working on $350 deductible.  Somewhere around ~$120/month thereafer      Xarelto 20mg #30 = $385.70 while working on $350 deductible.  Somewhere around ~$120/month thereafer          Please call [phone] with questions.    Thank you,  Jim Nuñez, Pharmacy Intern  [facility]  P: [phone]

## 2020-03-29 NOTE — PLAN OF CARE
Patient is A/ox4. VSS on RA. Afebrile. Tele NSR. Denied abdominal pain this shift. Advanced to full liquid diet for lunch, tolerated, advanced to low fiber for supper. Abdomen rounded, bust less distended. Abdomen soft/non-tender to palpation. BMx1, medium, loose bowel movement. Voiding adequately via urinal. Heparin gtt stopped this afternoon and started on PO Eliquis. IVF discontinued. Transitioned to PO abx for UTI. LFT trending down since admission. Up independently. Plan for discharge tomorrow and follow up outpatient with Oncology in 3-6 months. Nursing to continue to monitor.

## 2020-03-29 NOTE — PLAN OF CARE
A&Ox4.  VSS, RA.  Complained of abdominal pain; declined interventions this shift.  Tolerating clear liquid diet without problems.  2 PIVs; one with heparin drip @ 16 mL/hr (Hep 10a: 0.34, within goal range) and NS @ 75 mL/hr, other PIV SL with intermittent antibiotics (Levaquin and heparin are not compatible).  Independent in room.  Voiding without problems in urinal at the bedside; one small BM this shift.  Discharge pending progress.

## 2020-03-29 NOTE — PROGRESS NOTES
[facility]    Medicine Progress Note - Hospitalist Service       Date of Admission:  3/27/2020  Assessment & Plan  Billie Adne is a 64 year old male who presents with fever.  Admitted for further evaluation and treatment.     Inferior mesenteric vein occlusive thrombus, nonocclusive thrombus at the portal splenic confluence and elevated liver E: CT completed in emergency department showed nonocclusive thrombus involving the inferior mesenteric vein including sigmoid veins, middle and left colic veins with stranding within the left side of the mesentery extending into the pelvis.  In addition there is nonocclusive thrombus at the portal splenic confluence with occlusive thrombus of the inferior right portal venous system, colonic diverticula but no CT evidence for diverticulitis, small nonobstructing stone right kidney, cholecystectomy, per report.  Alkaline phosphatase is elevated at 159, ALT is 152, AST is 102, lactic acid is 1.5, lipase level is 382.  Patient reporting fever times twisting right-sided abdominal pain with associated nausea and fever up to 101-102 and urine cx + for E coli. Afebrile this am. Patient did have a virtual visit and was instructed to come to the emergency department for further evaluation. Patient states abdominal pain  still present and worsening with fever continued, thus presented to the emergency department for further evaluation.  Patient does state that the pain is worse after eating.  Patient denies headache, eye pain, ear pain, sore throat, cough, blood in the stool or urine, chest pain, shortness of breath, lower extremity edema.  Patient does state he has been taking acetaminophen for his fever.  -We will switch patient from heparin drip today 3/29 to Eliquis, 10 mg twice daily for 7 days and then 5 mg twice daily will treat like DVT/PE  -Vascular surgery consulted.  No plan for surgery at this point  -Patient seen by hematology, they would follow-up with him  after 3 to 6 months, discussed about [facility], pharmacy consult placed to decide on and insurance issues.  -He is on a clear liquid diet, tolerating that well, will advance diet slowly today and monitor overnight prior to discharge.  -His LFTs have improved significantly, will repeat once more tomorrow  -Patient to follow-up with hematology as recommended to  follow-up on the thrombophilic work-up.     Suspected urinary tract infection in patient with kidney stone on CT:  Patient with urinalysis showing large leukocyte esterase. Initially given cephalosporin however, patient with reaction and swelling to left neck, therefore stopped.   - Urine culture : + E coli, sensitivities noted, sensitive to Levaquin he was on that here IV, switch to p.o.     Cardiac, hyperlipidemia, hypertension: Patient maintained on aspirin, amlodipine, atorvastatin in the outpatient setting.  -Holding prior to admission amlodipine, atorvastatin given normal BP and liver E, resume on discharge if the LFTs are back to normal.  -Resume aspirin when verified by pharmacy.      Diet: Advance Diet as Tolerated: Full Liquid Diet    DVT Prophylaxis: therapeutic heparin  Benjamin Catheter: not present  Code Status: Full Code      Disposition Plan   Expected discharge: 3/30/2020 if patient tolerates diet., recommended to home   Entered: Jennyfer Bush MD 03/29/2020, 12:17 PM       The patient's care was discussed with the Bedside Nurse and Patient.    Jennyfer Bush MD  Hospitalist Service  [facility]    ______________________________________________________________________    Interval History   And is feeling better, abdominal pain is improved, he is tolerating clear liquid diet, would like to advance diet, we discussed about advancing diet today slowly, if he can tolerate full liquid diet today we can advance tonight to surgical soft and if he tolerates both will discharge home, pending input from pharmacist about right anticoagulant  for him considering his insurance.  We discussed possible discharge home tomorrow.    Data reviewed today: I reviewed all medications, new labs and imaging results over the last 24 hours. I personally reviewed no images or EKG's today.    Physical Exam   Vital Signs: Temp: 97.9  F (36.6  C) Temp src: Oral BP: 136/71   Heart Rate: 83 Resp: 18 SpO2: 95 % O2 Device: None (Room air)    Weight: 212 lbs 11.2 oz  Exam:  Constitutional: Awake, alert, cooperative, no apparent distress  Respiratory: Clear to auscultation bilaterally, no crackles or wheezing  Cardiovascular: Regular rate and rhythm, normal S1 and S2, and no murmur noted  GI: Normal bowel sounds, soft, non-distended, non-tender  Skin/Integumen: No rashes, no cyanosis, no edema  Neuro : moving all 4 extremities, no focal deficit noted         Data   Recent Labs   Lab 03/29/20  0625 03/28/20  0623 03/28/20  0002 03/27/20  2105   WBC 7.4 9.2  --  9.4   HGB 12.1* 12.7*  --  13.1*   MCV 94 95  --  96    366  --  389   INR  --  1.14 1.11  --     136  --  137   POTASSIUM 4.1 4.3  --  3.9   CHLORIDE 108 105  --  104   CO2 25 27  --  30   BUN 10 12  --  16   CR 0.93 0.93  --  0.96   ANIONGAP 5 4  --  3   ARMANI 8.6 8.4*  --  8.9   * 105*  --  107*   ALBUMIN 2.5* 2.5*  --  2.8*   PROTTOTAL 6.8 7.0  --  7.8   BILITOTAL 1.1 1.4*  --  1.0   ALKPHOS 123 141  --  159*   ALT 89* 118*  --  152*   AST 45 69*  --  102*   LIPASE  --   --   --  382     No results found for this or any previous visit (from the past 24 hour(s)).  Medications     - MEDICATION INSTRUCTIONS -         apixaban ANTICOAGULANT  10 mg Oral BID     aspirin  81 mg Oral Daily     levofloxacin  500 mg Oral Daily     sodium chloride (PF)  3 mL Intracatheter Q8H

## 2020-03-30 VITALS
HEART RATE: 101 BPM | BODY MASS INDEX: 30.85 KG/M2 | DIASTOLIC BLOOD PRESSURE: 71 MMHG | SYSTOLIC BLOOD PRESSURE: 137 MMHG | RESPIRATION RATE: 18 BRPM | OXYGEN SATURATION: 97 % | HEIGHT: 69 IN | WEIGHT: 208.31 LBS | TEMPERATURE: 98.2 F

## 2020-03-30 LAB
ALBUMIN SERPL-MCNC: 2.5 G/DL (ref 3.4–5)
ALP SERPL-CCNC: 117 U/L (ref 40–150)
ALT SERPL W P-5'-P-CCNC: 81 U/L (ref 0–70)
ANION GAP SERPL CALCULATED.3IONS-SCNC: 4 MMOL/L (ref 3–14)
AST SERPL W P-5'-P-CCNC: 40 U/L (ref 0–45)
BILIRUB SERPL-MCNC: 1 MG/DL (ref 0.2–1.3)
BUN SERPL-MCNC: 14 MG/DL (ref 7–30)
CALCIUM SERPL-MCNC: 8.9 MG/DL (ref 8.5–10.1)
CHLORIDE SERPL-SCNC: 108 MMOL/L (ref 94–109)
CO2 SERPL-SCNC: 26 MMOL/L (ref 20–32)
COPATH REPORT: NORMAL
CREAT SERPL-MCNC: 1.09 MG/DL (ref 0.66–1.25)
GFR SERPL CREATININE-BSD FRML MDRD: 71 ML/MIN/{1.73_M2}
GLUCOSE SERPL-MCNC: 144 MG/DL (ref 70–99)
HGB BLD-MCNC: 12.2 G/DL (ref 13.3–17.7)
POTASSIUM SERPL-SCNC: 4 MMOL/L (ref 3.4–5.3)
PROT C ACT/NOR PPP CHRO: 92 % (ref 70–170)
PROT S FREE AG ACT/NOR PPP IA: 116 % (ref 70–148)
PROT SERPL-MCNC: 6.9 G/DL (ref 6.8–8.8)
SODIUM SERPL-SCNC: 138 MMOL/L (ref 133–144)

## 2020-03-30 PROCEDURE — 25000132 ZZH RX MED GY IP 250 OP 250 PS 637: Performed by: INTERNAL MEDICINE

## 2020-03-30 PROCEDURE — 80053 COMPREHEN METABOLIC PANEL: CPT | Performed by: PHYSICIAN ASSISTANT

## 2020-03-30 PROCEDURE — 36415 COLL VENOUS BLD VENIPUNCTURE: CPT | Performed by: PHYSICIAN ASSISTANT

## 2020-03-30 PROCEDURE — 85018 HEMOGLOBIN: CPT | Performed by: PHYSICIAN ASSISTANT

## 2020-03-30 PROCEDURE — 99239 HOSP IP/OBS DSCHRG MGMT >30: CPT | Performed by: PHYSICIAN ASSISTANT

## 2020-03-30 RX ORDER — AMLODIPINE BESYLATE 5 MG/1
5 TABLET ORAL DAILY
Status: DISCONTINUED | OUTPATIENT
Start: 2020-03-30 | End: 2020-03-30 | Stop reason: HOSPADM

## 2020-03-30 RX ORDER — LEVOFLOXACIN 500 MG/1
500 TABLET, FILM COATED ORAL DAILY
Qty: 5 TABLET | Refills: 0 | Status: SHIPPED | OUTPATIENT
Start: 2020-03-30 | End: 2020-04-04

## 2020-03-30 RX ADMIN — APIXABAN 10 MG: 5 TABLET, FILM COATED ORAL at 08:45

## 2020-03-30 RX ADMIN — ASPIRIN 81 MG: 81 TABLET, DELAYED RELEASE ORAL at 08:45

## 2020-03-30 ASSESSMENT — ACTIVITIES OF DAILY LIVING (ADL)
ADLS_ACUITY_SCORE: 11

## 2020-03-30 ASSESSMENT — MIFFLIN-ST. JEOR: SCORE: 1720.28

## 2020-03-30 NOTE — CONSULTS
Per PA-C request to schedule follow-up appointments with Hematology & PCP.  Called and scheduled appointments.    Apr 01, 2020 10:30 AM CDT   Telephone Visit with Jacob Graham MD   [facility] ([facility]  [address] [facility] [id number] [phone]          Note: this is not an onsite visit; there is no need to come to the facility.       May 27, 2020  1:30 PM CDT   Return Visit with Luann Wagner MD   [facility] ([facility])  [address], [address]   [facility] [id number]   [phone]      Bedside RN informed of scheduled visits and will inform pt of scheduled appointments.

## 2020-03-30 NOTE — PLAN OF CARE
A&Ox4.  VSS, RA.  Denies pain.  2 PIVs; SL.  Tolerating low fiber diet without problems.  No BM this shift, + flatus.  Independent, ambulates frequently.  Discharge pending progress.

## 2020-03-30 NOTE — DISCHARGE INSTRUCTIONS
Our Pharmacy Liaison reviewed your insurance coverage for Eliquis, please review costs below:    Anticoagulation coverage check.  Patient has Medicare D through [email or web address] with unmet deductible.   Xarelto/Eliquis   Mar:  Upon receipt of RX, Discharge Pharmacy can dispense 1 month free.   April: $385  (fulfills deductible)   May-Nov: $35.70/mo   Dec: $125/mo (coverage gap)

## 2020-03-30 NOTE — PROGRESS NOTES
Patient is A/ox4. VSS on RA. Tele NSR. Denied abdominal pain this shift.   Tolerated regular diet for breakfast. Abdomen soft/non-tender to palpation. Up independently. AVS packet reviewed with patient at bedside. All questions answered. Reviewed Eliquis regimen with patient, patient acknowledged information reviewed with him. Instructions on regimen also placed in medication list on AVS packet. Additional informational handouts also provided to patient about Eliquis in AVS packet. Discharge medications filled at [facility] discharge pharmacy, all medications correct. PCP and Oncology follow ups scheduled by CC prior to discharge. PIV removed prior to discharge. All personal belongings accounted for and sent home with patient. Patient ambulated down to door 2 with Leona Castillo CNA, patient to be picked up by wife. Patient stable at time of discharge.         .

## 2020-03-30 NOTE — CONSULTS
Anticoagulation coverage check.  Patient has Medicare D through [employer] with unmet deductible.    Xarelto/Eliquis  Mar:  Upon receipt of RX, Discharge Pharmacy can dispense 1 month free.  April: $385  (fulfills deductible)  May-Nov: $35.70/mo  Dec: $125/mo (coverage gap)    Pradaxa is non-formulary and more expensive.    Dot Pastor CphT  [facility]  Discharge Pharmacy Liaison  Liaison Cell: [phone]

## 2020-03-30 NOTE — DISCHARGE SUMMARY
[facility]  Hospitalist Discharge Summary       Date of Admission:  3/27/2020  Date of Discharge:  3/30/2020  Discharging Provider: Sherley Herrmann PA-C    Discharge Diagnoses   Inferior mesenteric, splenic, and portal venous thrombosis, acute  Elevated liver enzymes   Fatty liver disease  E Coli UTI  Nonobstructing right kidney stone, small    Follow-ups Needed After Discharge   Follow-up Appointments     Follow-up and recommended labs and tests       Follow up with primary care provider, Jacob Graham MD, within   7 days for hospital follow-up.  No follow up labs or test are needed.    Sufficient to call the clinic and discuss recent hospitalization over   telephone.     Follow-up with Hematology in 6-8 weeks; our Care Coordinator helped to   facilitate establishing this appointment.           Unresulted Labs Ordered in the Past 30 Days of this Admission     Date and Time Order Name Status Description    3/30/2020 0828 Factor 2 and 5 mutation analysis In process     3/28/2020 1522 MPL CALR NGS Reflex Panel In process     3/28/2020 1522 JAK2 with reflex to MPL CALR MPN FOC NGS In process     3/28/2020 1521 Paroxysmal Nocturnal Hemoglobinuria In process     3/27/2020 2343 Blood culture Preliminary     3/27/2020 2343 Blood culture Preliminary     3/27/2020 2324 Lupus Anticoagulant Panel In process     3/27/2020 2324 Factor 5 leiden mutation analysis In process     3/27/2020 2324 F2 prothrombin 09506P Mut Anal In process       These results will be followed up by PCP and Hematology.     Discharge Disposition   Discharged to home  Condition at discharge: Stable    Hospital Course   Billie Aden is a 64 year old male with PMHx HTN and dyslipidemia who presented 3/27/20 with fever, chills, anorexia, and abdominal pain exacerbated by eating, found to have extensive inferior mesenteric, splenic and portal venous thrombosis of idiopathic etiology.  Admitted for further  evaluation and treatment. Please refer to H&P by Dr. Núñez from 3/27/2020 for complete details on presentation.      Inferior mesenteric, splenic, and portal venous thrombosis, acute: Presenting with fever (Tmax 101-102 degrees fahrenheit reported at home), chills, anorexia, and abdominal pain exacerbated by eating. CT A/P on admission with occlusive thrombus involving the inferior mesenteric vein including sigmoid veins, middle and left colic veins with stranding within the left side of the mesentery extending into the pelvis. Also note nonocclusive thrombus at the portal splenic confluence with occlusive thrombosis of the inferior right portal venous system. CMP with elevated transaminases on admission. CBC with anemia, otherwise unremarkable. On admission, denied headache, eye pain, ear pain, sore throat, cough, blood in the stool or urine, chest pain, shortness of breath, lower extremity edema. Had been taking tylenol for fever.   -- Vascular surgery consulted, see formal note by Dr. Moore from 3/28/2020. No surgical or vascular intervention needed. Signed off 3/28/2020.   -- Hematology followed patient, see note by Dr. Wagner, signed off 3/29/2020. Follow-up in 6-8 weeks.   -- Hypercoagulable work-up initiated in the ED including factor II, factor V Leiden, protein C, protein S, antithrombin III, lupus anticoagulant.  Hematology also ordered PNH by flow, as well as myeloproliferative panel, including JAK2.   -- Age appropriate cancer screening as outpatient, note last colonoscopy in 10/2018  -- Pharmacy liaison consulted for DOAC coverage, see note from 3/30/2020  -- Switched from heparin drip 3/29/2020 to Eliquis 10 mg twice daily for 7 days and then 5 mg twice daily thereafter. Plan for 3-6 months of anticoagulation, longer pending thrombophilia work-up, but duration ultimately deferred to Hematology. Rx for Eliquis sent at discharge.   -- Diet advanced to regular diet 3/30/2020   -- Pt instructed to  follow-up with PCP in the next week to discuss hospitalization   -- Instructed to return to the ER with worsening symptoms.      Elevated liver enzymes, improved  Fatty liver disease: ,  on admission, since improved to 81 and 40 respectively. CT with above findings as well as fatty liver.   -- Diet and lifestyle modification.      Normocytic anemia: Hgb 12-13. No active signs of bleeding.   -- Stable             Recent Labs   Lab 03/30/20  0835 03/29/20  0625 03/28/20  0623 03/27/20  2105   HGB 12.2* 12.1* 12.7* 13.1*      E Coli UTI  Nonobstructing right kidney stone, small:  Patient with urinalysis showing large leukocyte esterase, culture with pansensitive E. Coli. CT A/P with small, nonobstructing right kidney stone. Initially given cephalosporin (3/27/2020) however, patient with reaction and swelling to left neck, therefore stopped and switched to IV Levaquin 3/28/2020.   - Levaquin IV transitioned to oral 500 mg po qd (3/29/2020), plan for 7 days of antibiotics, rx sent at discharge      Hypertension  Dyslipidemia:   [Norvasc 5 mg po every day, Lipitor 40 mg po every day, ASA 81 mg po every day]  -- PTA Norvasc 5 mg resumed 3/30/2020  -- Holding stain, can resume at discharge   -- Holding ASA while on Eliquis     Consultations This Hospital Stay   [place] [facility] IP CONSULT  VASCULAR SURGERY IP CONSULT  PHARMACY TO DOSE HEPARIN  PHARMACY TO DOSE HEPARIN  HEMATOLOGY & ONCOLOGY IP CONSULT  PHARMACY LIAISON FOR MEDICATION COVERAGE CONSULT  PHARMACY IP CONSULT  CARE TRANSITION RN/SW IP CONSULT    Code Status   Full Code    Time Spent on this Encounter   I, Sherley Herrmann PA-C, personally saw the patient today and spent greater than 30 minutes discharging this patient.     Sherley Herrmann PA-C  [facility]  ______________________________________________________________________    Physical Exam   Vital Signs: Temp: 98.2  F (36.8  C) Temp  src: Oral BP: 137/71   Heart Rate: 85 Resp: 18 SpO2: 97 % O2 Device: None (Room air)    Weight: 208 lbs 5 oz    CONSTITUTIONAL: Pt laying in bed, dressed in hospital garb. Appears comfortable. Cooperative with interview.  HEENT: Normocephalic, atraumatic. Negative for conjunctival redness or scleral icterus.    CARDIOVASCULAR: RRR, no murmurs, rubs, or extra heart sounds appreciated. Pulses +2/4 and regular in upper and lower extremities, bilaterally.   RESPIRATORY: No increased work of breathing.  CTA, bilat; no wheezes, rales, or rhonchi appreciated.  GASTROINTESTINAL:  Abdomen soft, non-distended. BS auscultated in all four quadrants. Negative for tenderness to palpation.  No masses or organomegaly noted.  MUSCULOSKELETAL: No gross deformities noted. Normal muscle tone.   HEMATOLOGIC/LYMPHATIC/IMMUNOLOGIC: Negative for lower extremity edema, bilaterally.  NEUROLOGIC: Alert and oriented to person, place, and time.  strength intact. No focal neuro deficits.   SKIN: Warm, dry, intact. No jaundice noted. Negative for suspicious lesions, rashes, bruising, open sores or abrasions.        Primary Care Physician   Jacob Graham MD    Discharge Orders      Reason for your hospital stay    You were hospitalized for further evaluation of fevers, poor appetite, and abdominal pain, found to have extensive clot within your abdominal venous system. You were started on blood thinning agents and seen by Hematology and Vascular Surgery. You were also found to have a urinary tract infection for which you were started on antibiotics.     Follow-up and recommended labs and tests     Follow up with primary care provider, Jacob Graham MD, within 7 days for hospital follow-up.  No follow up labs or test are needed.  Sufficient to call the clinic and discuss recent hospitalization over telephone.     Follow-up with Hematology in 6-8 weeks; our Care Coordinator helped to facilitate establishing this appointment.      Activity    Your activity upon discharge: activity as tolerated     Discharge Instructions    1) Continue Eliquis 10 mg twice daily X7 days followed by 5 mg twice daily thereafter. Continue for minimum 3-6 months, final decision on duration will be determined by Hematology at follow-up upon reviewing your labs to rule out genetic cause for clotting. Please obtain future refills of Eliquis through your primary care provider   2) Stop aspirin daily while on Eliquis, can resume once off Eliquis   3) Levaquin 500 mg daily X5 more days for treatment of UTI   4) No additional changes to medication regimen  5) Continue with regular diet   6) Return to the ER with worsening symptoms     Full Code     Diet    Follow this diet upon discharge: Orders Placed This Encounter      Advance Diet as Tolerated: Regular Diet Adult       Significant Results and Procedures   Most Recent 3 CBC's:  Recent Labs   Lab Test 03/30/20  0835 03/29/20  0625 03/28/20  0623 03/27/20  2105   WBC  --  7.4 9.2 9.4   HGB 12.2* 12.1* 12.7* 13.1*   MCV  --  94 95 96   PLT  --  388 366 389     Most Recent 3 BMP's:  Recent Labs   Lab Test 03/30/20  0835 03/29/20  0625 03/28/20  0623    138 136   POTASSIUM 4.0 4.1 4.3   CHLORIDE 108 108 105   CO2 26 25 27   BUN 14 10 12   CR 1.09 0.93 0.93   ANIONGAP 4 5 4   ARMANI 8.9 8.6 8.4*   * 108* 105*     Most Recent 2 LFT's:  Recent Labs   Lab Test 03/30/20  0835 03/29/20  0625   AST 40 45   ALT 81* 89*   ALKPHOS 117 123   BILITOTAL 1.0 1.1   ,   Results for orders placed or performed during the hospital encounter of 03/27/20   CT Abdomen Pelvis w Contrast    Narrative    EXAM: CT ABDOMEN PELVIS W CONTRAST  LOCATION: [facility]  DATE/TIME: 3/27/2020 9:50 PM    INDICATION: Abd infection (incl peritonitis) pain.  COMPARISON: None.  TECHNIQUE: CT scan of the abdomen and pelvis was performed following injection of IV contrast. Multiplanar reformats were obtained. Dose reduction techniques  were used.  CONTRAST: 106 mL Isovue-370    FINDINGS:   LOWER CHEST: Normal.    HEPATOBILIARY: Fatty liver. Occlusive thrombus within the right portal vein inferior segments with nonocclusive thrombus within the splenic vein near the portal splenic confluence extending into the confluence.    PANCREAS: Normal.    SPLEEN: Normal.    ADRENAL GLANDS: Normal.    KIDNEYS/BLADDER: Small nonobstructing stone right kidney. Simple cysts in the kidneys.    BOWEL: Occlusive thrombus within the inferior mesenteric vein with mesenteric congestion within the left side of the abdomen extending into the pelvis into the sigmoid mesocolon. Occlusive thrombus extends also to involve the medial aspect of the   descending colon where there is some slight inflammatory change.    LYMPH NODES: Normal.    VASCULATURE: Unremarkable.    PELVIC ORGANS: Normal.    MUSCULOSKELETAL: Normal.      Impression    IMPRESSION:   1.  Occlusive thrombus involving the inferior mesenteric vein including sigmoid veins, middle and left colic veins with stranding within the left side of the mesentery extending into the pelvis. In addition there is nonocclusive thrombus at the portal   splenic confluence with occlusive thrombosis of the inferior right portal venous system.    2.  Colonic diverticula but no CT evidence for diverticulitis.    3.  Small nonobstructing stone right kidney.    4.  Cholecystectomy.    5.  Results discussed with Dr. Dowling     XR Chest Port 1 View    Narrative    EXAM: XR CHEST PORT 1 VW  LOCATION: [facility]  DATE/TIME: 3/27/2020 11:02 PM    INDICATION: Pain and fever  COMPARISON: None      Impression    IMPRESSION: Clear lungs. No pneumothorax or pleural effusion. Normal heart size and mediastinal contours. Small ossification projecting adjacent to the right humeral neck medially is probably a loose body in the glenohumeral recess. There are some   degenerative changes of the right shoulder.             Discharge  Medications   Current Discharge Medication List      START taking these medications    Details   apixaban ANTICOAGULANT (ELIQUIS STARTER PACK) 5 MG tablet Take 2 tablets (10 mg) by mouth 2 times daily for 7 days, THEN 1 tablet (5 mg) 2 times daily for 23 days.  Qty: 74 tablet, Refills: 0    Comments: Future refills by PCP Dr. Jacob Graham MD with phone number [phone].  Associated Diagnoses: Mesenteric vein thrombosis; Portal vein thrombosis      levofloxacin (LEVAQUIN) 500 MG tablet Take 1 tablet (500 mg) by mouth daily for 5 days  Qty: 5 tablet, Refills: 0    Associated Diagnoses: E. coli UTI         CONTINUE these medications which have NOT CHANGED    Details   amLODIPine (NORVASC) 5 MG tablet Take 1 tablet (5 mg) by mouth daily  Qty: 90 tablet, Refills: 3    Associated Diagnoses: Essential hypertension, benign      atorvastatin (LIPITOR) 40 MG tablet Take 1 tablet (40 mg) by mouth daily  Qty: 90 tablet, Refills: 3    Associated Diagnoses: Hyperlipidemia LDL goal <100         STOP taking these medications       aspirin 81 MG tablet Comments:   Reason for Stopping:             Allergies   Allergies   Allergen Reactions     Dilaudid [Hydromorphone]      Agitation, did not help with pain     Morphine Anxiety

## 2020-03-30 NOTE — PLAN OF CARE
A&O, VSS on RA, LS clear, BS hypoactive, passing gas, On low fiber diet, tolerating it well, continent of both B and B, denies pain and nausea, independent in transfers, shifted to oral Anti biotics, and Anti coagulant. For possible discharge tomorrow to home.

## 2020-03-31 ENCOUNTER — TELEPHONE (OUTPATIENT)
Dept: FAMILY MEDICINE | Facility: CLINIC | Age: 65
End: 2020-03-31

## 2020-03-31 LAB — LA PPP-IMP: NEGATIVE

## 2020-03-31 NOTE — TELEPHONE ENCOUNTER
ED / Discharge Outreach Protocol    Patient Contact    Attempt # 1    Was call answered?  No.  Left message on voicemail with information to call me back.    Radha DUNAWAY RN    Follow-ups Needed After Discharge     Follow-up Appointments     Follow-up and recommended labs and tests       Follow up with primary care provider, Jacob Graham MD, within   7 days for hospital follow-up.  No follow up labs or test are needed.    Sufficient to call the clinic and discuss recent hospitalization over   telephone.      Follow-up with Hematology in 6-8 weeks; our Care Coordinator helped to   facilitate establishing this appointment.       START taking these medications     Details   apixaban ANTICOAGULANT (ELIQUIS STARTER PACK) 5 MG tablet Take 2 tablets (10 mg) by mouth 2 times daily for 7 days, THEN 1 tablet (5 mg) 2 times daily for 23 days.  Qty: 74 tablet, Refills: 0     Comments: Future refills by PCP Dr. Jacob Graham MD with phone number [phone].  Associated Diagnoses: Mesenteric vein thrombosis; Portal vein thrombosis       levofloxacin (LEVAQUIN) 500 MG tablet Take 1 tablet (500 mg) by mouth daily for 5 days  Qty: 5 tablet, Refills: 0     Associated Diagnoses: E. coli UTI

## 2020-03-31 NOTE — TELEPHONE ENCOUNTER
"ED/Discharge Protocol    \"Hi, my name is Radha Levy RN, a registered nurse, and I am calling on behalf of Dr. Graham's office at [place].  I am calling to follow up and see how things are going for you after your recent visit.\"    \"I see that you were in the (ER/UC/IP) on 3/27/2020-3/30/2020.    How are you doing now that you are home?\" patient states he's doing well    Is patient experiencing symptoms that may require a hospital visit?  No    Discharge Instructions    \"Let's review your discharge instructions.  What is/are the follow-up recommendations?  Pt. Response: F/U with PCP    \"Were you instructed to make a follow-up appointment?\"  Pt. Response: Yes.  Has appointment been made?   Yes      \"When you see the provider, I would recommend that you bring your discharge instructions with you.    Medications    \"How many new medications are you on since your hospitalization/ED visit?\"    0-1  \"How many of your current medicines changed (dose, timing, name, etc.) while you were in the hospital/ED visit?\"   0-1  \"Do you have questions about your medications?\"   No  \"Were you newly diagnosed with heart failure, COPD, diabetes or did you have a heart attack?\"   No  For patients on insulin: \"Did you start on insulin in the hospital or did you have your insulin dose changed?\"   No  Post Discharge Medication Reconciliation Status: discharge medications reconciled, continue medications without change.    Was MTM referral placed (*Make sure to put transitions as reason for referral)?   No    Call Summary    \"Do you have any questions or concerns about your condition or care plan at the moment?\"    No  Triage nurse advice given: keep f/u with PCP    Patient was in ER once in the past year (assess appropriateness of ER visits.)      \"If you have questions or things don't continue to improve, we encourage you contact us through the main clinic number,  [phone].  Even if the clinic is not open, triage nurses are " "available 24/7 to help you.     We would like you to know that our clinic has extended hours (provide information).  We also have urgent care (provide details on closest location and hours/contact info)\"      \"Thank you for your time and take care!\"    Radha DUNAWAY RN        "

## 2020-04-01 ENCOUNTER — VIRTUAL VISIT (OUTPATIENT)
Dept: FAMILY MEDICINE | Facility: CLINIC | Age: 65
End: 2020-04-01
Payer: COMMERCIAL

## 2020-04-01 DIAGNOSIS — K55.069 INFERIOR MESENTERIC VEIN THROMBOSIS (H): Primary | ICD-10-CM

## 2020-04-01 PROCEDURE — 99213 OFFICE O/P EST LOW 20 MIN: CPT | Mod: TEL | Performed by: INTERNAL MEDICINE

## 2020-04-01 NOTE — PROGRESS NOTES
"Billie Aden is a 64 year old male who is being evaluated via a telephone visit.      The patient has been notified of following (by PRAKASH BARFIELD CMA     \"We have found that certain health care needs can be provided without the need for a physical exam.  This service lets us provide the care you need with a short phone conversation.  If a prescription is necessary we can send it directly to your pharmacy.  If lab work is needed we can place an order for that and you can then stop by our lab to have the test done at a later time.    This telephone visit will be conducted via 3 way call with the you (the patient) , the physician/provider, and a me all on the line at the same time.  This allows your physician/provider to have the phone conversation with you while I will be taking notes for your medical record.  We will have full access to your [facility] medical record during this entire phone call.    Since this is like an office visit,  will bill your insurance company for this service.  Please check with your medical insurance if this type of telephone/virtual is covered . You may be responsible for the cost of this service if insurance coverage is denied.  The typical cost is $30 (10min), $59(11-20min) and $85 (21-30min)     If during the course of the call the physician/provider feels a telephone visit is not appropriate, you will not be charged for this service\"    Consent has been obtained for this service by care team member: yes.  See the scanned image in the medical record.    S: The history as provided by the patient to the provider during this 3 way call include     Pertinent parts of the the patient's medical history reviewed and confirmed by the provider included :     I called and spoke with Billie with regards to his concern for recent hospitalization and elevated liver function tests in the setting of inferior mesenteric, splenic, and portal venous thrombosis.  His labs essentially were back to normal " on time of discharge.  Except for slight elevation of his liver function tests.  His abdominal pain has improved significantly.  He is having normal bowel movements.  He was seen in consultation with hematology and pharmacy and was recommended to continue with the novel anticoagulant, apixaban, which he will take for the next likely 6 months.  He will see hematology in follow-up in June.  He is comfortable with this plan.  He was very appreciative of the care that he received.    Total time of call between patient and provider was 7 minutes     Jacob Graham MD, MD  (MD signature)  ===================================================    I have reviewed the note as documented above.  This accurately captures the substance of my conversation with the patient,    Additional provider notes:    Assessment/Plan:  No diagnosis found.

## 2020-04-03 LAB
BACTERIA SPEC CULT: NO GROWTH
BACTERIA SPEC CULT: NO GROWTH
SPECIMEN SOURCE: NORMAL
SPECIMEN SOURCE: NORMAL

## 2020-04-06 LAB
COPATH REPORT: NORMAL
COPATH REPORT: NORMAL

## 2020-04-07 LAB — COPATH REPORT: NORMAL

## 2020-05-27 ENCOUNTER — VIRTUAL VISIT (OUTPATIENT)
Dept: ONCOLOGY | Facility: CLINIC | Age: 65
End: 2020-05-27
Attending: INTERNAL MEDICINE
Payer: COMMERCIAL

## 2020-05-27 DIAGNOSIS — K55.069 MESENTERIC VEIN THROMBOSIS (H): Primary | ICD-10-CM

## 2020-05-27 PROCEDURE — 99214 OFFICE O/P EST MOD 30 MIN: CPT | Mod: 95 | Performed by: INTERNAL MEDICINE

## 2020-05-27 PROCEDURE — 40001009 ZZH VIDEO/TELEPHONE VISIT; NO CHARGE

## 2020-05-27 NOTE — LETTER
"    5/27/2020         RE: Billie Aden  [address] [facility] [address]        Dear Colleague,    Thank you for referring your patient, Billie Aden, to the [facility]. Please see a copy of my visit note below.    Billie Aden is a 64 year old male who is being evaluated via a billable video visit.      The patient has been notified of following:     \"This video visit will be conducted via a call between you and your physician/provider. We have found that certain health care needs can be provided without the need for an in-person physical exam.  This service lets us provide the care you need with a video conversation.  If a prescription is necessary we can send it directly to your pharmacy.  If lab work is needed we can place an order for that and you can then stop by our lab to have the test done at a later time.    Video visits are billed at different rates depending on your insurance coverage.  Please reach out to your insurance provider with any questions.    If during the course of the call the physician/provider feels a video visit is not appropriate, you will not be charged for this service.\"    Patient has given verbal consent for Video visit? Yes    How would you like to obtain your AVS? MyChart    Patient would like the video invitation sent by: Send to e-mail at: [email or web address]    Will anyone else be joining your video visit? No      Video-Visit Details    Type of service:  Video Visit    Video Start Time: 1:37 pm  Video End Time: 1:45 pm    Originating Location (pt. Location): Home    Distant Location (provider location):  [facility]     Platform used for Video Visit: AmWell   Medications and Allergies were reviewed , no refills needed at this time. No pain.    Jerica Kelley [facility]    Hematology/Oncology Established Patient Follow-up Note      Today's Date: 05/27/20    Reason for Follow-up: mesenteric vein " thrombosis    HISTORY OF PRESENT ILLNESS: Billie Aden is a 64 year old male who was hospitalized in March 2020 with abdominal pain and fever, and found to have occlusive thrombus involving the inferior mesenteric vein including sigmoid veins, middle and left colic veins.  There is also non-occlusive thrombus at the portal splenic confluence with occlusive thrombosis of the inferior right portal venous system.  He did not have cough or shortness of breath or lower extremity edema.  He was also found to UTI and started on antibiotics.  He denies family history of blood clots.  He does not smoke.  He denies recent travel.      There was no evidence of malignancy on the CT scan.  He has fatty liver, but no imaging evidence of cirrhosis.  He denies personal or family history of blood clots.  He did have a UTI, so infection and inflammation may have been a possible cause.      Vascular surgery saw the patient, and no surgical intervention was planned.  He had a hypercoagulable work-up done, which was negative.      He was on heparin drip and then transitioned to Eliquis on discharge.      INTERIM HISTORY: Patient says that he is doing very well.  He continues to take Eliquis without problems.  He denies any unusual bleeding or bruising symptoms.  His abdominal pain has resolved completely.        REVIEW OF SYSTEMS:   14 point ROS was reviewed and is negative other than as noted above in HPI.       HOME MEDICATIONS:  Current Outpatient Medications   Medication Sig Dispense Refill     amLODIPine (NORVASC) 5 MG tablet Take 1 tablet (5 mg) by mouth daily 90 tablet 3     apixaban ANTICOAGULANT (ELIQUIS) 5 MG tablet Take 1 tablet (5 mg) by mouth 2 times daily 60 tablet 4     atorvastatin (LIPITOR) 40 MG tablet Take 1 tablet (40 mg) by mouth daily 90 tablet 3         ALLERGIES:  Allergies   Allergen Reactions     Dilaudid [Hydromorphone]      Agitation, did not help with pain     Morphine Anxiety         PAST MEDICAL  HISTORY:  Past Medical History:   Diagnosis Date     Arthritis      BCC (basal cell carcinoma of skin) 11/2017    left lower lid     Cholelithiasis 10/8/2013     Heart disease      Hypertension 08/2018     Nephrolithiasis      Pelvic fracture (H) 11/2014    occured while horseback riding         PAST SURGICAL HISTORY:  Past Surgical History:   Procedure Laterality Date     COLONOSCOPY       HC CLOSED TX SHOULDER DISLOC W MANIP NO ANESTH       LAPAROSCOPIC CHOLECYSTECTOMY N/A 12/23/2014    Surgeon: Avelino Bryson MD;  [facility]:  [facility]     REPAIR MOHS Left 1/25/2018    Procedure: REPAIR MOHS;  Left Lower Eyelid Mohs Reconstruction;  Surgeon: Claudia Melvin MD;  Location:  OR         SOCIAL HISTORY:  Social History     Socioeconomic History     Marital status:      Spouse name: Not on file     Number of children: 2     Years of education: Not on file     Highest education level: Not on file   Occupational History     Occupation: [employer]   Social Needs     Financial resource strain: Not on file     Food insecurity     Worry: Not on file     Inability: Not on file     Transportation needs     Medical: Not on file     Non-medical: Not on file   Tobacco Use     Smoking status: Never Smoker     Smokeless tobacco: Never Used   Substance and Sexual Activity     Alcohol use: Yes     Alcohol/week: 0.0 standard drinks     Comment: occasionally     Drug use: No     Sexual activity: Not Currently     Partners: Female   Lifestyle     Physical activity     Days per week: Not on file     Minutes per session: Not on file     Stress: Not on file   Relationships     Social connections     Talks on phone: Not on file     Gets together: Not on file     Attends Bahai service: Not on file     Active member of club or organization: Not on file     Attends meetings of clubs or organizations: Not on file     Relationship status: Not on file     Intimate partner violence     Fear of current or ex partner: Not on file      Emotionally abused: Not on file     Physically abused: Not on file     Forced sexual activity: Not on file   Other Topics Concern     Parent/sibling w/ CABG, MI or angioplasty before 65F 55M? No   Social History Narrative    , 2 children    Self Employed - semi-retired         FAMILY HISTORY:  Family History   Problem Relation Age of Onset     Cancer - colorectal Mother 79     Cardiovascular Father 70        Had a carotid endarterectomy at age 70 years.  Tobacco user.     Heart Surgery Brother 66        Alive. Had CABG at age 66 years. Smoker     Diabetes Type 2  Brother      Melanoma Brother          PHYSICAL EXAM:  Vital signs:  There were no vitals taken for this visit.   ECO  GENERAL/CONSTITUTIONAL: No acute distress. Healthy, alert.  EYES: No scleral icterus.  No redness or discharge.    RESPIRATORY: No audible wheeze, cough, or visible cyanosis.  No visible retractions or increased work of breathing.  Able to speak fully in complete sentences.  MUSCULOSKELETAL: Normal range of motion.  NEUROLOGIC: Alert, oriented, answers questions appropriately. No tremor. Mentation intact and speech normal  INTEGUMENTARY: No jaundice.  No obvious rash or skin lesions.  PSYCHIATRIC:  Mentation appears normal, affect normal/bright, judgement and insight intact, normal speech and appearance well-groomed.    The rest of a comprehensive physical exam is deferred due to public health emergency video visit restrictions.    IMAGING:  CT abdomen/pelvis 3/27/20:  1.  Occlusive thrombus involving the inferior mesenteric vein including sigmoid veins, middle and left colic veins with stranding within the left side of the mesentery extending into the pelvis. In addition there is nonocclusive thrombus at the portal   splenic confluence with occlusive thrombosis of the inferior right portal venous system.     2.  Colonic diverticula but no CT evidence for diverticulitis.     3.  Small nonobstructing stone right kidney.     4.   Cholecystectomy.      ASSESSMENT/PLAN:  Billie Aden is a 64 year old male with:    1) Inferior mesenteric, splenic, portal venous thrombosis: Unclear etiology.  Hypercoagulable evaluation was negative, including factor II, factor V Leiden, protein C, protein S, antithrombin III, lupus anticoagulant, PNH, and myeloproliferative panel, including JAK2.  His symptoms have completely resolved.    -he will complete 6 months of anticoagulation.  He is on Eliquis and tolerating well.  He will complete it at the end of September 2020.  -if he gets another clot, he will need to be on anticoagulation for life  -age appropriate cancer screening - following with PCP  -he says that he is comfortable following up with his PCP, and will follow-up in the hematology clinic as needed      Luann Wagner MD  Hematology/Oncology  [facility]          Again, thank you for allowing me to participate in the care of your patient.        Sincerely,        Luann Wagner MD

## 2020-05-27 NOTE — LETTER
"    5/27/2020         RE: Billie Aden  [address] [facility] [address]        Dear Colleague,    Thank you for referring your patient, Billie Aden, to the [facility]. Please see a copy of my visit note below.    Billie Aden is a 64 year old male who is being evaluated via a billable video visit.      The patient has been notified of following:     \"This video visit will be conducted via a call between you and your physician/provider. We have found that certain health care needs can be provided without the need for an in-person physical exam.  This service lets us provide the care you need with a video conversation.  If a prescription is necessary we can send it directly to your pharmacy.  If lab work is needed we can place an order for that and you can then stop by our lab to have the test done at a later time.    Video visits are billed at different rates depending on your insurance coverage.  Please reach out to your insurance provider with any questions.    If during the course of the call the physician/provider feels a video visit is not appropriate, you will not be charged for this service.\"    Patient has given verbal consent for Video visit? Yes    How would you like to obtain your AVS? MyChart    Patient would like the video invitation sent by: Send to e-mail at: [email or web address]    Will anyone else be joining your video visit? No      Video-Visit Details    Type of service:  Video Visit    Video Start Time: 1:37 pm  Video End Time: 1:45 pm    Originating Location (pt. Location): Home    Distant Location (provider location):  [facility]     Platform used for Video Visit: AmWell   Medications and Allergies were reviewed , no refills needed at this time. No pain.    Jerica Kelley [facility]    Hematology/Oncology Established Patient Follow-up Note      Today's Date: 05/27/20    Reason for Follow-up: mesenteric vein " thrombosis    HISTORY OF PRESENT ILLNESS: Billie Aden is a 64 year old male who was hospitalized in March 2020 with abdominal pain and fever, and found to have occlusive thrombus involving the inferior mesenteric vein including sigmoid veins, middle and left colic veins.  There is also non-occlusive thrombus at the portal splenic confluence with occlusive thrombosis of the inferior right portal venous system.  He did not have cough or shortness of breath or lower extremity edema.  He was also found to UTI and started on antibiotics.  He denies family history of blood clots.  He does not smoke.  He denies recent travel.      There was no evidence of malignancy on the CT scan.  He has fatty liver, but no imaging evidence of cirrhosis.  He denies personal or family history of blood clots.  He did have a UTI, so infection and inflammation may have been a possible cause.      Vascular surgery saw the patient, and no surgical intervention was planned.  He had a hypercoagulable work-up done, which was negative.      He was on heparin drip and then transitioned to Eliquis on discharge.      INTERIM HISTORY: Patient says that he is doing very well.  He continues to take Eliquis without problems.  He denies any unusual bleeding or bruising symptoms.  His abdominal pain has resolved completely.        REVIEW OF SYSTEMS:   14 point ROS was reviewed and is negative other than as noted above in HPI.       HOME MEDICATIONS:  Current Outpatient Medications   Medication Sig Dispense Refill     amLODIPine (NORVASC) 5 MG tablet Take 1 tablet (5 mg) by mouth daily 90 tablet 3     apixaban ANTICOAGULANT (ELIQUIS) 5 MG tablet Take 1 tablet (5 mg) by mouth 2 times daily 60 tablet 4     atorvastatin (LIPITOR) 40 MG tablet Take 1 tablet (40 mg) by mouth daily 90 tablet 3         ALLERGIES:  Allergies   Allergen Reactions     Dilaudid [Hydromorphone]      Agitation, did not help with pain     Morphine Anxiety         PAST MEDICAL  HISTORY:  Past Medical History:   Diagnosis Date     Arthritis      BCC (basal cell carcinoma of skin) 11/2017    left lower lid     Cholelithiasis 10/8/2013     Heart disease      Hypertension 08/2018     Nephrolithiasis      Pelvic fracture (H) 11/2014    occured while horseback riding         PAST SURGICAL HISTORY:  Past Surgical History:   Procedure Laterality Date     COLONOSCOPY       HC CLOSED TX SHOULDER DISLOC W MANIP NO ANESTH       LAPAROSCOPIC CHOLECYSTECTOMY N/A 12/23/2014    Surgeon: Avelino Bryson MD;  [facility]:  [facility]     REPAIR MOHS Left 1/25/2018    Procedure: REPAIR MOHS;  Left Lower Eyelid Mohs Reconstruction;  Surgeon: Claudia Melvin MD;  Location:  OR         SOCIAL HISTORY:  Social History     Socioeconomic History     Marital status:      Spouse name: Not on file     Number of children: 2     Years of education: Not on file     Highest education level: Not on file   Occupational History     Occupation: [email or web address]   Social Needs     Financial resource strain: Not on file     Food insecurity     Worry: Not on file     Inability: Not on file     Transportation needs     Medical: Not on file     Non-medical: Not on file   Tobacco Use     Smoking status: Never Smoker     Smokeless tobacco: Never Used   Substance and Sexual Activity     Alcohol use: Yes     Alcohol/week: 0.0 standard drinks     Comment: occasionally     Drug use: No     Sexual activity: Not Currently     Partners: Female   Lifestyle     Physical activity     Days per week: Not on file     Minutes per session: Not on file     Stress: Not on file   Relationships     Social connections     Talks on phone: Not on file     Gets together: Not on file     Attends Bahai service: Not on file     Active member of club or organization: Not on file     Attends meetings of clubs or organizations: Not on file     Relationship status: Not on file     Intimate partner violence     Fear of current or ex partner: Not on file      Emotionally abused: Not on file     Physically abused: Not on file     Forced sexual activity: Not on file   Other Topics Concern     Parent/sibling w/ CABG, MI or angioplasty before 65F 55M? No   Social History Narrative    , 2 children    Self Employed - semi-retired         FAMILY HISTORY:  Family History   Problem Relation Age of Onset     Cancer - colorectal Mother 79     Cardiovascular Father 70        Had a carotid endarterectomy at age 70 years.  Tobacco user.     Heart Surgery Brother 66        Alive. Had CABG at age 66 years. Smoker     Diabetes Type 2  Brother      Melanoma Brother          PHYSICAL EXAM:  Vital signs:  There were no vitals taken for this visit.   ECO  GENERAL/CONSTITUTIONAL: No acute distress. Healthy, alert.  EYES: No scleral icterus.  No redness or discharge.    RESPIRATORY: No audible wheeze, cough, or visible cyanosis.  No visible retractions or increased work of breathing.  Able to speak fully in complete sentences.  MUSCULOSKELETAL: Normal range of motion.  NEUROLOGIC: Alert, oriented, answers questions appropriately. No tremor. Mentation intact and speech normal  INTEGUMENTARY: No jaundice.  No obvious rash or skin lesions.  PSYCHIATRIC:  Mentation appears normal, affect normal/bright, judgement and insight intact, normal speech and appearance well-groomed.    The rest of a comprehensive physical exam is deferred due to public health emergency video visit restrictions.    IMAGING:  CT abdomen/pelvis 3/27/20:  1.  Occlusive thrombus involving the inferior mesenteric vein including sigmoid veins, middle and left colic veins with stranding within the left side of the mesentery extending into the pelvis. In addition there is nonocclusive thrombus at the portal   splenic confluence with occlusive thrombosis of the inferior right portal venous system.     2.  Colonic diverticula but no CT evidence for diverticulitis.     3.  Small nonobstructing stone right kidney.     4.   Cholecystectomy.      ASSESSMENT/PLAN:  Billie Aden is a 64 year old male with:    1) Inferior mesenteric, splenic, portal venous thrombosis: Unclear etiology.  Hypercoagulable evaluation was negative, including factor II, factor V Leiden, protein C, protein S, antithrombin III, lupus anticoagulant, PNH, and myeloproliferative panel, including JAK2.  His symptoms have completely resolved.    -he will complete 6 months of anticoagulation.  He is on Eliquis and tolerating well.  He will complete it at the end of September 2020.  -if he gets another clot, he will need to be on anticoagulation for life  -age appropriate cancer screening - following with PCP  -he says that he is comfortable following up with his PCP, and will follow-up in the hematology clinic as needed      Luann Wagner MD  Hematology/Oncology  [facility]          Again, thank you for allowing me to participate in the care of your patient.        Sincerely,        Luann Wagner MD

## 2020-05-27 NOTE — PROGRESS NOTES
"Billie Aden is a 64 year old male who is being evaluated via a billable video visit.      The patient has been notified of following:     \"This video visit will be conducted via a call between you and your physician/provider. We have found that certain health care needs can be provided without the need for an in-person physical exam.  This service lets us provide the care you need with a video conversation.  If a prescription is necessary we can send it directly to your pharmacy.  If lab work is needed we can place an order for that and you can then stop by our lab to have the test done at a later time.    Video visits are billed at different rates depending on your insurance coverage.  Please reach out to your insurance provider with any questions.    If during the course of the call the physician/provider feels a video visit is not appropriate, you will not be charged for this service.\"    Patient has given verbal consent for Video visit? Yes    How would you like to obtain your AVS? Abena    Patient would like the video invitation sent by: Send to e-mail at: [email or web address]    Will anyone else be joining your video visit? No      Video-Visit Details    Type of service:  Video Visit    Video Start Time: 1:37 pm  Video End Time: 1:45 pm    Originating Location (pt. Location): Home    Distant Location (provider location):  [facility]     Platform used for Video Visit: AmWell   Medications and Allergies were reviewed , no refills needed at this time. No pain.    Jerica Kelley, [facility]    Hematology/Oncology Established Patient Follow-up Note      Today's Date: 05/27/20    Reason for Follow-up: mesenteric vein thrombosis    HISTORY OF PRESENT ILLNESS: Billie Aden is a 64 year old male who was hospitalized in March 2020 with abdominal pain and fever, and found to have occlusive thrombus involving the inferior mesenteric vein including sigmoid veins, middle and " left colic veins.  There is also non-occlusive thrombus at the portal splenic confluence with occlusive thrombosis of the inferior right portal venous system.  He did not have cough or shortness of breath or lower extremity edema.  He was also found to UTI and started on antibiotics.  He denies family history of blood clots.  He does not smoke.  He denies recent travel.      There was no evidence of malignancy on the CT scan.  He has fatty liver, but no imaging evidence of cirrhosis.  He denies personal or family history of blood clots.  He did have a UTI, so infection and inflammation may have been a possible cause.      Vascular surgery saw the patient, and no surgical intervention was planned.  He had a hypercoagulable work-up done, which was negative.      He was on heparin drip and then transitioned to Eliquis on discharge.      INTERIM HISTORY: Patient says that he is doing very well.  He continues to take Eliquis without problems.  He denies any unusual bleeding or bruising symptoms.  His abdominal pain has resolved completely.        REVIEW OF SYSTEMS:   14 point ROS was reviewed and is negative other than as noted above in HPI.       HOME MEDICATIONS:  Current Outpatient Medications   Medication Sig Dispense Refill     amLODIPine (NORVASC) 5 MG tablet Take 1 tablet (5 mg) by mouth daily 90 tablet 3     apixaban ANTICOAGULANT (ELIQUIS) 5 MG tablet Take 1 tablet (5 mg) by mouth 2 times daily 60 tablet 4     atorvastatin (LIPITOR) 40 MG tablet Take 1 tablet (40 mg) by mouth daily 90 tablet 3         ALLERGIES:  Allergies   Allergen Reactions     Dilaudid [Hydromorphone]      Agitation, did not help with pain     Morphine Anxiety         PAST MEDICAL HISTORY:  Past Medical History:   Diagnosis Date     Arthritis      BCC (basal cell carcinoma of skin) 11/2017    left lower lid     Cholelithiasis 10/8/2013     Heart disease      Hypertension 08/2018     Nephrolithiasis      Pelvic fracture (H) 11/2014    occured  while horseback riding         PAST SURGICAL HISTORY:  Past Surgical History:   Procedure Laterality Date     COLONOSCOPY       HC CLOSED TX SHOULDER DISLOC W MANIP NO ANESTH       LAPAROSCOPIC CHOLECYSTECTOMY N/A 12/23/2014    Surgeon: Avelino Bryson MD;  Location:  [facility]     REPAIR MOHS Left 1/25/2018    Procedure: REPAIR MOHS;  Left Lower Eyelid Mohs Reconstruction;  Surgeon: Claudia Melvin MD;  Location:  OR         SOCIAL HISTORY:  Social History     Socioeconomic History     Marital status:      Spouse name: Not on file     Number of children: 2     Years of education: Not on file     Highest education level: Not on file   Occupational History     Occupation: [employer]   Social Needs     Financial resource strain: Not on file     Food insecurity     Worry: Not on file     Inability: Not on file     Transportation needs     Medical: Not on file     Non-medical: Not on file   Tobacco Use     Smoking status: Never Smoker     Smokeless tobacco: Never Used   Substance and Sexual Activity     Alcohol use: Yes     Alcohol/week: 0.0 standard drinks     Comment: occasionally     Drug use: No     Sexual activity: Not Currently     Partners: Female   Lifestyle     Physical activity     Days per week: Not on file     Minutes per session: Not on file     Stress: Not on file   Relationships     Social connections     Talks on phone: Not on file     Gets together: Not on file     Attends Anglican service: Not on file     Active member of club or organization: Not on file     Attends meetings of clubs or organizations: Not on file     Relationship status: Not on file     Intimate partner violence     Fear of current or ex partner: Not on file     Emotionally abused: Not on file     Physically abused: Not on file     Forced sexual activity: Not on file   Other Topics Concern     Parent/sibling w/ CABG, MI or angioplasty before 65F 55M? No   Social History Narrative    , 2 children    Self Employed  - semi-retired         FAMILY HISTORY:  Family History   Problem Relation Age of Onset     Cancer - colorectal Mother 79     Cardiovascular Father 70        Had a carotid endarterectomy at age 70 years.  Tobacco user.     Heart Surgery Brother 66        Alive. Had CABG at age 66 years. Smoker     Diabetes Type 2  Brother      Melanoma Brother          PHYSICAL EXAM:  Vital signs:  There were no vitals taken for this visit.   ECO  GENERAL/CONSTITUTIONAL: No acute distress. Healthy, alert.  EYES: No scleral icterus.  No redness or discharge.    RESPIRATORY: No audible wheeze, cough, or visible cyanosis.  No visible retractions or increased work of breathing.  Able to speak fully in complete sentences.  MUSCULOSKELETAL: Normal range of motion.  NEUROLOGIC: Alert, oriented, answers questions appropriately. No tremor. Mentation intact and speech normal  INTEGUMENTARY: No jaundice.  No obvious rash or skin lesions.  PSYCHIATRIC:  Mentation appears normal, affect normal/bright, judgement and insight intact, normal speech and appearance well-groomed.    The rest of a comprehensive physical exam is deferred due to public health emergency video visit restrictions.    IMAGING:  CT abdomen/pelvis 3/27/20:  1.  Occlusive thrombus involving the inferior mesenteric vein including sigmoid veins, middle and left colic veins with stranding within the left side of the mesentery extending into the pelvis. In addition there is nonocclusive thrombus at the portal   splenic confluence with occlusive thrombosis of the inferior right portal venous system.     2.  Colonic diverticula but no CT evidence for diverticulitis.     3.  Small nonobstructing stone right kidney.     4.  Cholecystectomy.      ASSESSMENT/PLAN:  Billie Aden is a 64 year old male with:    1) Inferior mesenteric, splenic, portal venous thrombosis: Unclear etiology.  Hypercoagulable evaluation was negative, including factor II, factor V Leiden, protein C, protein S,  antithrombin III, lupus anticoagulant, PNH, and myeloproliferative panel, including JAK2.  His symptoms have completely resolved.    -he will complete 6 months of anticoagulation.  He is on Eliquis and tolerating well.  He will complete it at the end of September 2020.  -if he gets another clot, he will need to be on anticoagulation for life  -age appropriate cancer screening - following with PCP  -he says that he is comfortable following up with his PCP, and will follow-up in the hematology clinic as needed      Luann Wagner MD  Hematology/Oncology  [facility]

## 2020-07-20 DIAGNOSIS — E78.5 HYPERLIPIDEMIA LDL GOAL <100: ICD-10-CM

## 2020-07-21 RX ORDER — ATORVASTATIN CALCIUM 40 MG/1
40 TABLET, FILM COATED ORAL DAILY
Qty: 90 TABLET | Refills: 0 | Status: SHIPPED | OUTPATIENT
Start: 2020-07-21 | End: 2020-12-04

## 2020-07-21 NOTE — TELEPHONE ENCOUNTER
Due for fasting labs in near future  Prescription approved per [facility] according to the [facility] Refill Protocol.  Berenice Quintero RN on 7/21/2020 at 1:12 PM

## 2020-12-03 DIAGNOSIS — E78.5 HYPERLIPIDEMIA LDL GOAL <100: ICD-10-CM

## 2020-12-04 RX ORDER — ATORVASTATIN CALCIUM 40 MG/1
40 TABLET, FILM COATED ORAL DAILY
Qty: 90 TABLET | Refills: 3 | Status: SHIPPED | OUTPATIENT
Start: 2020-12-04 | End: 2021-02-03

## 2020-12-04 NOTE — TELEPHONE ENCOUNTER
Routing refill request to provider for review/approval because:  Labs not current:  BRADY ReedN, RN  [employer] T[facility]

## 2021-01-15 ENCOUNTER — HEALTH MAINTENANCE LETTER (OUTPATIENT)
Age: 66
End: 2021-01-15

## 2021-01-31 ASSESSMENT — ACTIVITIES OF DAILY LIVING (ADL): CURRENT_FUNCTION: NO ASSISTANCE NEEDED

## 2021-02-03 ENCOUNTER — OFFICE VISIT (OUTPATIENT)
Dept: FAMILY MEDICINE | Facility: CLINIC | Age: 66
End: 2021-02-03
Payer: COMMERCIAL

## 2021-02-03 DIAGNOSIS — E78.5 HYPERLIPIDEMIA LDL GOAL <100: ICD-10-CM

## 2021-02-03 DIAGNOSIS — I10 ESSENTIAL HYPERTENSION, BENIGN: ICD-10-CM

## 2021-02-03 DIAGNOSIS — Z00.00 ROUTINE GENERAL MEDICAL EXAMINATION AT A HEALTH CARE FACILITY: Primary | ICD-10-CM

## 2021-02-03 DIAGNOSIS — I25.10 CORONARY ARTERY CALCIFICATION: ICD-10-CM

## 2021-02-03 DIAGNOSIS — N18.30 STAGE 3 CHRONIC KIDNEY DISEASE, UNSPECIFIED WHETHER STAGE 3A OR 3B CKD (H): ICD-10-CM

## 2021-02-03 PROBLEM — K55.069: Status: RESOLVED | Noted: 2020-04-01 | Resolved: 2021-02-03

## 2021-02-03 PROBLEM — R10.9 ABDOMINAL PAIN: Status: RESOLVED | Noted: 2020-03-28 | Resolved: 2021-02-03

## 2021-02-03 PROCEDURE — G0438 PPPS, INITIAL VISIT: HCPCS | Performed by: INTERNAL MEDICINE

## 2021-02-03 RX ORDER — ASPIRIN 81 MG/1
81 TABLET ORAL DAILY
COMMUNITY
End: 2021-08-27

## 2021-02-03 RX ORDER — AMLODIPINE BESYLATE 5 MG/1
5 TABLET ORAL DAILY
Qty: 90 TABLET | Refills: 1 | Status: SHIPPED | OUTPATIENT
Start: 2021-02-03 | End: 2021-09-16

## 2021-02-03 RX ORDER — ATORVASTATIN CALCIUM 40 MG/1
40 TABLET, FILM COATED ORAL DAILY
Qty: 90 TABLET | Refills: 3 | Status: SHIPPED | OUTPATIENT
Start: 2021-02-03 | End: 2022-04-19

## 2021-02-03 ASSESSMENT — ACTIVITIES OF DAILY LIVING (ADL): CURRENT_FUNCTION: NO ASSISTANCE NEEDED

## 2021-02-03 ASSESSMENT — MIFFLIN-ST. JEOR: SCORE: 1800.05

## 2021-02-03 NOTE — PATIENT INSTRUCTIONS
(Z00.00) Routine general medical examination at a health care facility  (primary encounter diagnosis)  Comment: For routine exam, we will draw labs as ordered, cholesterol, diabetes mellitus check, liver function, renal function.  We will also update vaccination history.  PSA was discussed and deferred as per USPTF guidelines  Plan: CBC with platelets, Lipid panel reflex to         direct LDL Fasting, Comprehensive metabolic         panel            (I10) Essential hypertension, benign  Comment: blood pressure is elevated - but we will recheck before you leave  Plan: amLODIPine (NORVASC) 5 MG tablet            (E78.5) Hyperlipidemia LDL goal <100  Comment: check fasting lipid panel   Plan: atorvastatin (LIPITOR) 40 MG tablet            (I25.10,  I25.84) Coronary artery calcification  Comment: Doing well - continue statin and aspirin 81   Plan:     (N18.30) Stage 3 chronic kidney disease, unspecified whether stage 3a or 3b CKD  Comment: continue to monitor  Plan:

## 2021-02-03 NOTE — PROGRESS NOTES
"SUBJECTIVE:   Billie Aden is a 65 year old male who presents for Preventive Visit.      Patient has been advised of split billing requirements and indicates understanding: Yes   Are you in the first 12 months of your Medicare coverage?  No    Healthy Habits:     In general, how would you rate your overall health?  Excellent    Frequency of exercise:  2-3 days/week    Duration of exercise:  15-30 minutes    Do you usually eat at least 4 servings of fruit and vegetables a day, include whole grains    & fiber and avoid regularly eating high fat or \"junk\" foods?  Yes    Taking medications regularly:  Yes    Medication side effects:  Muscle aches    Ability to successfully perform activities of daily living:  No assistance needed    Home Safety:  No safety concerns identified    Hearing Impairment:  No hearing concerns    In the past 6 months, have you been bothered by leaking of urine?  No    In general, how would you rate your overall mental or emotional health?  Excellent      PHQ-2 Total Score: 0    Additional concerns today:  No    Do you feel safe in your environment? Yes    Have you ever done Advance Care Planning? (For example, a Health Directive, POLST, or a discussion with a medical provider or your loved ones about your wishes): Yes, patient states has an Advance Care Planning document and will bring a copy to the clinic.       Fall risk  Fallen 2 or more times in the past year?: No  Any fall with injury in the past year?: No    Cognitive Screening   1) Repeat 3 items (Leader, Season, Table)    2) Clock draw: NORMAL  3) 3 item recall: Recalls 1 object   Results: NORMAL clock, 1-2 items recalled: COGNITIVE IMPAIRMENT LESS LIKELY    Mini-CogTM Copyright DESTIN Juarez. Licensed by the author for use in [facility]; reprinted with permission (kevin@.[facility]). All rights reserved.      Do you have sleep apnea, excessive snoring or daytime drowsiness?: no    Reviewed and updated as needed this visit by " clinical staff                 Reviewed and updated as needed this visit by Provider                Social History     Tobacco Use     Smoking status: Never Smoker     Smokeless tobacco: Never Used   Substance Use Topics     Alcohol use: Yes     Alcohol/week: 0.0 standard drinks     Comment: occasionally     If you drink alcohol do you typically have >3 drinks per day or >7 drinks per week? No    Alcohol Use 1/31/2021   Prescreen: >3 drinks/day or >7 drinks/week? No   Prescreen: >3 drinks/day or >7 drinks/week? -               Current providers sharing in care for this patient include:   Patient Care Team:  Jacob Graham MD as PCP - General (Internal Medicine)  Jacob Graham MD as Assigned PCP  Luann Wagner MD as Assigned Cancer Care Provider    The following health maintenance items are reviewed in Epic and correct as of today:  Health Maintenance   Topic Date Due     MICROALBUMIN  10/03/2018     Pneumococcal Vaccine: 65+ Years (1 of 1 - PPSV23) 11/08/2019     MEDICARE ANNUAL WELLNESS VISIT  08/30/2020     LIPID  08/30/2020     BMP  03/30/2021     FALL RISK ASSESSMENT  04/01/2021     ADVANCE CARE PLANNING  06/13/2021     DTAP/TDAP/TD IMMUNIZATION (2 - Td) 09/20/2023     COLORECTAL CANCER SCREENING  10/29/2023     HEPATITIS C SCREENING  Completed     PHQ-2  Completed     INFLUENZA VACCINE  Completed     ZOSTER IMMUNIZATION  Completed     AORTIC ANEURYSM SCREENING (SYSTEM ASSIGNED)  Completed     Pneumococcal Vaccine: Pediatrics (0 to 5 Years) and At-Risk Patients (6 to 64 Years)  Aged Out     IPV IMMUNIZATION  Aged Out     MENINGITIS IMMUNIZATION  Aged Out     HEPATITIS B IMMUNIZATION  Aged Out     Lab work is in process  Labs reviewed in EPIC      Review of Systems  Constitutional, HEENT, cardiovascular, pulmonary, GI, , musculoskeletal, neuro, skin, endocrine and psych systems are negative, except as otherwise noted.    OBJECTIVE:   /88 (BP Location: Left arm, Patient  "Position: Sitting, Cuff Size: Adult Regular)   Pulse 77   Temp 96.9  F (36.1  C) (Temporal)   Ht 1.753 m (5' 9\")   Wt 103 kg (227 lb)   SpO2 98%   BMI 33.52 kg/m   Estimated body mass index is 33.52 kg/m  as calculated from the following:    Height as of this encounter: 1.753 m (5' 9\").    Weight as of this encounter: 103 kg (227 lb).  Physical Exam  GENERAL: healthy, alert and no distress  EYES: Eyes grossly normal to inspection, PERRL and conjunctivae and sclerae normal  HENT: ear canals and TM's normal, nose and mouth without ulcers or lesions  NECK: no adenopathy, no asymmetry, masses, or scars and thyroid normal to palpation  RESP: lungs clear to auscultation - no rales, rhonchi or wheezes  CV: regular rate and rhythm, normal S1 S2, no S3 or S4, no murmur, click or rub, no peripheral edema and peripheral pulses strong  ABDOMEN: soft, nontender, no hepatosplenomegaly, no masses and bowel sounds normal  RECTAL: normal sphincter tone, no rectal masses, prostate normal size, smooth, nontender without nodules or masses  MS: no gross musculoskeletal defects noted, no edema  SKIN: no suspicious lesions or rashes  NEURO: Normal strength and tone, mentation intact and speech normal  PSYCH: mentation appears normal, affect normal/bright    Diagnostic Test Results:  Labs reviewed in Epic    ASSESSMENT / PLAN:     Patient Instructions   (Z00.00) Routine general medical examination at a health care facility  (primary encounter diagnosis)  Comment: For routine exam, we will draw labs as ordered, cholesterol, diabetes mellitus check, liver function, renal function.  We will also update vaccination history.  PSA was discussed and deferred as per USPTF guidelines  Plan: CBC with platelets, Lipid panel reflex to         direct LDL Fasting, Comprehensive metabolic         panel            (I10) Essential hypertension, benign  Comment: blood pressure is elevated - but we will recheck before you leave  Plan: amLODIPine " "(NORVASC) 5 MG tablet            (E78.5) Hyperlipidemia LDL goal <100  Comment: check fasting lipid panel   Plan: atorvastatin (LIPITOR) 40 MG tablet            (I25.10,  I25.84) Coronary artery calcification  Comment: Doing well - continue statin and aspirin 81   Plan:     (N18.30) Stage 3 chronic kidney disease, unspecified whether stage 3a or 3b CKD  Comment: continue to monitor  Plan:           Patient has been advised of split billing requirements and indicates understanding: Yes  COUNSELING:  Reviewed preventive health counseling, as reflected in patient instructions    Estimated body mass index is 30.76 kg/m  as calculated from the following:    Height as of 3/28/20: 1.753 m (5' 9\").    Weight as of 3/30/20: 94.5 kg (208 lb 5 oz).        He reports that he has never smoked. He has never used smokeless tobacco.      Appropriate preventive services were discussed with this patient, including applicable screening as appropriate for cardiovascular disease, diabetes, osteopenia/osteoporosis, and glaucoma.  As appropriate for age/gender, discussed screening for colorectal cancer, prostate cancer, breast cancer, and cervical cancer. Checklist reviewing preventive services available has been given to the patient.    Reviewed patients plan of care and provided an AVS. The Basic Care Plan (routine screening as documented in Health Maintenance) for Billie meets the Care Plan requirement. This Care Plan has been established and reviewed with the Patient.    Counseling Resources:  ATP IV Guidelines  Pooled Cohorts Equation Calculator  Breast Cancer Risk Calculator  Breast Cancer: Medication to Reduce Risk  FRAX Risk Assessment  ICSI Preventive Guidelines  Dietary Guidelines for Americans, 2010  [employer]'s MyPlate  ASA Prophylaxis  Lung CA Screening    Jacob Graham MD, MD  [facility]    Identified Health Risks:  "

## 2021-02-08 VITALS
DIASTOLIC BLOOD PRESSURE: 88 MMHG | TEMPERATURE: 96.9 F | OXYGEN SATURATION: 98 % | WEIGHT: 227 LBS | SYSTOLIC BLOOD PRESSURE: 134 MMHG | HEIGHT: 69 IN | HEART RATE: 77 BPM | BODY MASS INDEX: 33.62 KG/M2

## 2021-03-18 DIAGNOSIS — Z00.00 ROUTINE GENERAL MEDICAL EXAMINATION AT A HEALTH CARE FACILITY: ICD-10-CM

## 2021-03-18 LAB
ALBUMIN SERPL-MCNC: 4 G/DL (ref 3.4–5)
ALP SERPL-CCNC: 76 U/L (ref 40–150)
ALT SERPL W P-5'-P-CCNC: 76 U/L (ref 0–70)
ANION GAP SERPL CALCULATED.3IONS-SCNC: 5 MMOL/L (ref 3–14)
AST SERPL W P-5'-P-CCNC: 47 U/L (ref 0–45)
BILIRUB SERPL-MCNC: 3.4 MG/DL (ref 0.2–1.3)
BUN SERPL-MCNC: 13 MG/DL (ref 7–30)
CALCIUM SERPL-MCNC: 9.1 MG/DL (ref 8.5–10.1)
CHLORIDE SERPL-SCNC: 105 MMOL/L (ref 94–109)
CHOLEST SERPL-MCNC: 128 MG/DL
CO2 SERPL-SCNC: 29 MMOL/L (ref 20–32)
CREAT SERPL-MCNC: 1.11 MG/DL (ref 0.66–1.25)
ERYTHROCYTE [DISTWIDTH] IN BLOOD BY AUTOMATED COUNT: 12.7 % (ref 10–15)
GFR SERPL CREATININE-BSD FRML MDRD: 69 ML/MIN/{1.73_M2}
GLUCOSE SERPL-MCNC: 125 MG/DL (ref 70–99)
HCT VFR BLD AUTO: 45.2 % (ref 40–53)
HDLC SERPL-MCNC: 45 MG/DL
HGB BLD-MCNC: 16 G/DL (ref 13.3–17.7)
LDLC SERPL CALC-MCNC: 50 MG/DL
MCH RBC QN AUTO: 33.7 PG (ref 26.5–33)
MCHC RBC AUTO-ENTMCNC: 35.4 G/DL (ref 31.5–36.5)
MCV RBC AUTO: 95 FL (ref 78–100)
NONHDLC SERPL-MCNC: 83 MG/DL
PLATELET # BLD AUTO: 188 10E9/L (ref 150–450)
POTASSIUM SERPL-SCNC: 4 MMOL/L (ref 3.4–5.3)
PROT SERPL-MCNC: 7.5 G/DL (ref 6.8–8.8)
RBC # BLD AUTO: 4.75 10E12/L (ref 4.4–5.9)
SODIUM SERPL-SCNC: 139 MMOL/L (ref 133–144)
TRIGL SERPL-MCNC: 165 MG/DL
WBC # BLD AUTO: 6.8 10E9/L (ref 4–11)

## 2021-03-18 PROCEDURE — 80053 COMPREHEN METABOLIC PANEL: CPT | Performed by: INTERNAL MEDICINE

## 2021-03-18 PROCEDURE — 85027 COMPLETE CBC AUTOMATED: CPT | Performed by: INTERNAL MEDICINE

## 2021-03-18 PROCEDURE — 80061 LIPID PANEL: CPT | Performed by: INTERNAL MEDICINE

## 2021-03-18 PROCEDURE — 36415 COLL VENOUS BLD VENIPUNCTURE: CPT | Performed by: INTERNAL MEDICINE

## 2021-03-19 ENCOUNTER — TELEPHONE (OUTPATIENT)
Dept: FAMILY MEDICINE | Facility: CLINIC | Age: 66
End: 2021-03-19

## 2021-03-19 ENCOUNTER — MYC MEDICAL ADVICE (OUTPATIENT)
Dept: FAMILY MEDICINE | Facility: CLINIC | Age: 66
End: 2021-03-19

## 2021-03-19 DIAGNOSIS — K76.0 NAFLD (NONALCOHOLIC FATTY LIVER DISEASE): Primary | ICD-10-CM

## 2021-03-19 NOTE — TELEPHONE ENCOUNTER
Pt has read your lab result already, and sent the following mychart.  Please advise if this changes plan, or needs further tx?    Gladis Muro, RN  [facility] RN Triage Team    I have had a dull pain in my side for several days. It seems to be located on my liver, high right side just under rib cage.  It is not too bad

## 2021-03-19 NOTE — TELEPHONE ENCOUNTER
Can we call Billie Aden and let him know that     Martinez Wise,    I had the opportunity to review your recent labs and a summary of your labs reads as follows:    Your complete blood counts show no sign of anemia, normal white blood cell count and platelets.  Your comprehensive metabolic panel showed normal renal function and normal fasting blood glucose indicating no evidence of diabetes mellitus.  Your fasting lipid panel show  - normal HDL (good) cholesterol -as your goal is greater than 40  - low LDL (bad) cholesterol as your goal is less than 130  - stable triglyceride levels    Your follow-up liver function tests were again elevated.  I recommend we repeat these again in the next few weeks.  If they remain elevated we should consider a consultation in gastroenterology    Sincerely,  Jacob Graham MD

## 2021-03-19 NOTE — RESULT ENCOUNTER NOTE
Martinez Wise,    I had the opportunity to review your recent labs and a summary of your labs reads as follows:    Your complete blood counts show no sign of anemia, normal white blood cell count and platelets.  Your comprehensive metabolic panel showed normal renal function and normal fasting blood glucose indicating no evidence of diabetes mellitus.  Your fasting lipid panel show  - normal HDL (good) cholesterol -as your goal is greater than 40  - low LDL (bad) cholesterol as your goal is less than 130  - stable triglyceride levels    Your follow-up liver function tests were again elevated.  I recommend we repeat these again in the next few weeks.  If they remain elevated we should consider a consultation in gastroenterology    Sincerely,  Jacob Graham MD

## 2021-03-19 NOTE — TELEPHONE ENCOUNTER
Pt was called. Future appointment was scheduled. Rates pain 2/10 denies any other symptoms.  Advised seek care at UC if new symptoms develop. Pt verbalized understating and agreement with plan.

## 2021-03-22 ENCOUNTER — ALLIED HEALTH/NURSE VISIT (OUTPATIENT)
Dept: NURSING | Facility: CLINIC | Age: 66
End: 2021-03-22
Payer: COMMERCIAL

## 2021-03-22 ENCOUNTER — OFFICE VISIT (OUTPATIENT)
Dept: FAMILY MEDICINE | Facility: CLINIC | Age: 66
End: 2021-03-22
Payer: COMMERCIAL

## 2021-03-22 VITALS
HEIGHT: 69 IN | SYSTOLIC BLOOD PRESSURE: 136 MMHG | BODY MASS INDEX: 33.47 KG/M2 | OXYGEN SATURATION: 95 % | WEIGHT: 226 LBS | TEMPERATURE: 98 F | HEART RATE: 98 BPM | DIASTOLIC BLOOD PRESSURE: 87 MMHG

## 2021-03-22 DIAGNOSIS — Z23 NEED FOR VACCINATION: Primary | ICD-10-CM

## 2021-03-22 DIAGNOSIS — R10.11 ABDOMINAL PAIN, RIGHT UPPER QUADRANT: Primary | ICD-10-CM

## 2021-03-22 PROCEDURE — 90670 PCV13 VACCINE IM: CPT

## 2021-03-22 PROCEDURE — G0009 ADMIN PNEUMOCOCCAL VACCINE: HCPCS

## 2021-03-22 PROCEDURE — 99207 PR NO CHARGE NURSE ONLY: CPT

## 2021-03-22 PROCEDURE — 99213 OFFICE O/P EST LOW 20 MIN: CPT | Performed by: NURSE PRACTITIONER

## 2021-03-22 ASSESSMENT — MIFFLIN-ST. JEOR: SCORE: 1795.51

## 2021-03-22 NOTE — NURSING NOTE
Prior to immunization administration, verified patients identity using patient s name and date of birth. Please see Immunization Activity for additional information.     Screening Questionnaire for Adult Immunization    Are you sick today?   No   Do you have allergies to medications, food, a vaccine component or latex?   No   Have you ever had a serious reaction after receiving a vaccination?   No   Do you have a long-term health problem with heart, lung, kidney, or metabolic disease (e.g., diabetes), asthma, a blood disorder, no spleen, complement component deficiency, a cochlear implant, or a spinal fluid leak?  Are you on long-term aspirin therapy?   Yes   Do you have cancer, leukemia, HIV/AIDS, or any other immune system problem?   No   Do you have a parent, brother, or sister with an immune system problem?   No   In the past 3 months, have you taken medications that affect  your immune system, such as prednisone, other steroids, or anticancer drugs; drugs for the treatment of rheumatoid arthritis, Crohn s disease, or psoriasis; or have you had radiation treatments?   No   Have you had a seizure, or a brain or other nervous system problem?   No   During the past year, have you received a transfusion of blood or blood    products, or been given immune (gamma) globulin or antiviral drug?   No   For women: Are you pregnant or is there a chance you could become       pregnant during the next month?   No   Have you received any vaccinations in the past 4 weeks?   Yes     Immunization questionnaire was positive for at least one answer.  Notified Dr. Graham.        Per orders of Dr. Graham, injection of Prevnar 13 given by Chasity Guardado MA. Patient instructed to remain in clinic for 15 minutes afterwards, and to report any adverse reaction to me immediately.       Screening performed by Chasity Guardado MA on 3/22/2021 at 9:25 AM.

## 2021-03-22 NOTE — PROGRESS NOTES
"    Assessment & Plan   Problem List Items Addressed This Visit     None      Visit Diagnoses     Abdominal pain, right upper quadrant    -  Primary         Right upper quadrant pain of unknown etiology. Discussed US verses CT imaging with patient to examine liver. Pt decided he would like to forgo imaging at this time as his pain is \"not that bad\". Pt already has plans to follow up with Dr. Graham in a month for repeat LFT testing. Pt counseled on diet changes as he has a history of nonalcoholic fatty liver disease and notes overeating and daily soda drinking.     MARÍA ELENA Snell CNP  M [facility] ABAD Wise is a 65 year old who presents for the following health issues     HPI     Concern - Right Side pain  Onset: 1 week  Description: dull pain on and off  Intensity: mild  Progression of Symptoms:  same  Accompanying Signs & Symptoms: none  Previous history of similar problem: none  Precipitating factors:        Worsened by: ?  Alleviating factors:        Improved by: none  Therapies tried and outcome:  none     Right side pain, maximum intensity 3/10  Both ascending and descending ducts clotted completely 1 year ago  Pain does not feel the same as it did 1 year ago  Gallbladder removed at least 3 years ago  Pain comes and goes, first noticed last Tuesday  Described as dull and achy  No nausea, vomiting  Stools have a slight change in smell, formed, LBM this morning described as regular  Pain does not radiate to back or other areas of the abdomen  Pain improves with laying down  Pt notes he is overeating, almost no alcohol consumption  Drinks Mountain Dew daily   No fevers, chills  No shortness of breath, cough      Review of Systems   Detailed as above       Objective    /87 (BP Location: Left arm, Patient Position: Chair, Cuff Size: Adult Large)   Pulse 98   Temp 98  F (36.7  C) (Temporal)   Ht 1.753 m (5' 9\")   Wt 102.5 kg (226 lb)   SpO2 95%   BMI 33.37 " kg/m    Body mass index is 33.37 kg/m .  Physical Exam  Constitutional:       Appearance: Normal appearance.   HENT:      Head: Normocephalic.   Abdominal:      General: Bowel sounds are decreased.      Palpations: Abdomen is soft.      Tenderness: There is abdominal tenderness in the right upper quadrant.      Comments: Very minimal tenderness with palpation over lateral RUQ   Skin:     General: Skin is warm and dry.      Findings: No rash.   Neurological:      General: No focal deficit present.      Mental Status: He is alert. Mental status is at baseline.   Psychiatric:         Mood and Affect: Mood normal.         Behavior: Behavior normal.         Thought Content: Thought content normal.         ----- Services Performed by a MEDICAL STUDENT in Presence of ATTENDING Physician-------    John Dotson DNP student

## 2021-08-06 ENCOUNTER — APPOINTMENT (OUTPATIENT)
Dept: MRI IMAGING | Facility: CLINIC | Age: 66
DRG: 066 | End: 2021-08-06
Attending: EMERGENCY MEDICINE
Payer: COMMERCIAL

## 2021-08-06 ENCOUNTER — APPOINTMENT (OUTPATIENT)
Dept: CT IMAGING | Facility: CLINIC | Age: 66
DRG: 066 | End: 2021-08-06
Attending: EMERGENCY MEDICINE
Payer: COMMERCIAL

## 2021-08-06 ENCOUNTER — APPOINTMENT (OUTPATIENT)
Dept: OCCUPATIONAL THERAPY | Facility: CLINIC | Age: 66
DRG: 066 | End: 2021-08-06
Attending: PHYSICIAN ASSISTANT
Payer: COMMERCIAL

## 2021-08-06 ENCOUNTER — HOSPITAL ENCOUNTER (INPATIENT)
Facility: CLINIC | Age: 66
LOS: 1 days | Discharge: HOME | DRG: 066 | End: 2021-08-07
Attending: EMERGENCY MEDICINE | Admitting: INTERNAL MEDICINE
Payer: COMMERCIAL

## 2021-08-06 DIAGNOSIS — I63.9 CEREBROVASCULAR ACCIDENT (CVA), UNSPECIFIED MECHANISM (H): ICD-10-CM

## 2021-08-06 LAB
ALBUMIN SERPL-MCNC: 4 G/DL (ref 3.4–5)
ALP SERPL-CCNC: 69 U/L (ref 40–150)
ALT SERPL W P-5'-P-CCNC: 64 U/L (ref 0–70)
ANION GAP SERPL CALCULATED.3IONS-SCNC: 3 MMOL/L (ref 3–14)
APTT PPP: 30 SECONDS (ref 22–38)
AST SERPL W P-5'-P-CCNC: 37 U/L (ref 0–45)
ATRIAL RATE - MUSE: 85 BPM
BASOPHILS # BLD AUTO: 0 10E3/UL (ref 0–0.2)
BASOPHILS NFR BLD AUTO: 0 %
BILIRUB SERPL-MCNC: 3.5 MG/DL (ref 0.2–1.3)
BUN SERPL-MCNC: 15 MG/DL (ref 7–30)
CALCIUM SERPL-MCNC: 8.7 MG/DL (ref 8.5–10.1)
CHLORIDE BLD-SCNC: 109 MMOL/L (ref 94–109)
CHOLEST SERPL-MCNC: 111 MG/DL
CO2 SERPL-SCNC: 26 MMOL/L (ref 20–32)
CREAT SERPL-MCNC: 1.06 MG/DL (ref 0.66–1.25)
DIASTOLIC BLOOD PRESSURE - MUSE: NORMAL MMHG
EOSINOPHIL # BLD AUTO: 0.2 10E3/UL (ref 0–0.7)
EOSINOPHIL NFR BLD AUTO: 4 %
ERYTHROCYTE [DISTWIDTH] IN BLOOD BY AUTOMATED COUNT: 12.1 % (ref 10–15)
FASTING STATUS PATIENT QL REPORTED: YES
GFR SERPL CREATININE-BSD FRML MDRD: 73 ML/MIN/1.73M2
GLUCOSE BLD-MCNC: 147 MG/DL (ref 70–99)
GLUCOSE BLDC GLUCOMTR-MCNC: 147 MG/DL (ref 70–99)
HBA1C MFR BLD: 5.9 % (ref 0–5.6)
HCT VFR BLD AUTO: 43.2 % (ref 40–53)
HDLC SERPL-MCNC: 44 MG/DL
HGB BLD-MCNC: 15.2 G/DL (ref 13.3–17.7)
HOLD SPECIMEN: NORMAL
IMM GRANULOCYTES # BLD: 0 10E3/UL
IMM GRANULOCYTES NFR BLD: 0 %
INR PPP: 1.08 (ref 0.85–1.15)
INTERPRETATION ECG - MUSE: NORMAL
LDLC SERPL CALC-MCNC: 47 MG/DL
LYMPHOCYTES # BLD AUTO: 2.6 10E3/UL (ref 0.8–5.3)
LYMPHOCYTES NFR BLD AUTO: 43 %
MCH RBC QN AUTO: 32.5 PG (ref 26.5–33)
MCHC RBC AUTO-ENTMCNC: 35.2 G/DL (ref 31.5–36.5)
MCV RBC AUTO: 93 FL (ref 78–100)
MONOCYTES # BLD AUTO: 0.5 10E3/UL (ref 0–1.3)
MONOCYTES NFR BLD AUTO: 9 %
NEUTROPHILS # BLD AUTO: 2.8 10E3/UL (ref 1.6–8.3)
NEUTROPHILS NFR BLD AUTO: 44 %
NONHDLC SERPL-MCNC: 67 MG/DL
NRBC # BLD AUTO: 0 10E3/UL
NRBC BLD AUTO-RTO: 0 /100
P AXIS - MUSE: 56 DEGREES
PLATELET # BLD AUTO: 180 10E3/UL (ref 150–450)
POTASSIUM BLD-SCNC: 3.8 MMOL/L (ref 3.4–5.3)
PR INTERVAL - MUSE: 140 MS
PROT SERPL-MCNC: 7.5 G/DL (ref 6.8–8.8)
QRS DURATION - MUSE: 98 MS
QT - MUSE: 384 MS
QTC - MUSE: 456 MS
R AXIS - MUSE: 31 DEGREES
RBC # BLD AUTO: 4.67 10E6/UL (ref 4.4–5.9)
SARS-COV-2 RNA RESP QL NAA+PROBE: NEGATIVE
SODIUM SERPL-SCNC: 138 MMOL/L (ref 133–144)
SYSTOLIC BLOOD PRESSURE - MUSE: NORMAL MMHG
T AXIS - MUSE: 64 DEGREES
TRIGL SERPL-MCNC: 99 MG/DL
TROPONIN I SERPL-MCNC: <0.015 UG/L (ref 0–0.04)
VENTRICULAR RATE- MUSE: 85 BPM
WBC # BLD AUTO: 6.2 10E3/UL (ref 4–11)

## 2021-08-06 PROCEDURE — 93005 ELECTROCARDIOGRAM TRACING: CPT

## 2021-08-06 PROCEDURE — 99291 CRITICAL CARE FIRST HOUR: CPT

## 2021-08-06 PROCEDURE — 80061 LIPID PANEL: CPT | Performed by: PHYSICIAN ASSISTANT

## 2021-08-06 PROCEDURE — 36415 COLL VENOUS BLD VENIPUNCTURE: CPT | Performed by: EMERGENCY MEDICINE

## 2021-08-06 PROCEDURE — 70450 CT HEAD/BRAIN W/O DYE: CPT

## 2021-08-06 PROCEDURE — 255N000002 HC RX 255 OP 636: Performed by: EMERGENCY MEDICINE

## 2021-08-06 PROCEDURE — 250N000009 HC RX 250: Performed by: EMERGENCY MEDICINE

## 2021-08-06 PROCEDURE — 97165 OT EVAL LOW COMPLEX 30 MIN: CPT | Mod: GO

## 2021-08-06 PROCEDURE — 85025 COMPLETE CBC W/AUTO DIFF WBC: CPT | Performed by: EMERGENCY MEDICINE

## 2021-08-06 PROCEDURE — 258N000003 HC RX IP 258 OP 636: Performed by: PHYSICIAN ASSISTANT

## 2021-08-06 PROCEDURE — 85610 PROTHROMBIN TIME: CPT | Performed by: EMERGENCY MEDICINE

## 2021-08-06 PROCEDURE — 250N000011 HC RX IP 250 OP 636: Performed by: EMERGENCY MEDICINE

## 2021-08-06 PROCEDURE — 250N000013 HC RX MED GY IP 250 OP 250 PS 637: Performed by: EMERGENCY MEDICINE

## 2021-08-06 PROCEDURE — 83036 HEMOGLOBIN GLYCOSYLATED A1C: CPT | Performed by: PHYSICIAN ASSISTANT

## 2021-08-06 PROCEDURE — 97535 SELF CARE MNGMENT TRAINING: CPT | Mod: GO

## 2021-08-06 PROCEDURE — 70553 MRI BRAIN STEM W/O & W/DYE: CPT

## 2021-08-06 PROCEDURE — 99207 PR APP CREDIT; MD BILLING SHARED VISIT: CPT | Performed by: PHYSICIAN ASSISTANT

## 2021-08-06 PROCEDURE — 0042T CT HEAD PERFUSION WITH CONTRAST: CPT

## 2021-08-06 PROCEDURE — 84484 ASSAY OF TROPONIN QUANT: CPT | Performed by: EMERGENCY MEDICINE

## 2021-08-06 PROCEDURE — 120N000001 HC R&B MED SURG/OB

## 2021-08-06 PROCEDURE — 70498 CT ANGIOGRAPHY NECK: CPT

## 2021-08-06 PROCEDURE — 99223 1ST HOSP IP/OBS HIGH 75: CPT | Mod: AI | Performed by: INTERNAL MEDICINE

## 2021-08-06 PROCEDURE — 82040 ASSAY OF SERUM ALBUMIN: CPT | Performed by: EMERGENCY MEDICINE

## 2021-08-06 PROCEDURE — 85730 THROMBOPLASTIN TIME PARTIAL: CPT | Performed by: EMERGENCY MEDICINE

## 2021-08-06 PROCEDURE — 36415 COLL VENOUS BLD VENIPUNCTURE: CPT | Performed by: PHYSICIAN ASSISTANT

## 2021-08-06 PROCEDURE — 87635 SARS-COV-2 COVID-19 AMP PRB: CPT | Performed by: EMERGENCY MEDICINE

## 2021-08-06 PROCEDURE — A9585 GADOBUTROL INJECTION: HCPCS | Performed by: EMERGENCY MEDICINE

## 2021-08-06 PROCEDURE — 99291 CRITICAL CARE FIRST HOUR: CPT | Mod: GC | Performed by: PSYCHIATRY & NEUROLOGY

## 2021-08-06 RX ORDER — ONDANSETRON 4 MG/1
4 TABLET, ORALLY DISINTEGRATING ORAL EVERY 6 HOURS PRN
Status: DISCONTINUED | OUTPATIENT
Start: 2021-08-06 | End: 2021-08-07 | Stop reason: HOSPADM

## 2021-08-06 RX ORDER — ASPIRIN 81 MG/1
81 TABLET, CHEWABLE ORAL ONCE
Status: COMPLETED | OUTPATIENT
Start: 2021-08-06 | End: 2021-08-06

## 2021-08-06 RX ORDER — ATORVASTATIN CALCIUM 40 MG/1
40 TABLET, FILM COATED ORAL DAILY
Status: DISCONTINUED | OUTPATIENT
Start: 2021-08-07 | End: 2021-08-07 | Stop reason: HOSPADM

## 2021-08-06 RX ORDER — ASPIRIN 81 MG/1
81 TABLET ORAL DAILY
Status: DISCONTINUED | OUTPATIENT
Start: 2021-08-07 | End: 2021-08-07 | Stop reason: HOSPADM

## 2021-08-06 RX ORDER — POLYETHYLENE GLYCOL 3350 17 G/17G
17 POWDER, FOR SOLUTION ORAL DAILY PRN
Status: DISCONTINUED | OUTPATIENT
Start: 2021-08-06 | End: 2021-08-07 | Stop reason: HOSPADM

## 2021-08-06 RX ORDER — IOPAMIDOL 755 MG/ML
120 INJECTION, SOLUTION INTRAVASCULAR ONCE
Status: COMPLETED | OUTPATIENT
Start: 2021-08-06 | End: 2021-08-06

## 2021-08-06 RX ORDER — CALCIUM CARBONATE 500 MG/1
1000 TABLET, CHEWABLE ORAL 4 TIMES DAILY PRN
Status: DISCONTINUED | OUTPATIENT
Start: 2021-08-06 | End: 2021-08-07 | Stop reason: HOSPADM

## 2021-08-06 RX ORDER — SODIUM CHLORIDE 9 MG/ML
INJECTION, SOLUTION INTRAVENOUS CONTINUOUS
Status: ACTIVE | OUTPATIENT
Start: 2021-08-06 | End: 2021-08-06

## 2021-08-06 RX ORDER — CLOPIDOGREL BISULFATE 75 MG/1
75 TABLET ORAL DAILY
Status: DISCONTINUED | OUTPATIENT
Start: 2021-08-07 | End: 2021-08-07 | Stop reason: HOSPADM

## 2021-08-06 RX ORDER — LIDOCAINE 40 MG/G
CREAM TOPICAL
Status: DISCONTINUED | OUTPATIENT
Start: 2021-08-06 | End: 2021-08-07 | Stop reason: HOSPADM

## 2021-08-06 RX ORDER — GADOBUTROL 604.72 MG/ML
10 INJECTION INTRAVENOUS ONCE
Status: COMPLETED | OUTPATIENT
Start: 2021-08-06 | End: 2021-08-06

## 2021-08-06 RX ORDER — CLOPIDOGREL 300 MG/1
300 TABLET, FILM COATED ORAL ONCE
Status: COMPLETED | OUTPATIENT
Start: 2021-08-06 | End: 2021-08-06

## 2021-08-06 RX ORDER — ONDANSETRON 2 MG/ML
4 INJECTION INTRAMUSCULAR; INTRAVENOUS EVERY 6 HOURS PRN
Status: DISCONTINUED | OUTPATIENT
Start: 2021-08-06 | End: 2021-08-07 | Stop reason: HOSPADM

## 2021-08-06 RX ADMIN — IOPAMIDOL 120 ML: 755 INJECTION, SOLUTION INTRAVENOUS at 07:51

## 2021-08-06 RX ADMIN — CLOPIDOGREL BISULFATE 300 MG: 300 TABLET, FILM COATED ORAL at 10:25

## 2021-08-06 RX ADMIN — SODIUM CHLORIDE 100 ML: 9 INJECTION, SOLUTION INTRAVENOUS at 07:51

## 2021-08-06 RX ADMIN — SODIUM CHLORIDE: 9 INJECTION, SOLUTION INTRAVENOUS at 12:55

## 2021-08-06 RX ADMIN — GADOBUTROL 10 ML: 604.72 INJECTION INTRAVENOUS at 08:54

## 2021-08-06 RX ADMIN — ASPIRIN 81 MG CHEWABLE TABLET 81 MG: 81 TABLET CHEWABLE at 10:25

## 2021-08-06 ASSESSMENT — MIFFLIN-ST. JEOR: SCORE: 1790.97

## 2021-08-06 ASSESSMENT — ACTIVITIES OF DAILY LIVING (ADL)
PREVIOUS_RESPONSIBILITIES: HOUSEKEEPING;LAUNDRY;SHOPPING;YARDWORK;MEDICATION MANAGEMENT;FINANCES;DRIVING
ADLS_ACUITY_SCORE: 9
ADLS_ACUITY_SCORE: 9
ADLS_ACUITY_SCORE: 11

## 2021-08-06 ASSESSMENT — ENCOUNTER SYMPTOMS: WEAKNESS: 1

## 2021-08-06 NOTE — PROGRESS NOTES
RECEIVING UNIT ED HANDOFF REVIEW    ED Nurse Handoff Report was reviewed by: Kayla Naik RN on August 6, 2021 at 12:50 PM

## 2021-08-06 NOTE — ED NOTES
"[facility]  ED Nurse Handoff Report    ED Chief complaint: Extremity Weakness      ED Diagnosis:   Final diagnoses:   Cerebrovascular accident (CVA), unspecified mechanism (H)       Code Status: Full Code    Allergies:   Allergies   Allergen Reactions     Dilaudid [Hydromorphone]      Agitation, did not help with pain     Morphine Anxiety       Patient Story: extremities weakness.  Focused Assessment:  Patient woke up this morning with right arm weakness, reports feeling normal at 4:30 when went to bathroom.     Treatments and/or interventions provided: Plavix, ASA  Patient's response to treatments and/or interventions: pending evaluation    To be done/followed up on inpatient unit:  close monitor    Does this patient have any cognitive concerns?: na    Activity level - Baseline/Home:  Independent  Activity Level - Current:   Independent    Patient's Preferred language: English   Needed?: No    Isolation: None  Infection: Not Applicable  Patient tested for COVID 19 prior to admission: YES  Bariatric?: No    Vital Signs:   Vitals:    08/06/21 0742 08/06/21 0802   BP: (!) 162/90 (!) 144/95   Pulse: 80 81   Resp: 16 16   Temp:  98.6  F (37  C)   TempSrc:  Oral   SpO2: 98% 96%   Weight:  102.1 kg (225 lb)   Height:  1.753 m (5' 9\")       Cardiac Rhythm:     Was the PSS-3 completed:   Yes  What interventions are required if any?               Family Comments: family at bedside  OBS brochure/video discussed/provided to patient/family: No              Name of person given brochure if not patient: na              Relationship to patient: na    For the majority of the shift this patient's behavior was Green.   Behavioral interventions performed were none.    ED NURSE PHONE NUMBER: *[phone]         "

## 2021-08-06 NOTE — PLAN OF CARE
SLP: ST orders received, chart reviewed and discussed with RN. Pt passed nursing screen. No IP SLP needs identified at this time. Will complete orders.

## 2021-08-06 NOTE — PLAN OF CARE
Here with left precentral gyrus, left superior parietal stroke.  Neuros stable until 1715 when he redeveloped right hand numbness. Stable RUE drift, slightly slow on finger to nose.  C/o slight headache mid-day, no intervention.  MD updated on change, no new orders, continue to monitor Q 2 hour neuros.  Tele NSR.  Regular diet.  Up independently in room.  Plan for PT eval, echo tomorrow.  Discharge plans pending.

## 2021-08-06 NOTE — CONSULTS
"[facility]    Stroke Consult Note    Reason for Consult: Stroke Code     Chief Complaint: Extremity Weakness      JOEL Aden is a 65 year old male presents with LKW at 0430 8/6/21 with R hand clumsiness and weakness. He noted he was unable to grab and hold things. While in ED pt with rapid improvement in sxs and saw some resolution of weakness.     Thrombolytic Treatment   Not given due to patient/family declined.    Endovascular Treatment  Not initiated due to absence of proximal vessel occlusion    Impression   Acute ischemic stroke of L MCA  due to embolic stroke of undetermined source (ESUS)      Recommendations  - Use orderset: \"Ischemic Stroke Routine Admission\" or \"Ischemic Stroke No Thrombolytics/No Thrombectomy ICU Admission\"  - Neurochecks and Vital Signs every 2 hours   - Permissive HTN; goal SBP < 220 mmHg  - Daily aspirin 81 mg for secondary stroke prevention  - Plavix (clopidogrel) 300 mg PO loading dose x 1  - Plavix (clopidogrel) 75 mg PO Daily  - Statin: Atorvastatin 80mg qday  - TTE (with Bubble Study if age 60 yrs or less)  - Telemetry, EKG  - Bedside Glucose Monitoring  - A1c, Lipid Panel, Troponin x 3  - PT/OT/SLP  - Stroke Education  - Euthermia, Euglycemia    Patient Follow-up    - final recommendation pending work-up    Thank you for this consult. We will continue to follow.     The Stroke Staff is Dr. Jeter.    Caleb Gordon MD  Vascular Neurology Fellow  To page me or covering stroke neurology team member, click here: AMCOM   Choose \"On Call\" tab at top, then search dropdown box for \"Neurology Adult\", select location, press Enter, then look for stroke/neuro ICU/telestroke.    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     Nephrolithiasis      Pelvic fracture (H) 11/2014    occured while horseback riding     Past Surgical History   Past Surgical History:   Procedure Laterality Date     COLONOSCOPY       COSMETIC SURGERY  11/20/2017     HC CLOSED TX SHOULDER DISLOC W MANIP NO ANESTH       LAPAROSCOPIC CHOLECYSTECTOMY N/A 12/23/2014    Surgeon: Avelino Bryson MD;  Location: [facility]     REPAIR MOHS Left 1/25/2018    Procedure: REPAIR MOHS;  Left Lower Eyelid Mohs Reconstruction;  Surgeon: Claudia Melvin MD;  Location:  OR     Medications   Home Meds  Prior to Admission medications    Medication Sig Start Date End Date Taking? Authorizing Provider   amLODIPine (NORVASC) 5 MG tablet Take 1 tablet (5 mg) by mouth daily 2/3/21  Yes Jacob Graham MD   aspirin 81 MG EC tablet Take 81 mg by mouth daily   Yes Reported, Patient   atorvastatin (LIPITOR) 40 MG tablet Take 1 tablet (40 mg) by mouth daily 2/3/21  Yes Jacob Graham MD       Scheduled Meds      Infusion Meds      PRN Meds      Allergies   Allergies   Allergen Reactions     Dilaudid [Hydromorphone]      Agitation, did not help with pain     Morphine Anxiety     Family History   Family History   Problem Relation Age of Onset     Cancer - colorectal Mother 79     Cardiovascular Father 70        Had a carotid endarterectomy at age 70 years.  Tobacco user.     Heart Surgery Brother 66        Alive. Had CABG at age 66 years. Smoker     Diabetes Type 2  Brother      Melanoma Brother      Social History   Social History     Tobacco Use     Smoking status: Never Smoker     Smokeless tobacco: Never Used   Substance Use Topics     Alcohol use: Yes     Alcohol/week: 0.0 standard drinks     Comment: occasionally     Drug use: No       Review of Systems   The 5 point Review of  Systems is negative other than noted in the HPI or here.        PHYSICAL EXAMINATION  Temp:  [98.6  F (37  C)] 98.6  F (37  C)  Pulse:  [80-81] 81  Resp:  [16] 16  BP: (144-162)/(90-95) 144/95  SpO2:  [96 %-98 %] 96 %     General Exam  General:  patient lying in bed without any acute distress    HEENT:  normocephalic/atraumatic  Cardio:  RRR  Pulmonary:  no respiratory distress  Abdomen:  non-distended  Extremities:  no edema  Skin:  intact     Neuro Exam  Mental Status:  alert, oriented x 3, follows commands, speech clear and fluent, naming and repetition normal  Cranial Nerves:  visual fields intact, PERRL, EOMI with normal smooth pursuit, facial sensation intact and symmetric, facial movements symmetric, hearing not formally tested but intact to conversation, palate elevation symmetric and uvula midline, no dysarthria  General physical exam:    HEENT: normocephalic, eyes open with no discharge, nares patent, oropharynx is clear with no lesions, palate intact  CV: regular rate  Chest: normal configuration, no respiratory distress  Ab: soft, non-distended  Skin: no rashes or lesions    Neuro exam:    Mental status:   Awake, alert, and oriented to person, place, time and location/situation    Speech:   Normal Abnormal   Fluency X    Comprehension X    Articulation X    Repetition X    Naming X      Cranial Nerves:   Normal Abnormal   II Pupils equal (3mm), round,  reactive.       Ill, IV, VI EOMI, no nystagmus    V Intact to LT in V1-3    VII Face symmetrical, eye closure  symmetrical    VIII Hearing intact to voice    IX,X     XI     XII         Muscle/motor:   Tone: Normal   Bulk: Normal   Fasciculations: None observed      Pronator drift: Absent    Neck Flexion    Neck Extension         Right Left  Right Left   Shoulder abductors: 5 5 Hip flexors: 5 5   Elbow flexors: 5 5 Hip abductors:     Elbow extensors: 5 5 Hip extensors:     Wrist extensors: 5  Hip adductors:     Wrist flexors: 4+  Knee flexors:     Finger  flexors: 4+ 5 Knee extensors:     Finger extensors: 4+  Ankle plantar flexors: 5 5   Finger abductors: 4+  Ankle dorsiflexors: 5 5   Thumb abductors: 4+  Ankle inversion:        Ankle eversion:        Toe extensors:        Toe flexors:       No abnormal movements, rigidity or spasticity    Reflexes:  deferred      Sensation:   Normal  RUE LUE RLE LLE   Light Touch x         In UE: Some extinction to double stim    Coordination:     Normal  Abnormal Right Abnormal Left   Finger to Nose x     Rapid Alternating      Heel to Shin x     Finger Tap      Foot Tap      Other          Gait and Station:   deferred          Dysphagia Screen  Per Nursing    Stroke Scales    NIHSS  Interval baseline (08/06/21 0755)   Interval Comments     1a. Level of Consciousness 0-->Alert, keenly responsive   1b. LOC Questions 0-->Answers both questions correctly   1c. LOC Commands 0-->Performs both tasks correctly   2.   Best Gaze 0-->Normal   3.   Visual 0-->No visual loss   4.   Facial Palsy 0-->Normal symmetrical movements   5a. Motor Arm, Left 0-->No drift, limb holds 90 (or 45) degrees for full 10 secs   5b. Motor Arm, Right 0-->No drift, limb holds 90 (or 45) degrees for full 10 secs   6a. Motor Leg, Left 0-->No drift, leg holds 30 degree position for full 5 secs   6b. Motor Leg, right 0-->No drift, leg holds 30 degree position for full 5 secs   7.   Limb Ataxia 0-->Absent   8.   Sensory 0-->Normal, no sensory loss   9.   Best Language 0-->No aphasia, normal   10. Dysarthria 0-->Normal   11. Extinction and Inattention  1-->Visual, tactile, auditory, spatial, or personal inattention or extinction to bilateral simultaneous stimulation in one of the sensory modalities   Total 1 (08/06/21 0755)       Modified José Miguel Score (Pre-morbid)  0-No symptoms    Imaging  I personally reviewed all imaging; relevant findings per HPI.     Lab Results Data   CBC  Recent Labs   Lab 08/06/21  0742   WBC 6.2   RBC 4.67   HGB 15.2   HCT 43.2         Basic Metabolic Panel    Recent Labs   Lab 08/06/21  0742 08/06/21  0736     --    POTASSIUM 3.8  --    CHLORIDE 109  --    CO2 26  --    BUN 15  --    CR 1.06  --    * 147*   ARMANI 8.7  --      Liver Panel  Recent Labs   Lab 08/06/21  0742   PROTTOTAL 7.5   ALBUMIN 4.0   BILITOTAL 3.5*   ALKPHOS 69   AST 37   ALT 64     INR    Recent Labs   Lab Test 08/06/21  0742 03/28/20  0623 03/28/20  0002   INR 1.08 1.14 1.11      Lipid Profile    Recent Labs   Lab Test 03/18/21  0822 08/30/19  0814 10/03/17  0929 07/28/15  1815 09/30/14  0844 09/12/13  1135   CHOL 128 121 128 183 188 214*   HDL 45 42 52 44 41 46   LDL 50 55 50 123 113 150*   TRIG 165* 120 130 79 168* 88   CHOLHDLRATIO  --   --   --  4.2 4.6 4.7     A1C    Recent Labs   Lab Test 08/30/19  0814 09/30/14  0844   A1C 5.5 5.5     Troponin I  No results for input(s): TROPI in the last 168 hours.       Stroke Code / Stroke Consult Data Data   Stroke Code Data  (for stroke code without tele)  Stroke code activated 08/06/21   0738   First stroke provider response 08/06/21   0740   Last known normal 08/06/21   0430   Time of discovery   (or onset of symptoms) 08/06/21   0700   Head CT read by Stroke Neuro Dr/Provider 08/06/21   0750   Was stroke code de-escalated? No

## 2021-08-06 NOTE — ED TRIAGE NOTES
Woke up at 0700 noted right arm weakness, difficulty grabbing with right hand. Went to bed feeling well. Reports waking up around 04:30 for bathroom did not note any of the symptoms.

## 2021-08-06 NOTE — ED PROVIDER NOTES
History   Chief Complaint:  Extremity Weakness       The history is provided by the patient.      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 This morning at 7 am, 40 minutes prior to ER visit, Billie woke up and noticed lack of coordination and strength in his right arm, notes difficulty with picking up objects. No reported worsening or alleviated factors to symtoms. He states he did not experience any abnormalities the night prior going to sleep which was around 11p. He denies any history of neurological trouble.       Review of Systems   Neurological: Positive for weakness.   All other systems reviewed and are negative.      Allergies:  Dilaudid [Hydromorphone]  Morphine    Medications:  Atorvastatin  Amlodipine  Aspirin 81 MG    Past Medical History:    Arthritis  Basal cell carcinoma of skin  Cholelithiasis  Heart disease  Hypertension  Inferior mesenteric vein thrombosis  Nephrolithiasis   Pelvis fracture  Nonalcoholic fatty liver disease  Coronary artery calcification   Hyperlipidemia  CKD, stage 3  Impaired fasting glucose  IFG   Hyperbilirubinemia      Past Surgical History:    Cosmetic surgery  Closed Tx shoulder dislocation   Cholecystectomy   Repair MOHS     Family History:    Mother - colorectal cancer  Father - cardiovascular, carotid endarterectomy   Brother - heart surgery, CABG, diabetes type 2, melanoma     Social History:  The patient is unaccompanied today in the ED.     Physical Exam     No data found.    Physical Exam  General/Appearance: appears stated age, well-groomed, appears comfortable  Eyes: EOMI, no scleral injection, no icterus  ENT: MMM  Neck: supple, nl ROM, no stiffness  Cardiovascular: RRR, nl S1S2, no m/r/g, 2+ pulses in all 4 extremities, cap refill <2sec  Respiratory: CTAB, good air movement throughout, no  wheezes/rhonchi/rales, no increased WOB, no retractions  Back: no lesions  GI: abd soft, non-distended, nttp,  no HSM, no rebound, no guarding, nl BS  MSK: CHAVIRA, good tone, no bony abnormality  Skin: warm and well-perfused, no rash, no edema, no ecchymosis, nl turgor  Neuro: GCS 15, alert and oriented, difficulty with  in LUE, no drift but unable to extend L hand, otherwise no focal neuro deficits, NIHSS 2  Psych: interacts appropriately  Heme: no petechia, no purpura, no active bleeding        Emergency Department Course   ECG  ECG taken at 0810, ECG read at 0813  Normal sinus rhythm   Normal ECG     Rate 85 bpm. MI interval 140 ms. QRS duration 98 ms. QT/QTc 384/456 ms. P-R-T axes 56 31 64.     Imaging:    CT Head Perfusion w Contrast  Final Result  IMPRESSION: Normal CT perfusion of the brain.  Per radiology    CTA Head Neck with Contrast  Final Result  IMPRESSION:  HEAD CTA:  1.  Normal head CTA.  2.  No significant stenosis.  No aneurysm or vascular malformation.  NECK CTA:  1.  No significant stenosis as per NASCET criteria.  Per radiology    CT Head w/o Contrast  Final Result  IMPRESSION:  Normal intracranial contents.  Right maxillary exostosis. This is incompletely evaluated.  Per radiology     MR Brain w/o & w Contrast  Final Result  IMPRESSION:  1.  Scattered punctate acute infarcts in the knob of the left  precentral gyrus, in the adjacent left postcentral gyrus and in the  left superior parietal lobule.  2.  No mass effect or hemorrhagic transformation.  3.  Mild presumed chronic small vessel ischemic change.  Per radiology      Laboratory:  CBC: WBC 6.2, HGB 15.2,      Glucose by Meter (Collected 0736): 147 (H)    INR/Prothromboplastin Time: INR: 1.08 ; PTT: 30    CMP: Glucose: 147 (H); Bilirubin: 3.5 (H) o/w WNL (Creatinine 1.06)     Troponin (Collected 0742): <0.015      Emergency Department Course:    Reviewed:  I reviewed nursing notes, vitals and past medical  history    Assessments:  0734 I obtained history and examined the patient as noted above.     0838 I rechecked the patient and explained findings.    1015 I rechecked the patient and explained findings.    1019 I spoke with hospitalist, Dr. Bowling.     Consults:   0800 I consulted with neurologist.     0827 I spoke with [facility] [place], Dr Caleb Gordon MD. Reports Patient to have denied TNK.    0843 Called to patient's bedside as he again was having increasing numbness to his fingers.  He still had normal movement and strength.  While at the bedside discussing the importance of making a decision about TN K he states symptoms began to improve again.  At this point in time, after understanding that were getting very close to the cutoff of 9:00 as he does report he got up at 4:30 in the morning to go to the bathroom and was noted normal, he is opting to not get TN K.  MRI is ready for him.    Interventions:  0854 Gadobutrol 10 mL IV    1025 Clopidogrel 300 mg PO, Aspirin 81 mg PO    Disposition:  The patient was admitted to the hospital under the care of Dr. Bowling.       Impression & Plan   CMS Diagnoses: The patient has stroke symptoms:         ED Stroke specific documentation           NIHSS PDF     Patient last known well time: 2300   ED Provider first to bedside at: 0734   CT Results received at: 0810    Thrombolytics:   Not given due to patient/family declined.    If treating with thrombolytics: Ensure SBP<180 and DBP<105 prior to treatment with thrombolytics.  Administering thrombolytics after treatment with IV labetalol, hydralazine, or nicardipine is reasonable once BP control is established.    Endovascular Retrieval:  Not initiated due to absence of proximal vessel occlusion    National Institutes of Health Stroke Scale (Baseline)  Time Performed: 0735     Score    Level of consciousness: (0)   Alert, keenly responsive    LOC questions: (0)   Answers both questions correctly    LOC commands: (0)    Performs both tasks correctly    Best gaze: (0)   Normal    Visual: (0)   No visual loss    Facial palsy: (0)   Normal symmetrical movements    Motor arm (left): (0)   No drift    Motor arm (right): (1)   Drift    Motor leg (left): (0)   No drift    Motor leg (right): (0)   No drift    Limb ataxia: (1)   Present in one limb    Sensory: (0)   Normal- no sensory loss    Best language: (0)   Normal- no aphasia    Dysarthria: (0)   Normal    Extinction and inattention: (0)   No abnormality        Total Score:  2        Stroke Mimics were considered (including migraine headache, seizure disorder, hypoglycemia (or hyperglycemia), head or spinal trauma, CNS  infection, Toxin ingestion and shock state (e.g. sepsis) .      Medical Decision Making:  This patient is a pleasant 65-year-old male who presents with last known well actually at 430 this morning when he woke up to go to the bathroom but, when he woke up at 7 AM, noticed significant right upper extremity weakness and clumsiness.  He was made a tier 2 stroke.  CTs were unremarkable.  The stroke neurologist was at the bedside and had a conversation with the patient regarding TN K.  Ultimately, after hearing the benefits and risks, in light of his neurologic symptoms improving, the patient declined receiving TN K.  I was called back into the room as his numbness again started to worsen.  He still had full strength at that point.  I explained to him that at that time we had about 15 minutes left in the window where he still would be a TNKase candidate.  We again discussed some risks and benefits and he again opted to decline the TN K.  MRI has come back as showing strokes.  He is being loaded with Plavix and aspirin.  He will be admitted to the hospitalist service for stroke work-up.    Covid-19  Billie Aden was evaluated during a global COVID-19 pandemic, which necessitated consideration that the patient might be at risk for infection with the SARS-CoV-2 virus that  causes COVID-19.   Applicable protocols for evaluation were followed during the patient's care.   COVID-19 was considered as part of the patient's evaluation. The plan for testing is:  a test was obtained during this visit.      Diagnosis:    ICD-10-CM    1. Cerebrovascular accident (CVA), unspecified mechanism (H)  I63.9        Discharge Medications:  New Prescriptions    No medications on file       Scribe Disclosure:  Peyton PUGH, am serving as a scribe at 7:32 AM on 8/6/2021 to document services personally performed by Luann Lynn MD based on my observations and the provider's statements to me.            Luann Lynn MD  08/06/21 8049

## 2021-08-06 NOTE — PROVIDER NOTIFICATION
"Text to neuro fellow    \" Admit from today, c/o right hand numbness returned, not present since on unit.  /88. Strength 5/5, slightly slow and deliberate. \"  "

## 2021-08-06 NOTE — PHARMACY-ADMISSION MEDICATION HISTORY
Pharmacy Medication History  Admission medication history interview status for the 8/6/2021  admission is complete. See EPIC admission navigator for prior to admission medications     Location of Interview: Patient room  Medication history sources: Patient and Surescripts    Significant changes made to the medication list:  none    In the past week, patient estimated taking medication this percent of the time: greater than 90%    Additional medication history information:   none    Medication reconciliation completed by provider prior to medication history? No    Time spent in this activity: 15 min    Prior to Admission medications    Medication Sig Last Dose Taking? Auth Provider   amLODIPine (NORVASC) 5 MG tablet Take 1 tablet (5 mg) by mouth daily 8/5/2021 at Unknown time Yes Jacob Graham MD   aspirin 81 MG EC tablet Take 81 mg by mouth daily 8/5/2021 at Unknown time Yes Reported, Patient   atorvastatin (LIPITOR) 40 MG tablet Take 1 tablet (40 mg) by mouth daily 8/5/2021 at Unknown time Yes Jacob Graham MD       The information provided in this note is only as accurate as the sources available at the time of update(s)

## 2021-08-06 NOTE — H&P
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   Is found to have acute left MCA stroke and is admitted for further management and neurological monitoring.    Acute ischemic stroke of left MCA  Presenting with right upper extremity weakness.  Code stroke called in ER.  Patient offered TNKase, but as his symptoms were improving this was not given.  CT of the head negative for acute changes.  CTA of the head/neck is negative.  MRI brain shows scattered punctate acute infarcts in the knob of the left precentral gyrus, and the adjacent left postcentral gyrus, and then the left superior parietal lobe.  No mass-effect or hemorrhagic transformation.  Troponin negative.  Glucose 147.  WBC 6.2, hemoglobin 15.2, platelet count 180.  INR 1.0.  Patient was loaded with Plavix 300 mg, and given aspirin.    --Admitted for further neurology monitoring and evaluation.  --Neurochecks and vital signs every 2 hours  --Permissive hypertension  --Aspirin 81 mg daily  --Plavix 300 mg loading dose x1 given, start 75 mg daily  --Atorvastatin 80 mg daily  --TTE with bubble study  --Telemetry  --A1c and lipid panel for risk stratification  --PT/OT/SLP  --Stroke education    Hypertension  Dyslipidemia  --Permissive hypertension  --On amlodipine 5 mg daily PTA, will hold.  --Continue statin    Hx inferior mesenteric vein thrombosis  --Patient was on anticoagulation for 6 months, and this has since  been discontinued.    Hx fatty liver disease  Total bilirubin 3.5, transaminases and alkaline phosphatase within normal limits.  --Diet and lifestyle modification    Diet: Cardiac diet, no caffeine until  test completed  DVT Prophylaxis: Ambulation  Benjamin Catheter: Not present  Code Status: Full code  Disposition Plan    Given acute CVA, will start inpatient status.  Anticipated least 1-2 more days of hospitalization for further neurological monitoring, neurology input, PT/OT/SLP.    Entered: DIANE Barney 08/06/2021, 11:04 AM          The patient's care was discussed with the patient and his spouse.    The patient has been discussed with Dr. Bowling, who agrees with the assessment and plan at this time.       Jacob Calvo    Primary Care Physician   Dr. 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 This morning at 0700, 40 minutes prior to ER  presentation, pt woke up and noticed lack of coordination and strength in his right arm.  He noted difficulty with picking up objects. He states he did not experience any abnormalities the night prior going to sleep which was around 2300. He denies any history of neurological deficits other than some peripheral neuropathy.  Denies any history of CVA.  He did not have any right lower extremity weakness or other focal weakness.  Denies headache, dizziness, vision changes, chest pain, palpitation, shortness of breath.  Denies abdominal pain, nausea, vomiting, diarrhea.    In the emergency department, patient was evaluated by Dr. Lynn.  Code stroke was initiated, and patient was seen by the stroke neurologist.  Vital signs show blood pressure 144/95, heart rate 81, temperature 98.6, O2 saturation 96% on RA.  EKG was reviewed and shows sinus rhythm.  CT of the head negative for acute changes.  CTA of the head/neck is  negative.  MRI brain shows scattered punctate acute infarcts in the knob of the left precentral gyrus, and the adjacent left postcentral gyrus, and then the left superior parietal lobe.  No mass-effect or hemorrhagic transformation.  Troponin negative.  Glucose 147.  WBC 6.2, hemoglobin 15.2, platelet count 180.  INR 1.0.    Patient offered TNKase, but as his symptoms were improving, patient and neurologist opted to not give thrombolytics.  He was loaded with Plavix 300 mg and given aspirin.  He is admitted for further neurological monitoring and stroke management.    Past Medical History    I have reviewed this patient's medical history and updated it with pertinent information if needed.   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     Nephrolithiasis      Pelvic fracture (H) 11/2014    occured while horseback riding       Past Surgical History   I have reviewed this patient's surgical history and updated it with pertinent information if needed.  Past Surgical History:   Procedure Laterality Date     COLONOSCOPY       COSMETIC SURGERY  11/20/2017     HC CLOSED TX SHOULDER DISLOC W MANIP NO ANESTH       LAPAROSCOPIC CHOLECYSTECTOMY N/A 12/23/2014    Surgeon: Avelino Bryson MD;  Location:  [facility]     REPAIR MOHS Left 1/25/2018    Procedure: REPAIR MOHS;  Left Lower Eyelid Mohs Reconstruction;  Surgeon: Claudia Melvin MD;  Location:  OR       Prior to Admission Medications   Prior to Admission Medications   Prescriptions Last Dose Informant Patient Reported? Taking?   amLODIPine (NORVASC) 5 MG tablet 8/5/2021 at Unknown time Self No Yes   Sig: Take 1 tablet (5 mg) by mouth daily   aspirin 81 MG EC tablet 8/5/2021 at Unknown time Self Yes Yes    Sig: Take 81 mg by mouth daily   atorvastatin (LIPITOR) 40 MG tablet 8/5/2021 at Unknown time Self No Yes   Sig: Take 1 tablet (40 mg) by mouth daily      Facility-Administered Medications: None     Allergies   Allergies   Allergen Reactions     Dilaudid [Hydromorphone]      Agitation, did not help with pain     Morphine Anxiety       Social History   I have reviewed this patient's social history and updated it with pertinent information if needed.   Patient is a non-smoker.  He drinks alcohol occasionally.  No illicit drug use.  He is , spouse at bedside.    Family History   I have reviewed this patient's family history and updated it with pertinent information if needed.   Family History   Problem Relation Age of Onset     Cancer - colorectal Mother 79     Cardiovascular Father 70        Had a carotid endarterectomy at age 70 years.  Tobacco user.     Heart Surgery Brother 66        Alive. Had CABG at age 66 years. Smoker     Diabetes Type 2  Brother      Melanoma Brother        Review of Systems   The 10 point Review of Systems is negative other than noted in the HPI.    Physical Exam   Temp: 98.6  F (37  C) Temp src: Oral BP: (!) 144/95 Pulse: 81   Resp: 16 SpO2: 96 % O2 Device: None (Room air)    Vital Signs with Ranges  Temp:  [98.6  F (37  C)] 98.6  F (37  C)  Pulse:  [80-81] 81  Resp:  [16] 16  BP: (144-162)/(90-95) 144/95  SpO2:  [96 %-98 %] 96 %  225 lbs 0 oz    Constitutional: Well-appearing adult male, lying on the gurney.  Awake, alert, cooperative, no apparent distress.    ENT: Normocephalic, without obvious abnormality, atraumatic, oropharynx with moist mucus membranes.  Eyes pupils are equal, round. Extra occular movements intact.  Normal sclera.     Neck: Supple, symmetrical.  Pulmonary: CTAB, good air movement throughout, no wheezes/rhonchi/rales, no increased WOB.  Cardiovascular: Regular rate and rhythm, normal S1 and S2, and no murmur.  Limbs well-perfused.  GI: Normal,  nondistended.  Skin/Integumen: Clear.  No rashes on exposed skin.  Neuro: CN II-XII grossly intact.  Moves all 4 extremities equally.  No facial droop.  Speech normal and intact.  Psych:  Alert and oriented to person, place, situation.  Normal affect.  Extremities: Atraumatic.  Moves all 4 extremities.    Data   Data reviewed today:  I personally reviewed the EKG showing normal sinus rhythm, no ischemic changes.  Reviewed labs from this encounter.    Recent Labs   Lab 08/06/21 0742 08/06/21 0736   WBC 6.2  --    HGB 15.2  --    MCV 93  --      --    INR 1.08  --      --    POTASSIUM 3.8  --    CHLORIDE 109  --    CO2 26  --    BUN 15  --    CR 1.06  --    ANIONGAP 3  --    ARMANI 8.7  --    * 147*   ALBUMIN 4.0  --    PROTTOTAL 7.5  --    BILITOTAL 3.5*  --    ALKPHOS 69  --    ALT 64  --    AST 37  --    TROPONIN <0.015  --        Recent Labs   Lab 08/06/21 0742 08/06/21 0736   WBC 6.2  --    HGB 15.2  --    MCV 93  --      --    INR 1.08  --      --    POTASSIUM 3.8  --    CHLORIDE 109  --    CO2 26  --    BUN 15  --    CR 1.06  --    ANIONGAP 3  --    ARMANI 8.7  --    * 147*   ALBUMIN 4.0  --    PROTTOTAL 7.5  --    BILITOTAL 3.5*  --    ALKPHOS 69  --    ALT 64  --    AST 37  --    TROPONIN <0.015  --      Recent Results (from the past 24 hour(s))   CT Head w/o Contrast    Narrative    CT OF THE HEAD WITHOUT CONTRAST   8/6/2021 7:50 AM     COMPARISON: Head CT 2/16/2005    HISTORY:  Extremity weakness. Clinical concern for stroke.     TECHNIQUE:  Axial CT images of the head from the skull base to the  vertex were acquired without IV contrast.    FINDINGS:   INTRACRANIAL CONTENTS: No intracranial hemorrhage, extraaxial  collection, or mass effect.  No CT evidence of acute infarct.   Normal  parenchymal density for age. The ventricles and sulci are normal for  age.    VISUALIZED ORBITS/SINUSES/MASTOIDS: No significant orbital  abnormality.  No significant paranasal sinus  mucosal disease. No  significant middle ear or mastoid effusion.    OSSEOUS STRUCTURES/SOFT TISSUES: Right maxillary exostosis. This is  incompletely evaluated. No acute abnormality.      Impression    IMPRESSION:  Normal intracranial contents.  Right maxillary exostosis. This is incompletely evaluated.    Radiation dose for this scan was reduced using automated exposure  control, adjustment of the mA and/or kV according to patient size, or  iterative reconstruction technique    SHERMAN SHARP MD         SYSTEM ID:  [id number]   CTA Head Neck with Contrast    Narrative    CT ANGIOGRAM OF THE HEAD AND NECK WITH CONTRAST  8/6/2021 7:57 AM     COMPARISON: Right hand weakness    HISTORY: Transient ischemic attack (TIA); Code Stroke    TECHNIQUE:    Precontrast localizing scans were followed by CT angiography with an  injection of  70mL Isovue-370 nonionic intravenous contrast material  with scans through the head and neck.  Images were transferred to a  separate 3-D workstation where multiplanar reformations and 3-D images  were created.  Estimates of carotid stenoses are made relative to the  distal internal carotid artery diameters except as noted.      FINDINGS:   HEAD CTA:  ANTERIOR CIRCULATION: No significant stenosis or occlusion. Standard  [place] anatomy.    POSTERIOR CIRCULATION: No significant stenosis or occlusion. The left  vertebral artery is dominant.    ANEURYSM/VASCULAR MALFORMATION: None.    NECK CTA:  RIGHT CAROTID: 30% stenosis in the right ICA based on NASCET criteria.    LEFT CAROTID: No measurable stenosis in the left ICA based on NASCET  criteria.     VERTEBRAL ARTERIES: The vertebral arteries are patent in the neck and  into the head. The left is dominant.     AORTIC ARCH: Classic aortic arch anatomy with no significant stenosis  at the origin of the great vessels.    OTHER:  Normal soft tissues for age. Benign-appearing right maxillary  exostosis. Normal visualized lungs.      Impression     IMPRESSION:    HEAD CTA:  1.  Normal head CTA.  2.  No significant stenosis.  No aneurysm or vascular malformation.    NECK CTA:  1.  No significant stenosis as per NASCET criteria.    Radiation dose for this scan was reduced using automated exposure  control, adjustment of the mA and/or kV according to patient size, or  iterative reconstruction technique    SHERMAN SHARP MD         SYSTEM ID:  [id number]   CT Head Perfusion w Contrast    Narrative    CT BRAIN PERFUSION 8/6/2021 8:07 AM    COMPARISON: Right hand weakness.    HISTORY: Transient ischemic attack (TIA); Code Stroke    TECHNIQUE: Time sequential axial CT images of the head were acquired  during the administration of intravenous contrast ( 50mL Isovue-370).  CTA images of the [place] as well as color perfusion maps of  the brain were created from this time sequential axial source data.    FINDINGS: There are no focal or regional perfusion defects in the  brain.      Impression    IMPRESSION: Normal CT perfusion of the brain.      Radiation dose for this scan was reduced using automated exposure  control, adjustment of the mA and/or kV according to patient size, or  iterative reconstruction technique    SHERMAN SHARP MD         SYSTEM ID:  [id number]   MR Brain w/o & w Contrast    Narrative    MRI OF THE BRAIN WITHOUT AND WITH CONTRAST  8/6/2021 9:54 AM     HISTORY:  Right hand weakness.     COMPARISON: Head CT 8/6/2021.    TECHNIQUE: Axial diffusion-weighted with ADC map, T2-weighted with fat  saturation, T1-weighted and turboFLAIR and coronal T1-weighted images  of the brain were obtained without intravenous contrast.  Following 10  mL Gadavist IV,  axial turboFLAIR and coronal T1-weighted images of  the brain were obtained.     FINDINGS:   INTRACRANIAL CONTENTS: Scattered punctate foci of restricted diffusion  noted in the knob of the left precentral gyrus, in the adjacent left  post central gyrus and in the left superior parietal lobule.  Findings  correlate with the history of right hand weakness. No mass effect or  hemorrhagic transformation.    Mild presumed chronic small vessel ischemic change. Normal ventricles  and sulci. No pathologic enhancement.    SELLA: No significant abnormality accounting for technique.    OSSEOUS STRUCTURES/SOFT TISSUES: No aggressive osseous lesion  involving the calvarium, skull base, or visualized upper cervical  spine. The major intracranial vascular flow voids are maintained.  Susceptibility in the right parietal scalp from a tiny metallic  fragment. This is incidental.    ORBITS: No significant abnormality accounting for technique.    SINUSES/MASTOIDS: No significant paranasal sinus mucosal disease. No  significant middle ear or mastoid effusion.       Impression    IMPRESSION:  1.  Scattered punctate acute infarcts in the knob of the left  precentral gyrus, in the adjacent left postcentral gyrus and in the  left superior parietal lobule.  2.  No mass effect or hemorrhagic transformation.  3.  Mild presumed chronic small vessel ischemic change.    SHERMAN SHARP MD         SYSTEM ID:  [id number]

## 2021-08-06 NOTE — PROGRESS NOTES
08/06/21 1446   Quick Adds   Type of Visit Initial Occupational Therapy Evaluation   Living Environment   People in home spouse   Current Living Arrangements house  (multi level home )   Home Accessibility stairs to enter home;stairs within home   Transportation Anticipated family or friend will provide  (Pt typically drives )   Living Environment Comments Right hand dominant.    Self-Care   Usual Activity Tolerance good   Current Activity Tolerance moderate   Equipment Currently Used at Home none   Instrumental Activities of Daily Living (IADL)   Previous Responsibilities housekeeping;laundry;shopping;yardwork;medication management;finances;driving   Disability/Function   Walking or Climbing Stairs Difficulty no   Dressing/Bathing Difficulty no   Toileting issues no   Doing Errands Independently Difficulty (such as shopping) no   Fall history within last six months no   General Information   Onset of Illness/Injury or Date of Surgery 08/06/21   Referring Physician Jacob Calvo PA   Patient/Family Therapy Goal Statement (OT) Home    Additional Occupational Profile Info/Pertinent History of Current Problem Per chart: Billie Aden is a 65 year old male with history of heart disease and hypertension, 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   Is found to have acute left MCA stroke and is admitted for further management and neurological monitoring. See chart for details.    Cognitive Status Examination   Orientation Status orientation to person, place and time   Sensory   Sensory Comments Pt reported that numbness in RUE has resolved.    Range of Motion Comprehensive   General Range of Motion no range of motion deficits identified   Strength Comprehensive (MMT)   General Manual Muscle Testing (MMT) Assessment upper extremity strength deficits identified   Comment, General Manual Muscle Testing (MMT) Assessment BHARGAV  shoulder flexion and abduction = 5/5 on MMT. RUE shoulder flexion and abduction 4/5 on MMT.    Coordination   Upper Extremity Coordination Right UE impaired   Coordination Comments RUE coordination impaired.    Transfers   Transfers sit-stand transfer;toilet transfer   Sit-Stand Transfer   Sit-Stand Superior (Transfers) set up;verbal cues  (SBA)   Toilet Transfer   Type (Toilet Transfer) sit-stand;stand-sit   Superior Level (Toilet Transfer) set up;verbal cues  (SBA)   Activities of Daily Living   BADL Assessment lower body dressing   Lower Body Dressing Assessment   Superior Level (Lower Body Dressing) set up;verbal cues  (SBA)   Clinical Impression   Criteria for Skilled Therapeutic Interventions Met (OT) yes   OT Diagnosis Decreased independence in I/ADLs   OT Problem List-Impairments impacting ADL problems related to;activity tolerance impaired;strength   ADL comments/analysis Decreased independence in I/ADLs   Assessment of Occupational Performance 1-3 Performance Deficits   Identified Performance Deficits Decreased independence in I/ADLs (Dressing, bathing, toileting)   Planned Therapy Interventions (OT) ADL retraining;IADL retraining;cognition;strengthening;transfer training;neuromuscular re-education;motor coordination training   Clinical Decision Making Complexity (OT) low complexity   Therapy Frequency (OT) Daily   Predicted Duration of Therapy 4 days   Risk & Benefits of therapy have been explained evaluation/treatment results reviewed;care plan/treatment goals reviewed;risks/benefits reviewed;patient   OT Discharge Planning    OT Discharge Recommendation (DC Rec) Home with assist;home with outpatient occupational therapy   OT Rationale for DC Rec At this time recommend 24 hour A/supervision for all I/ADLs. OP OT to address cognition, and safety and independence in I/ADLs and RUE strength. Recommend pt not drive at this time, until a driving evaluation has been completed.    Total Evaluation  Time (Minutes)   Total Evaluation Time (Minutes) 10

## 2021-08-07 ENCOUNTER — APPOINTMENT (OUTPATIENT)
Dept: CARDIOLOGY | Facility: CLINIC | Age: 66
DRG: 066 | End: 2021-08-07
Attending: PHYSICIAN ASSISTANT
Payer: COMMERCIAL

## 2021-08-07 ENCOUNTER — APPOINTMENT (OUTPATIENT)
Dept: CARDIOLOGY | Facility: CLINIC | Age: 66
DRG: 066 | End: 2021-08-07
Payer: COMMERCIAL

## 2021-08-07 ENCOUNTER — APPOINTMENT (OUTPATIENT)
Dept: CT IMAGING | Facility: CLINIC | Age: 66
DRG: 066 | End: 2021-08-07
Payer: COMMERCIAL

## 2021-08-07 ENCOUNTER — APPOINTMENT (OUTPATIENT)
Dept: OCCUPATIONAL THERAPY | Facility: CLINIC | Age: 66
DRG: 066 | End: 2021-08-07
Payer: COMMERCIAL

## 2021-08-07 VITALS
WEIGHT: 225 LBS | HEIGHT: 69 IN | HEART RATE: 70 BPM | TEMPERATURE: 97.9 F | RESPIRATION RATE: 18 BRPM | OXYGEN SATURATION: 96 % | SYSTOLIC BLOOD PRESSURE: 138 MMHG | BODY MASS INDEX: 33.33 KG/M2 | DIASTOLIC BLOOD PRESSURE: 84 MMHG

## 2021-08-07 LAB
ANION GAP SERPL CALCULATED.3IONS-SCNC: 4 MMOL/L (ref 3–14)
BUN SERPL-MCNC: 12 MG/DL (ref 7–30)
CALCIUM SERPL-MCNC: 8.6 MG/DL (ref 8.5–10.1)
CHLORIDE BLD-SCNC: 109 MMOL/L (ref 94–109)
CO2 SERPL-SCNC: 25 MMOL/L (ref 20–32)
CREAT SERPL-MCNC: 0.92 MG/DL (ref 0.66–1.25)
ERYTHROCYTE [DISTWIDTH] IN BLOOD BY AUTOMATED COUNT: 12 % (ref 10–15)
GFR SERPL CREATININE-BSD FRML MDRD: 86 ML/MIN/1.73M2
GLUCOSE BLD-MCNC: 194 MG/DL (ref 70–99)
HCT VFR BLD AUTO: 43.1 % (ref 40–53)
HGB BLD-MCNC: 14.9 G/DL (ref 13.3–17.7)
LVEF ECHO: NORMAL
MCH RBC QN AUTO: 32 PG (ref 26.5–33)
MCHC RBC AUTO-ENTMCNC: 34.6 G/DL (ref 31.5–36.5)
MCV RBC AUTO: 93 FL (ref 78–100)
PLATELET # BLD AUTO: 171 10E3/UL (ref 150–450)
POTASSIUM BLD-SCNC: 3.9 MMOL/L (ref 3.4–5.3)
RBC # BLD AUTO: 4.65 10E6/UL (ref 4.4–5.9)
SODIUM SERPL-SCNC: 138 MMOL/L (ref 133–144)
WBC # BLD AUTO: 5.4 10E3/UL (ref 4–11)

## 2021-08-07 PROCEDURE — 99232 SBSQ HOSP IP/OBS MODERATE 35: CPT | Mod: GC | Performed by: PSYCHIATRY & NEUROLOGY

## 2021-08-07 PROCEDURE — 999N000208 ECHOCARDIOGRAM COMPLETE

## 2021-08-07 PROCEDURE — 250N000013 HC RX MED GY IP 250 OP 250 PS 637: Performed by: PHYSICIAN ASSISTANT

## 2021-08-07 PROCEDURE — 93306 TTE W/DOPPLER COMPLETE: CPT | Mod: 26 | Performed by: INTERNAL MEDICINE

## 2021-08-07 PROCEDURE — 36415 COLL VENOUS BLD VENIPUNCTURE: CPT | Performed by: PHYSICIAN ASSISTANT

## 2021-08-07 PROCEDURE — 93270 REMOTE 30 DAY ECG REV/REPORT: CPT

## 2021-08-07 PROCEDURE — 97535 SELF CARE MNGMENT TRAINING: CPT | Mod: GO

## 2021-08-07 PROCEDURE — 80048 BASIC METABOLIC PNL TOTAL CA: CPT | Performed by: PHYSICIAN ASSISTANT

## 2021-08-07 PROCEDURE — 250N000011 HC RX IP 250 OP 636: Performed by: INTERNAL MEDICINE

## 2021-08-07 PROCEDURE — 250N000009 HC RX 250: Performed by: INTERNAL MEDICINE

## 2021-08-07 PROCEDURE — C8929 TTE W OR WO FOL WCON,DOPPLER: HCPCS

## 2021-08-07 PROCEDURE — 250N000013 HC RX MED GY IP 250 OP 250 PS 637: Performed by: STUDENT IN AN ORGANIZED HEALTH CARE EDUCATION/TRAINING PROGRAM

## 2021-08-07 PROCEDURE — 85027 COMPLETE CBC AUTOMATED: CPT | Performed by: PHYSICIAN ASSISTANT

## 2021-08-07 PROCEDURE — 255N000002 HC RX 255 OP 636: Performed by: INTERNAL MEDICINE

## 2021-08-07 PROCEDURE — 93272 ECG/REVIEW INTERPRET ONLY: CPT | Performed by: INTERNAL MEDICINE

## 2021-08-07 PROCEDURE — 74177 CT ABD & PELVIS W/CONTRAST: CPT

## 2021-08-07 PROCEDURE — 99239 HOSP IP/OBS DSCHRG MGMT >30: CPT | Performed by: STUDENT IN AN ORGANIZED HEALTH CARE EDUCATION/TRAINING PROGRAM

## 2021-08-07 RX ORDER — IOPAMIDOL 755 MG/ML
113 INJECTION, SOLUTION INTRAVASCULAR ONCE
Status: COMPLETED | OUTPATIENT
Start: 2021-08-07 | End: 2021-08-07

## 2021-08-07 RX ORDER — CLOPIDOGREL BISULFATE 75 MG/1
75 TABLET ORAL DAILY
Qty: 30 TABLET | Refills: 0 | Status: SHIPPED | OUTPATIENT
Start: 2021-08-08 | End: 2021-08-07

## 2021-08-07 RX ORDER — CLOPIDOGREL BISULFATE 75 MG/1
75 TABLET ORAL DAILY
Qty: 21 TABLET | Refills: 0 | Status: SHIPPED | OUTPATIENT
Start: 2021-08-08 | End: 2022-07-08

## 2021-08-07 RX ADMIN — IOPAMIDOL 113 ML: 755 INJECTION, SOLUTION INTRAVENOUS at 14:50

## 2021-08-07 RX ADMIN — SODIUM CHLORIDE 74 ML: 9 INJECTION, SOLUTION INTRAVENOUS at 14:50

## 2021-08-07 RX ADMIN — ASPIRIN 81 MG: 81 TABLET, COATED ORAL at 08:17

## 2021-08-07 RX ADMIN — ATORVASTATIN CALCIUM 40 MG: 40 TABLET, FILM COATED ORAL at 08:17

## 2021-08-07 RX ADMIN — CLOPIDOGREL BISULFATE 75 MG: 75 TABLET ORAL at 08:17

## 2021-08-07 RX ADMIN — HUMAN ALBUMIN MICROSPHERES AND PERFLUTREN 9 ML: 10; .22 INJECTION, SOLUTION INTRAVENOUS at 13:51

## 2021-08-07 ASSESSMENT — ACTIVITIES OF DAILY LIVING (ADL)
ADLS_ACUITY_SCORE: 8

## 2021-08-07 NOTE — DISCHARGE INSTRUCTIONS
Your risk factors for stroke or TIA (transient ischemic attack):    Your Risk Factors Your Results Normal Ranges   High blood pressure BP Readings from Last 1 Encounters:   08/07/21 138/84    Less than 120/80   Cholesterol              Total Lab Results   Component Value Date    CHOL 111 08/06/2021    CHOL 128 03/18/2021      Less than 150    Triglycerides   Lab Results   Component Value Date    TRIG 99 08/06/2021    TRIG 165 03/18/2021    Less than 150   LDL Lab Results   Component Value Date    LDL 47 08/06/2021    LDL 50 03/18/2021       Less than 70   HDL Lab Results   Component Value Date    HDL 44 08/06/2021    HDL 45 03/18/2021            Greater than 40 (men)  Greater than 50 (women)   Diabetes Recent Labs   Lab 08/07/21  0823   *    Fasting blood glucose    Smoking/tobacco use  Quit smoking and tobacco   Overweight  Lose 1-2 pounds a week   Lack of exercise  30 minutes moderate activity each day   Other risk factors include carotid (neck) artery disease, atrial fibrillation and stress. You may be on new medicine to treat high blood pressure, cholesterol, diabetes or atrial fibrillation.    Understanding Stroke Booklet given to patient. Please refer to booklet for further information.    Stroke warning signs and symptoms - CALL 911 right away for:  - Sudden numbness or weakness in the face, arm or leg (often on one side of the body).  - Sudden confusion or trouble understanding what is going on.  - Sudden blurred or decreased vision in one or both eyes.  - Sudden trouble speaking, loss of balance, dizziness or problems with coordination.  - Sudden, severe headache for no reason.  - Fainting or seizures.  - Symptoms may go away then come back suddenly.      THE PATIENT [facility]ARNING [facility] WILL CALL YOU AT 10:30 AM 8/8/21 FOR ADDITIONAL STROKE TEACHING    IF YOU HAVE ADDITIONAL QUESTIONS OR CONCERNS BEFORE NEUROLOGY CALLS YOU TO MAKE FOLLOW UP APPOINTMENT, PLEASE CALL [facility] @ [phone] AND ASK  FOR CHARGE RN

## 2021-08-07 NOTE — PLAN OF CARE
PT: Orders received. Chart reviewed and discussed with OT. Per discussion with OT, patient is independent with mobility/ambulation with no balance deficits noted.  Defer discharge recommendations to OT.  Will complete orders.

## 2021-08-07 NOTE — PLAN OF CARE
Pt here with L. Central Gyrus and L. Parietal Lobe CVA. Alert, oriented x4. Neurologically intact ex: intermittent RUE pronator drift and numbness with a slightly slower response with finger to nose when compared to the LUE. VSS on RA, permissive hypertension. Tele NSR. Regular diet, thin liquids. Takes pills whole. Up independently. Denies pain. NS infusing at 100 mL/hour. Pt scoring green on the Aggression Stop Light Tool. Plan for echo, PT evaluation. Discharge pending, likely home with assist.

## 2021-08-07 NOTE — PLAN OF CARE
Pt here with L precentral gyrus/parietal lobe stroke. A&O x4. Neuros intact except RUE finger to nose slower than left but improving during the day.  VSS on RA. Tele NSR. Tolerating regular diet, thin liquids. Takes pills whole with water. Up IDPT. Denies pain. Pt scoring green  on the Aggression Stop Light Tool. Discharge instructions, medications and follow up reviewed with patient. Portable monitor placed on patient. Patient discharged with belongings via wife.

## 2021-08-07 NOTE — PROVIDER NOTIFICATION
Writer spoke with Neurology Dr. Gordon , took verbal order to change neuro checks to every 4 hours. Patient likely can discharge, pending echo.

## 2021-08-07 NOTE — CONSULTS
[facility]    Stroke Consult Note    Reason for Consult: Stroke Code     Chief Complaint: Extremity Weakness    Interim:  Pt states he feels his hand has fully recovered.   He notes that he was previously on Eliquis for 6 months after March 2020 when he was found to have an arterial clot in his liver. Inferior mesenteric, splenic, and portal venous thrombosis. Potential related to COVID as he was sick for 2 weeks prior to this development      HPI  Billie Aden is a 65 year old male presents with LKW at 0430 8/6/21 with R hand clumsiness and weakness. He noted he was unable to grab and hold things. While in ED pt with rapid improvement in sxs and saw some resolution of weakness.     Thrombolytic Treatment   Not given due to patient/family declined.    Endovascular Treatment  Not initiated due to absence of proximal vessel occlusion    Impression   Acute ischemic stroke of L MCA  due to embolic stroke of undetermined source (ESUS)      Recommendations  - Neurochecks and Vital Signs every 4 hours   - Permissive HTN; goal SBP < 160 mmHg  - Daily aspirin 81 mg for secondary stroke prevention  - Plavix (clopidogrel) 75 mg PO Daily for 21 days   - After 21 days ASA 81mg monotherapy indefinitely    - Patient is interested in DEION study.   - Statin: Atorvastatin 80mg qday  - TTE (with Bubble Study if age 60 yrs or less)  - Telemetry, EKG  - Bedside Glucose Monitoring  - A1c, Lipid Panel, Troponin x 3  - PT/OT/SLP  - Stroke Education  - Euthermia, Euglycemia    - Due to previous hypercoag/clots in the portal system,   - CT CAP    Patient Follow-up    - in 8 weeks with stroke clinic KEIKO (any stroke KEIKO) at virtual stroke clinic. Patient will be contacted by virtual stroke clinic team after discharge.    Thank you for this consult. No further stroke evaluation is recommended, so we will sign off. Please contact us with any additional questions.    The Stroke Staff is Dr. Jeter.    Caleb  "MD Heidi  Vascular Neurology Fellow  To page me or covering stroke neurology team member, click here: [facility]COM   Choose \"On Call\" tab at top, then search dropdown box for \"Neurology Adult\", select location, press Enter, then look for stroke/neuro ICU/telestroke.    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     Nephrolithiasis      Pelvic fracture (H) 11/2014    occured while horseback riding     Past Surgical History   Past Surgical History:   Procedure Laterality Date     COLONOSCOPY       COSMETIC SURGERY  11/20/2017     HC CLOSED TX SHOULDER DISLOC W MANIP NO ANESTH       LAPAROSCOPIC CHOLECYSTECTOMY N/A 12/23/2014    Surgeon: Avelino Bryson MD;  Location: [facility]     REPAIR MOHS Left 1/25/2018    Procedure: REPAIR MOHS;  Left Lower Eyelid Mohs Reconstruction;  Surgeon: Claudia Melvin MD;  Location:  OR     Medications   Home Meds  Prior to Admission medications    Medication Sig Start Date End Date Taking? Authorizing Provider   amLODIPine (NORVASC) 5 MG tablet Take 1 tablet (5 mg) by mouth daily 2/3/21  Yes Jacob Graham MD   aspirin 81 MG EC tablet Take 81 mg by mouth daily   Yes Reported, Patient   atorvastatin (LIPITOR) 40 MG tablet Take 1 tablet (40 mg) by mouth daily 2/3/21  Yes Jacob Graham MD       Scheduled Meds      Infusion Meds      PRN Meds      Allergies   Allergies   Allergen Reactions     Dilaudid [Hydromorphone]      Agitation, did not help with pain     Morphine Anxiety     Family History   Family History   Problem Relation Age of Onset     Cancer - " colorectal Mother 79     Cardiovascular Father 70        Had a carotid endarterectomy at age 70 years.  Tobacco user.     Heart Surgery Brother 66        Alive. Had CABG at age 66 years. Smoker     Diabetes Type 2  Brother      Melanoma Brother      Social History   Social History     Tobacco Use     Smoking status: Never Smoker     Smokeless tobacco: Never Used   Substance Use Topics     Alcohol use: Yes     Alcohol/week: 0.0 standard drinks     Comment: occasionally     Drug use: No       Review of Systems   The 5 point Review of Systems is negative other than noted in the HPI or here.        PHYSICAL EXAMINATION  Temp:  [97.2  F (36.2  C)-98.3  F (36.8  C)] 97.8  F (36.6  C)  Pulse:  [63-76] 63  Resp:  [16-18] 18  BP: (136-153)/(83-90) 143/87  SpO2:  [94 %-96 %] 96 %     General Exam  General:  patient lying in bed without any acute distress    HEENT:  normocephalic/atraumatic  Cardio:  regular rate  Pulmonary:  no respiratory distress  Abdomen:  non-distended  Extremities:  no edema  Skin:  intact     Neuro Exam  Mental Status:  follows commands  Cranial Nerves:  PERRL, EOMI with normal smooth pursuit, facial movements symmetric, hearing not formally tested but intact to conversation, no dysarthria  Motor:  normal muscle tone and bulk, no abnormal movements, able to move all limbs spontaneously  Sensory:  light touch sensation intact and symmetric throughout upper and lower extremities  Coordination:  no ataxia or tremor observed  Station/Gait:  deferred          Dysphagia Screen  Per Nursing    Stroke Scales    NIHSS  Interval baseline (08/06/21 0755)   Interval Comments     1a. Level of Consciousness 0-->Alert, keenly responsive   1b. LOC Questions 0-->Answers both questions correctly   1c. LOC Commands 0-->Performs both tasks correctly   2.   Best Gaze 0-->Normal   3.   Visual 0-->No visual loss   4.   Facial Palsy 0-->Normal symmetrical movements   5a. Motor Arm, Left 0-->No drift, limb holds 90 (or 45)  degrees for full 10 secs   5b. Motor Arm, Right 1-->Drift, limb holds 90 (or 45) degrees, but drifts down before full 10 secs, does not hit bed or other support   6a. Motor Leg, Left 0-->No drift, leg holds 30 degree position for full 5 secs   6b. Motor Leg, right 0-->No drift, leg holds 30 degree position for full 5 secs   7.   Limb Ataxia 0-->Absent   8.   Sensory 0-->Normal, no sensory loss   9.   Best Language 0-->No aphasia, normal   10. Dysarthria 0-->Normal   11. Extinction and Inattention  0-->No abnormality   Total 1 (08/06/21 1159)       Modified Cloud Score (Pre-morbid)  0-No symptoms    Imaging  I personally reviewed all imaging; relevant findings per HPI.     Lab Results Data   CBC  Recent Labs   Lab 08/07/21  0823 08/06/21  0742   WBC 5.4 6.2   RBC 4.65 4.67   HGB 14.9 15.2   HCT 43.1 43.2    180     Basic Metabolic Panel    Recent Labs   Lab 08/07/21  0823 08/06/21  0742 08/06/21  0736    138  --    POTASSIUM 3.9 3.8  --    CHLORIDE 109 109  --    CO2 25 26  --    BUN 12 15  --    CR 0.92 1.06  --    * 147* 147*   ARMANI 8.6 8.7  --      Liver Panel  Recent Labs   Lab 08/06/21  0742   PROTTOTAL 7.5   ALBUMIN 4.0   BILITOTAL 3.5*   ALKPHOS 69   AST 37   ALT 64     INR    Recent Labs   Lab Test 08/06/21  0742 03/28/20  0623 03/28/20  0002   INR 1.08 1.14 1.11      Lipid Profile    Recent Labs   Lab Test 08/06/21  1217 03/18/21  0822 08/30/19  0814 07/28/15  1815 09/30/14  0844 09/12/13  1135   CHOL 111 128 121 183 188 214*   HDL 44 45 42 44 41 46   LDL 47 50 55 123 113 150*   TRIG 99 165* 120 79 168* 88   CHOLHDLRATIO  --   --   --  4.2 4.6 4.7     A1C    Recent Labs   Lab Test 08/06/21  1217 08/30/19  0814 09/30/14  0844   A1C 5.9* 5.5 5.5     Troponin I  No results for input(s): TROPI in the last 168 hours.

## 2021-08-07 NOTE — DISCHARGE SUMMARY
[facility]  Hospitalist Discharge Summary      Date of Admission:  8/6/2021  Date of Discharge:  8/7/2021  Discharging Provider: Qamar Mcdonald MD    Discharge Diagnoses   Acute ischemic stroke of left MCA  Hypertension  Dyslipidemia  Hx inferior mesenteric vein thrombosis  Hx fatty liver disease    Follow-ups Needed After Discharge   Follow-up Appointments     Follow-up and recommended labs and tests       Follow up with primary care provider, Jacob Graham MD, within   7 days for hospital follow- up.  No follow up labs or test are needed.      Follow up in 8 weeks with stroke clinic [facility] (any stroke KEIKO) at virtual   stroke clinic. Patient will be contacted by virtual stroke clinic team   after discharge.           Unresulted Labs Ordered in the Past 30 Days of this Admission       No orders found for last 31 day(s).        These results will be followed up by N/A    Discharge Disposition   Discharged to home  Condition at discharge: Stable      Hospital Course   Billie Aden is a 65-year-old male with a history of portal vein thrombosis, hypertension who was admitted to the hospital on 8/6/2021 with acute ischemic stroke of his left MCA.  Tenecteplase was offered, but patient declined as his symptoms were resolving.  MRI demonstrated scattered punctate acute infarcts in the left precentral frontal gyrus, left postcentral gyrus, and left superior parietal lobe. He was seen by OT who recommended outpatient OT, and no driving until driving evaluation completed. Neurology started DAPT with plavix for 21 days, followed by ASA monotherapy. He was continued on Atorvastatin 40mg daily.  He should plan to follow up with neurology in 8 weeks. Follow up in PCP in 1-2 weeks for hospital follow up.    Consultations This Hospital Stay   NEUROLOGY IP CONSULT  PHYSICAL THERAPY ADULT IP CONSULT  OCCUPATIONAL THERAPY ADULT IP CONSULT  SPEECH LANGUAGE PATH ADULT IP CONSULT  CARE MANAGEMENT /  SOCIAL WORK IP CONSULT  PATIENT LEARNING CENTER IP CONSULT    Code Status   Full Code    Time Spent on this Encounter   I, Qamar Mcdonald MD, personally saw the patient today and spent greater than 30 minutes discharging this patient.       Qamar Mcdonald MD  [place] [address] [facility]  [address] [address]  Phone: [phone]  ______________________________________________________________________    Physical Exam   Vital Signs: Temp: 97.8  F (36.6  C) Temp src: Oral BP: (!) 143/87 Pulse: 63   Resp: 18 SpO2: 96 % O2 Device: None (Room air)    Weight: 225 lbs 0 oz  Constitutional: Awake, alert, cooperative, no apparent distress  Respiratory: Clear to auscultation bilaterally, no crackles or wheezing  Cardiovascular: Regular rate and rhythm, normal S1 and S2, and no murmur noted  GI: Normal bowel sounds, soft, non-distended, non-tender  Skin/Integumen: No rashes, no cyanosis, no edema  Other:          Primary Care Physician   Jacob Graham MD    Discharge Orders      Occupational Therapy Referral      Reason for your hospital stay    You were in the hospital due to an acute stroke.     Follow-up and recommended labs and tests     Follow up with primary care provider, Jacob Graham MD, within 7 days for hospital follow- up.  No follow up labs or test are needed.      Follow up in 8 weeks with stroke clinic [facility] (any stroke KEIKO) at virtual stroke clinic. Patient will be contacted by virtual stroke clinic team after discharge.     Activity    Your activity upon discharge: activity as tolerated and no driving until a driving evaluation is completed.     Diet    Follow this diet upon discharge: Orders Placed This Encounter      Regular Diet Adult       Significant Results and Procedures   Most Recent 3 CBC's:  Recent Labs   Lab Test 08/07/21  0823 08/06/21  0742 03/18/21  0822   WBC 5.4 6.2 6.8   HGB 14.9 15.2 16.0   MCV 93 93 95    180 188        Most Recent 3 BMP's:  Recent Labs   Lab Test 08/07/21  0823 08/06/21  0742 08/06/21  0736 03/18/21  0822    138  --  139   POTASSIUM 3.9 3.8  --  4.0   CHLORIDE 109 109  --  105   CO2 25 26  --  29   BUN 12 15  --  13   CR 0.92 1.06  --  1.11   ANIONGAP 4 3  --  5   ARMANI 8.6 8.7  --  9.1   * 147* 147* 125*   ,   Results for orders placed or performed during the hospital encounter of 08/06/21   CTA Head Neck with Contrast    Narrative    CT ANGIOGRAM OF THE HEAD AND NECK WITH CONTRAST  8/6/2021 7:57 AM     COMPARISON: Right hand weakness    HISTORY: Transient ischemic attack (TIA); Code Stroke    TECHNIQUE:    Precontrast localizing scans were followed by CT angiography with an  injection of  70mL Isovue-370 nonionic intravenous contrast material  with scans through the head and neck.  Images were transferred to a  separate 3-D workstation where multiplanar reformations and 3-D images  were created.  Estimates of carotid stenoses are made relative to the  distal internal carotid artery diameters except as noted.      FINDINGS:   HEAD CTA:  ANTERIOR CIRCULATION: No significant stenosis or occlusion. Standard  [place] anatomy.    POSTERIOR CIRCULATION: No significant stenosis or occlusion. The left  vertebral artery is dominant.    ANEURYSM/VASCULAR MALFORMATION: None.    NECK CTA:  RIGHT CAROTID: 30% stenosis in the right ICA based on NASCET criteria.    LEFT CAROTID: No measurable stenosis in the left ICA based on NASCET  criteria.     VERTEBRAL ARTERIES: The vertebral arteries are patent in the neck and  into the head. The left is dominant.     AORTIC ARCH: Classic aortic arch anatomy with no significant stenosis  at the origin of the great vessels.    OTHER:  Normal soft tissues for age. Benign-appearing right maxillary  exostosis. Normal visualized lungs.      Impression    IMPRESSION:    HEAD CTA:  1.  Normal head CTA.  2.  No significant stenosis.  No aneurysm or vascular  malformation.    NECK CTA:  1.  No significant stenosis as per NASCET criteria.    Radiation dose for this scan was reduced using automated exposure  control, adjustment of the mA and/or kV according to patient size, or  iterative reconstruction technique    SHERMAN SHARP MD         SYSTEM ID:  [id number]   CT Head Perfusion w Contrast    Narrative    CT BRAIN PERFUSION 8/6/2021 8:07 AM    COMPARISON: Right hand weakness.    HISTORY: Transient ischemic attack (TIA); Code Stroke    TECHNIQUE: Time sequential axial CT images of the head were acquired  during the administration of intravenous contrast ( 50mL Isovue-370).  CTA images of the [place] as well as color perfusion maps of  the brain were created from this time sequential axial source data.    FINDINGS: There are no focal or regional perfusion defects in the  brain.      Impression    IMPRESSION: Normal CT perfusion of the brain.      Radiation dose for this scan was reduced using automated exposure  control, adjustment of the mA and/or kV according to patient size, or  iterative reconstruction technique    SHERMAN SHARP MD         SYSTEM ID:  [id number]   CT Head w/o Contrast    Narrative    CT OF THE HEAD WITHOUT CONTRAST   8/6/2021 7:50 AM     COMPARISON: Head CT 2/16/2005    HISTORY:  Extremity weakness. Clinical concern for stroke.     TECHNIQUE:  Axial CT images of the head from the skull base to the  vertex were acquired without IV contrast.    FINDINGS:   INTRACRANIAL CONTENTS: No intracranial hemorrhage, extraaxial  collection, or mass effect.  No CT evidence of acute infarct.   Normal  parenchymal density for age. The ventricles and sulci are normal for  age.    VISUALIZED ORBITS/SINUSES/MASTOIDS: No significant orbital  abnormality.  No significant paranasal sinus mucosal disease. No  significant middle ear or mastoid effusion.    OSSEOUS STRUCTURES/SOFT TISSUES: Right maxillary exostosis. This is  incompletely evaluated. No acute  abnormality.      Impression    IMPRESSION:  Normal intracranial contents.  Right maxillary exostosis. This is incompletely evaluated.    Radiation dose for this scan was reduced using automated exposure  control, adjustment of the mA and/or kV according to patient size, or  iterative reconstruction technique    SHERMAN SHARP MD         SYSTEM ID:  [id number]   MR Brain w/o & w Contrast    Narrative    MRI OF THE BRAIN WITHOUT AND WITH CONTRAST  8/6/2021 9:54 AM     HISTORY:  Right hand weakness.     COMPARISON: Head CT 8/6/2021.    TECHNIQUE: Axial diffusion-weighted with ADC map, T2-weighted with fat  saturation, T1-weighted and turboFLAIR and coronal T1-weighted images  of the brain were obtained without intravenous contrast.  Following 10  mL Gadavist IV,  axial turboFLAIR and coronal T1-weighted images of  the brain were obtained.     FINDINGS:   INTRACRANIAL CONTENTS: Scattered punctate foci of restricted diffusion  noted in the knob of the left precentral gyrus, in the adjacent left  post central gyrus and in the left superior parietal lobule. Findings  correlate with the history of right hand weakness. No mass effect or  hemorrhagic transformation.    Mild presumed chronic small vessel ischemic change. Normal ventricles  and sulci. No pathologic enhancement.    SELLA: No significant abnormality accounting for technique.    OSSEOUS STRUCTURES/SOFT TISSUES: No aggressive osseous lesion  involving the calvarium, skull base, or visualized upper cervical  spine. The major intracranial vascular flow voids are maintained.  Susceptibility in the right parietal scalp from a tiny metallic  fragment. This is incidental.    ORBITS: No significant abnormality accounting for technique.    SINUSES/MASTOIDS: No significant paranasal sinus mucosal disease. No  significant middle ear or mastoid effusion.       Impression    IMPRESSION:  1.  Scattered punctate acute infarcts in the knob of the left  precentral gyrus, in the  adjacent left postcentral gyrus and in the  left superior parietal lobule.  2.  No mass effect or hemorrhagic transformation.  3.  Mild presumed chronic small vessel ischemic change.    SHERMAN SHARP MD         SYSTEM ID:  [id number]   CT Chest/Abdomen/Pelvis w Contrast    Narrative    CT CHEST/ABDOMEN/PELVIS WITH CONTRAST 8/7/2021 3:12 PM    CLINICAL HISTORY: Hypercoagulable state. Evaluate for malignancy as a  contributing factor.    TECHNIQUE: CT scan of the chest, abdomen, and pelvis was performed  following injection of IV contrast. Multiplanar reformats were  obtained. Dose reduction techniques were used.     CONTRAST: 113mL Isovue-370    COMPARISON: Cardiac CT 8/4/2015 and CT abdomen and pelvis 3/27/2020.    FINDINGS:   LUNGS AND PLEURA: Noncalcified 3 mm left lower lobe nodule stable  since 2015 and benign (series 5 image 143). Scattered calcified  granulomas. No focal consolidation. Clear central airways. No pleural  effusion.    MEDIASTINUM/AXILLAE: Normal.    CORONARY ARTERY CALCIFICATION: Moderate.    HEPATOBILIARY: No focal hepatic mass. Cholecystectomy. No biliary  dilatation.    PANCREAS: Normal.    SPLEEN: Normal.    ADRENAL GLANDS: Normal.    KIDNEYS/BLADDER: Unchanged 3 mm nonobstructing right intrarenal  calculus. No significant mass, or hydronephrosis. There are simple or  benign cysts. No follow up is needed.    BOWEL: Diffuse moderate diverticulosis of the colon. No acute  inflammatory change. No obstruction.     PELVIC ORGANS: Normal.    ADDITIONAL FINDINGS: Previous thrombosis of the inferior mesenteric,  portosplenic confluence left colic and sigmoid veins are no longer  present. A posterior right hepatic lobe portal vein branch is not seen  and is chronically occluded (series 3 image 152).    MUSCULOSKELETAL: Small fat-containing umbilical and right inguinal  hernia.      Impression    IMPRESSION:  1.  No evidence of malignancy in the chest, abdomen or pelvis.  2.  Previous acute appearing  portal and mesenteric vein thrombosis are  no longer present. A posterior right hepatic lobe portal vein branch  is chronically occluded.  3.  Moderate diffuse colonic diverticulosis.  4.  Unchanged 3 mm nonobstructing right intrarenal stone.  5.  Fat-containing small right inguinal and umbilical hernias.    YG APONTE MD         SYSTEM ID:  [id number]   Echocardiogram Complete with Bubble Study     Value    LVEF  60-65%    [facility]    [id number]  [id number]  [id number]^^GWEN^[place]     [facility]  Echocardiography Laboratory  [address] [place] [id number]     Name: RICHARD FERNANDES  MRN: [id number]  : 1955  Study Date: 2021 01:13 PM  Age: 65 yrs  Gender: Male  Patient Location: [facility]  Reason For Study: CVA  Ordering Physician: GWEN MORGAN  Referring Physician: Gwen Graham  Performed By: John Ware     BSA: 2.2 m2  Height: 69 in  Weight: 225 lb  HR: 66  BP: 133/83 mmHg  ______________________________________________________________________________  Procedure  Complete Portable Bubble Echo Adult. Optison (NDC #2570-1280) given  intravenously.  ______________________________________________________________________________  Interpretation Summary     Left ventricular systolic function is normal.  The visual ejection fraction is 60-65%.  No regional wall motion abnormalities noted.  Bubble study with valsalva attempted but there is suboptimal filling of the  right ventricle. Therefore, it is non diagnostic.  The study was technically difficult. There is no comparison study available.  ______________________________________________________________________________  Left Ventricle  The left ventricle is normal in size. Left ventricular systolic function is  normal. The visual ejection fraction is 60-65%. Diastolic Doppler findings  (E/E' ratio and/or other parameters) suggest left ventricular filling  pressures are normal. No regional wall  motion abnormalities noted.     Right Ventricle  The right ventricle is normal size. The right ventricular systolic function is  normal.     Atria  The left atrium is moderately dilated. The right atrium is mildly dilated.  Bubble study with valsalva attempted but there is suboptimal filling of the  right ventricle. Therefore, it is non diagnostic.     Mitral Valve  There is mild mitral annular calcification. The mitral valve leaflets are  mildly thickened. There is trace mitral regurgitation.     Tricuspid Valve  There is trace tricuspid regurgitation. The right ventricular systolic  pressure is approximated at 5.0 mmHg plus the right atrial pressure. IVC  diameter <2.1 cm collapsing >50% with sniff suggests a normal RA pressure of 3  mmHg.     Aortic Valve  There is mild trileaflet aortic sclerosis. No aortic regurgitation is present.  No aortic stenosis is present.     Pulmonic Valve  Normal pulmonic valve.     Vessels  Mild aortic root dilatation.     Pericardium  There is no pericardial effusion.     Rhythm  Sinus rhythm was noted.  ______________________________________________________________________________  MMode/2D Measurements & Calculations  IVSd: 0.95 cm     LVIDd: 5.3 cm  LVIDs: 2.8 cm  LVPWd: 1.1 cm  FS: 47.1 %  LV mass(C)d: 209.6 grams  LV mass(C)dI: 96.5 grams/m2  Ao root diam: 4.1 cm  LA dimension: 4.2 cm  asc Aorta Diam: 3.5 cm  LA/Ao: 1.0  LVOT diam: 2.3 cm  LVOT area: 4.0 cm2  LA Volume (BP): 93.2 ml  LA Volume Index (BP): 42.9 ml/m2  RWT: 0.42     Doppler Measurements & Calculations  MV E max asya: 57.6 cm/sec  MV A max asya: 43.7 cm/sec  MV E/A: 1.3  MV dec slope: 209.3 cm/sec2  PA acc time: 0.15 sec  TR max asya: 111.3 cm/sec  TR max P.0 mmHg  E/E' av.5  Lateral E/e': 6.4  Medial E/e': 6.6     ______________________________________________________________________________  Report approved by: Tone Naqvi 2021 03:30 PM             Discharge Medications   Current Discharge  Medication List        START taking these medications    Details   clopidogrel (PLAVIX) 75 MG tablet Take 1 tablet (75 mg) by mouth daily  Qty: 21 tablet, Refills: 0    Associated Diagnoses: Cerebrovascular accident (CVA), unspecified mechanism (H)           CONTINUE these medications which have NOT CHANGED    Details   amLODIPine (NORVASC) 5 MG tablet Take 1 tablet (5 mg) by mouth daily  Qty: 90 tablet, Refills: 1    Comments: For Profile Only - patient will call to fill  Associated Diagnoses: Essential hypertension, benign      aspirin 81 MG EC tablet Take 81 mg by mouth daily      atorvastatin (LIPITOR) 40 MG tablet Take 1 tablet (40 mg) by mouth daily  Qty: 90 tablet, Refills: 3    Comments: For Profile Only - patient will call to fill  Associated Diagnoses: Hyperlipidemia LDL goal <100           Allergies   Allergies   Allergen Reactions    Dilaudid [Hydromorphone]      Agitation, did not help with pain    Morphine Anxiety

## 2021-08-07 NOTE — PROGRESS NOTES
CM Note:    CM consulted for discharge planning.  Per chart review pt mobilizing independent.  He lives with his spouse.  OP OT recommended.  Has a PCP.  No CM interventions identified at this time.    Katie Griffin RN, BSN, PHN  Inpatient Care Coordination

## 2021-08-07 NOTE — PROVIDER NOTIFICATION
Text page sent to [facility] Dr. Salvador regarding discharge orders and CT results/further follow up.     Call back from provider: ok to discharge

## 2021-08-08 ENCOUNTER — HOSPITAL ENCOUNTER (OUTPATIENT)
Dept: EDUCATION SERVICES | Facility: CLINIC | Age: 66
End: 2021-08-08
Attending: PHYSICIAN ASSISTANT
Payer: COMMERCIAL

## 2021-08-08 NOTE — CONSULTS
Stroke Education Note    The following information has been reviewed with the patient and family:    1. Warning signs of stroke    2. Calling 911 if having warning signs of stroke    3. All modifiable risk factors: hypertension, CAD, atrial fib, diabetes, hypercholesterolemia, smoking, substance abuse, diet, physical inactivity, obesity, sleep apnea.    4. Patient's risk factors for stroke which include: HTN, obesity, heart disease    5. Follow-up plan for after discharge    6. Discharge medications which include: Plavix, ASA, amlodipine, Lipitor    In addition, the above information was given to the patient and family in writing as a part of the [facility] Stroke Class Handout.    Learner's response to risk factors / lifestyle modification education: Desire to change Ability to change     Whitney Palafox RN

## 2021-08-08 NOTE — DISCHARGE SUMMARY
Occupational Therapy Discharge Summary    Reason for therapy discharge:    Discharged to home with outpatient therapy.    Progress towards therapy goal(s). See goals on Care Plan in [facility] electronic health record for goal details.  Goals partially met.  Barriers to achieving goals:   discharge from facility.    Therapy recommendation(s):    Continued therapy is recommended.  Rationale/Recommendations:  Pt presents below baseline for functional cognition and would benefit from 24/7 supervision at home for safety and sequencing IADLs. Pt would benefit from outpatient OT to progress towards independence. .

## 2021-08-09 ENCOUNTER — PATIENT OUTREACH (OUTPATIENT)
Dept: CARE COORDINATION | Facility: CLINIC | Age: 66
End: 2021-08-09

## 2021-08-09 ENCOUNTER — TELEPHONE (OUTPATIENT)
Dept: FAMILY MEDICINE | Facility: CLINIC | Age: 66
End: 2021-08-09

## 2021-08-09 DIAGNOSIS — Z71.89 OTHER SPECIFIED COUNSELING: ICD-10-CM

## 2021-08-09 NOTE — PROGRESS NOTES
[facility]: Post-Discharge Note  SITUATION                                                      Admission:    Admission Date: 08/06/21   Reason for Admission: Cerebrovascular accident (CVA), unspecified mechanism (H  Discharge:   Discharge Date: 08/07/21  Discharge Diagnosis: Cerebrovascular accident (CVA), unspecified mechanism (H    BACKGROUND                                                      Billie Aden is a 65-year-old male with a history of portal vein thrombosis, hypertension who was admitted to the hospital on 8/6/2021 with acute ischemic stroke of his left MCA.  Tenecteplase was offered, but patient declined as his symptoms were resolving.  MRI demonstrated scattered punctate acute infarcts in the left precentral frontal gyrus, left postcentral gyrus, and left superior parietal lobe. He was seen by OT who recommended outpatient OT, and no driving until driving evaluation completed. Neurology started DAPT with plavix for 21 days, followed by ASA monotherapy. He was continued on Atorvastatin 40mg daily.  He should plan to follow up with neurology in 8 weeks. Follow up in PCP in 1-2 weeks for hospital follow up.       ASSESSMENT      Discharge Assessment  Do you feel your condition is stable enough to be safe at home until your provider visit?: Yes  Does the patient have their discharge instructions? : Yes  Does the patient have questions regarding their discharge instructions? : No  Does the patient have all of their medications?: Yes  Discharge follow-up appointment scheduled within 14 calendar days? : Yes           PLAN                                                      Outpatient Plan:     Follow up with primary care provider, Jacob Graham MD, within 7 days for hospital follow- up.  No follow up labs or test are needed.       Follow up in 8 weeks with stroke clinic [facility] (any stroke KEIKO) at virtual stroke clinic. Patient will be contacted by virtual stroke clinic team after discharge.          Future Appointments   Date Time Provider Department Center   8/27/2021  1:30 PM Amberly Ortiz, OTR [facility]         For any urgent concerns, please contact our 24 hour nurse triage line: [phone] ([phone])         Yajaira Santamaria

## 2021-08-09 NOTE — TELEPHONE ENCOUNTER
Reason for Call:  Other appointment    Detailed comments: Patient was in the hospital this weekend and needs a hospital follow up appointment. He would only like to have this appointment with Dr. Graham. He did put in a request on [employer]. The soonest appointment available would be on 8/24.    Phone Number Patient can be reached at: Cell number on file:    Telephone Information:   Mobile [phone]       Best Time: any time    Can we leave a detailed message on this number? YES    Call taken on 8/9/2021 at 8:41 AM by Duyen Núñez

## 2021-08-09 NOTE — PROGRESS NOTES
Background: Care Coordination referral placed from [facility] discharge report for reason of patient meeting criteria for a TCM outreach call by [facility] team.    Assessment: Upon chart review, [facility] Team member will cancel/close the referral for TCM outreach due to reason below:    Patient has been contacted by a clinic RN or provider within [facility] for reason of discussing hospital follow up plan of care and answering questions patient may have related to discharge instructions.     Plan: Care Coordination referral for TCM outreach canceled to minimize duplicative efforts.    Tracy Silva MA  [facility] Care Resource [place], [facility]

## 2021-08-27 ENCOUNTER — OFFICE VISIT (OUTPATIENT)
Dept: FAMILY MEDICINE | Facility: CLINIC | Age: 66
End: 2021-08-27
Payer: COMMERCIAL

## 2021-08-27 ENCOUNTER — HOSPITAL ENCOUNTER (OUTPATIENT)
Dept: OCCUPATIONAL THERAPY | Facility: CLINIC | Age: 66
Setting detail: THERAPIES SERIES
End: 2021-08-27
Attending: STUDENT IN AN ORGANIZED HEALTH CARE EDUCATION/TRAINING PROGRAM
Payer: COMMERCIAL

## 2021-08-27 VITALS
TEMPERATURE: 97.1 F | DIASTOLIC BLOOD PRESSURE: 76 MMHG | RESPIRATION RATE: 16 BRPM | OXYGEN SATURATION: 95 % | HEART RATE: 69 BPM | WEIGHT: 224.6 LBS | BODY MASS INDEX: 33.17 KG/M2 | SYSTOLIC BLOOD PRESSURE: 118 MMHG

## 2021-08-27 DIAGNOSIS — I63.9 CEREBROVASCULAR ACCIDENT (CVA), UNSPECIFIED MECHANISM (H): ICD-10-CM

## 2021-08-27 DIAGNOSIS — E78.5 HYPERLIPIDEMIA LDL GOAL <100: ICD-10-CM

## 2021-08-27 DIAGNOSIS — R73.01 IFG (IMPAIRED FASTING GLUCOSE): ICD-10-CM

## 2021-08-27 DIAGNOSIS — I63.9 CEREBROVASCULAR ACCIDENT (CVA), UNSPECIFIED MECHANISM (H): Primary | ICD-10-CM

## 2021-08-27 LAB
FASTING STATUS PATIENT QL REPORTED: YES
GLUCOSE BLD-MCNC: 115 MG/DL (ref 70–99)

## 2021-08-27 PROCEDURE — 36415 COLL VENOUS BLD VENIPUNCTURE: CPT | Performed by: INTERNAL MEDICINE

## 2021-08-27 PROCEDURE — 97165 OT EVAL LOW COMPLEX 30 MIN: CPT | Mod: GO

## 2021-08-27 PROCEDURE — 82947 ASSAY GLUCOSE BLOOD QUANT: CPT | Performed by: INTERNAL MEDICINE

## 2021-08-27 PROCEDURE — 99214 OFFICE O/P EST MOD 30 MIN: CPT | Performed by: INTERNAL MEDICINE

## 2021-08-27 NOTE — PROGRESS NOTES
Vandana Wise is a 65 year old who presents for the following health issues     HPI       Hospital Follow-up Visit:    Hospital/Nursing Home/IP Rehab Facility: [facility]  Date of Admission: 8/6/2021  Date of Discharge: 8/7/2021  Reason(s) for Admission:     Acute ischemic stroke of left MCA  Hypertension  Dyslipidemia  Hx inferior mesenteric vein thrombosis  Hx fatty liver disease      Was your hospitalization related to COVID-19? No   Problems taking medications regularly:  None  Medication changes since discharge: Yes - updated in med list   Problems adhering to non-medication therapy:  None    Summary of hospitalization:  [facility] discharge summary reviewed  Diagnostic Tests/Treatments reviewed.  Follow up needed: neurology   Other Healthcare Providers Involved in Patient s Care:         None  Update since discharge: improved. Post Discharge Medication Reconciliation: discharge medications reconciled, continue medications without change.  Plan of care communicated with patient           Cerebrovascular accident (CVA), unspecified mechanism (H)   Billie Aden has had near complete resolution of his symptoms.  He is wearing the holter monitor currently.  Echocardiogram showed no concerning findings.  CT Angiogram shows no significant stenosis in carotid or vertebral arteries.  He was recommended to start Plavix and transition to aspirin 325 mg.  Has regained nearly all coordination and sensation in his right upper extremity and lower extremity.  Hyperlipidemia LDL goal <100    Continues on atorvastatin 40 mg.      Review of Systems   Constitutional, HEENT, cardiovascular, pulmonary, GI, , musculoskeletal, neuro, skin, endocrine and psych systems are negative, except as otherwise noted.      Objective    /76   Pulse 69   Temp 97.1  F (36.2  C) (Temporal)   Resp 16   Wt 101.9 kg (224 lb 9.6 oz)   SpO2 95%   BMI 33.17 kg/m    Body mass index is  33.17 kg/m .  Physical Exam   GENERAL: healthy, alert and no distress  EYES: Eyes grossly normal to inspection   HENT: Dose and mouth without ulcers or lesions  NECK: no adenopathy, no asymmetry   RESP: lungs clear to auscultation - no rales, rhonchi or wheezes  CV: regular rate and rhythm   ABDOMEN: soft, nontender,    MS: no gross musculoskeletal defects noted, no edema  SKIN: no suspicious lesions or rashes  NEURO: Normal strength, senstaion and tone, mentation intact and speech normal  PSYCH: mentation appears normal, affect normal/bright    Patient Instructions   (I63.9) Cerebrovascular accident (CVA), unspecified mechanism (H)  (primary encounter diagnosis)  Comment: Plan to follow up in occupational therapy today as well as follow up in neurology - [facility] [phone].  Occupational therapy planned for today.  Plan:     (E78.5) Hyperlipidemia LDL goal <100  Comment: Continue atorvastatin at current dose  Plan:

## 2021-08-27 NOTE — PROGRESS NOTES
[facility]          OUTPATIENT OCCUPATIONAL THERAPY  EVALUATION  PLAN OF TREATMENT FOR OUTPATIENT REHABILITATION  (COMPLETE FOR INITIAL CLAIMS ONLY)  Patient's Last Name, First Name, M.I.  YOB: 1955  Billie Aden                        Provider's Name  [facility] Medical Record No.  [id number]                               Onset Date:     08/06/21   Start of Care Date:     08/27/21   Type:     ___PT   _X_OT   ___SLP Medical Diagnosis:     CVA (ischemic stroke of L MCA)                          OT Diagnosis:     Decreased ease of completing ADLs/IADLs Visits from SOC:  1   _________________________________________________________________________________  Plan of Treatment/Functional Goals:  ADL training, IADL training, Cognitive performance testing, Community/work reintegration, Coordination training, Self care/Home management, Strengthening, Visual perception                    Goals  Goal Identifier: IADLs/Community Mobility  Goal Description: Pt will demonstrate visual and divided attention WFL for safe functional and community mobility tasks (navigating new environments, driving, grocery shopping) by scoring WFLs on 3/3 Dynavision modes.  Target Date: 08/27/21  Date Met: 08/27/21                    Amberly Ortiz OTR/L          I CERTIFY THE NEED FOR THESE SERVICES FURNISHED UNDER        THIS PLAN OF TREATMENT AND WHILE UNDER MY CARE     (Physician co-signature of this document indicates review and certification of the therapy plan).                 ,    Certification date from: 08/27/21, Certification date to: 08/27/21               Referring Physician: Qamar Mcdonald MD (pt no longer followed by hospital-based provider, cert sent to PCP Jacob Graham MD for co-sign)     Initial Assessment        See Epic Evaluation      Start Of Care Date:  08/27/21

## 2021-08-27 NOTE — RESULT ENCOUNTER NOTE
Martinez Wise,    I have had the opportunity to review your recent results and an interpretation is as follows:  Your fasting blood glucose is a bit elevated, but not to the level of diabetes    It was great to see you again and I will keep my eyes open for notes from the neurologist    Sincerely,  Jacob Graham MD

## 2021-08-27 NOTE — PROGRESS NOTES
08/27/21 1300   Quick Adds   Quick Adds Certification   General Information   Start Of Care Date 08/27/21   Referring Physician Qamar Mcdonald MD   Orders Evaluate and treat as indicated   Orders Date 08/07/21   Medical Diagnosis CVA (ischemic stroke of L MCA)   Onset of Illness/Injury or Date of Surgery 08/06/21   Precautions/Limitations No known precautions/limitations   Surgical/Medical History Reviewed Yes   Additional Occupational Profile Info/Pertinent History of Current Problem Billie Aden is a 65-year-old male with a history of portal vein thrombosis, hypertension who was admitted to the hospital on 8/6/2021 with acute ischemic stroke of his left MCA.  Tenecteplase was offered, but patient declined as his symptoms were resolving.  MRI demonstrated scattered punctate acute infarcts in the left precentral frontal gyrus, left postcentral gyrus, and left superior parietal lobe. He was seen by OT who recommended outpatient OT, and no driving until driving evaluation completed. Per PCP follow-up this morning (8/27/21), pt reports nearly complete recovery from symptoms. Medical history includes: Arthritis; Basal cell carcinoma of skin; Cholelithiasis;Heart disease; Hypertension; Inferior mesenteric vein thrombosis; Nephrolithiasis ; Pelvis fracture; Nonalcoholic fatty liver disease; Coronary artery calcification; Hyperlipidemia; CKD, stage 3; Impaired fasting glucose; IFG; Hyperbilirubinemia.   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[employer])   Current Living Environment House  (with spouse)   Number of Stairs Within Home   (3 flights of stairs)   Prior Level - Transfers Independent   Prior Level - Ambulation Independent   Prior Level - ADLS Independent   Prior Responsibilities - IADL Meal Preparation;Housekeeping;Laundry;Shopping;Yardwork;Medication management;Finances;Driving   Prior  Level Comments IND with ADLs/IADLs   Patient/family Goals Statement To confirm no lingering deficits, safety with driving   Pain   Patient currently in pain No   Fall Risk Screen   Fall screen completed by OT   Have you fallen 2 or more times in the past year? No   Have you fallen and had an injury in the past year? No   Abuse Screen (yes response referral indicated)   Feels Unsafe at Home or Work/School no   Feels Threatened by Someone no   Does Anyone Try to Keep You From Having Contact with Others or Doing Things Outside Your Home? no   Physical Signs of Abuse Present no   Cognitive Status Examination   Orientation Orientation to person, place and time   Level of Consciousness Alert   Follows Commands and Answers Questions 100% of the time;Able to follow single-step instructions;Able to follow multistep instructions   Personal Safety and Judgment Intact   Memory Impaired   Memory Comments Per Short Blessed Test cognitive screen, pt gets 3/5 details on delayed recal. Pt notes STM has declined slightly over time with age, doesn't feel it has been impacted by stroke. Also notes he has dyslexia and has always had some trouble with recalling of information in this manner. So significant concerns at this time.   Attention No deficits were identified   Organization/Problem Solving No deficits were identified   Executive Function No deficits were identified   Cognitive Comment See memory comments above. Pt scored 4 on Short Blessed Test, indicating normal to minimal impairment. Pt feels he is at baseline and cognition is not of significant concern at this time. The Symbol Digit Modalities Test (SDMT) is a screening instrument commonly used  to assess neurological skills like attention, perceptual speed, motor speed, and visual scanning. In 90 seconds, the pt completed 40 symbols (WNL; target score is 38-44 for patient's demographics).   Visual Perception   Visual Perception Wears glasses  (trifocals)   Visual Perception  Comments Pt denies changes in vision. No apparent deficits per observation of Dynavision performance. Assessed patient reaction time and visual scanning/attention with Dynavision light board. On Mode A: 65 hits (WNL is 52+, reaction times about 0.90 sec in all quadrants). Mode B (1.0 sec intervals): 65 hits (WNL is 42+; accuracy approximately 80-85% in all quadrants). Mode B divided attention (1.0 sec intervals): 51 hits with 10/10 numbers (WNL is 35+ hits with 9+/10 numbers; about 60% accuracy on lower two quadrants and 80% accuracy in top two quadrants). Pt demonstrating appropriate reaction time, visual scanning, and attention skills required for safe return to driving.   Sensation   Sensation Comments Pt denies n/t. Reports had numbness initially but this has resolved.   Range of Motion (ROM)   ROM Comments BUE WFL per gross ROM screen.   Strength   Strength Comments MMT of SH FL/EX, ABD/ADD, IR/ER; EL FL/EX; wrist FL/EX. Pt 4+/5 on R side SH FL, ABD. All others on R side and all on L side 5/5. Educated in incorporating light resistance training to promote full strength.   Hand Strength   Hand Dominance Right   Left Hand  (pounds) 106 pounds   Right Hand  (pounds) 116 pounds   Left Lateral Pinch (pounds) 26 pounds   Right Lateral Pinch (pounds) 24 pounds   Left Three Point Pinch (pounds) 17 pounds   Right Three Point Pinch (pounds) 21 pounds   Hand Strength Comments L three point pinch 1-2 SD below the mean. All other areas tested WFL. Pt feels he is at baseline; no significant concerns at this time.   Coordination   Upper Extremity Coordination Right UE impaired   Gross Motor Coordination GMC intact per observation of performance, finger to nose.   Fine Motor Coordination FMC slightly slowed with finger opposition in L hand, light shaking in both hands intermittently (pt notes this happens time to time but doesn't impact functional peformance). Denies difficulties with writing and other FMC activities.    Left Hand, Nine Hole Peg Test (seconds) 22.9   Right Hand, Nine Hole Peg Test (seconds) 23.01   Functional Limitations Decreased speed   Coordination Comments L hand 9 hole peg test WNL. R hand 1-2 SD below the mean. Pt has challenged himself to completed FMC activities at home, including hammering about 2400 nails into the deck he built over the past 2 weeks. Encouraged to continue with FMC activities to promote full restoration of speed/dexterity in R hand (pt reports near baseline).   Balance   Balance Comments Pt with good balance with static and dynamic reaching tasks as well as ambulation.   Functional Mobility   Ambulation IND   Transfer Skill   Level of [place]: Transfers independent   Bathing   Level of [place] - Bathing independent   Upper Body Dressing   Level of [place]: Dress Upper Body independent   Lower Body Dressing   Level of [place]: Dress Lower Body independent   Toileting   Level of [place]: Toilet independent   Grooming   Level of [place]: Grooming independent   Eating/Self-Feeding   Level of [place]: Eating independent   Activity Tolerance   Activity Tolerance Pt reports he was more tired when he first came home from the hospital but energy has significantly improved. Has been taking about a 40-45 min walk each day.   Instrumental Activities of Daily Living Assessment   IADL Assessment/Observations Medication management: manages IND, takes pills from bottles. Has system down for AM/PM and does not forget to take his medications.   Meal Planning/Preparation Assists with simple meal prep, including making pancakes on the weekend. Will wash dishes after meals. Denies difficulty with these tasks.   Home/Financial Management Assists with household management and money management. Denies challenges with these activities.   Communication/Computer Use Noted difficulty texting/using phone initially after stroke but has since recovered these skills and denies  continued difficulty.   Community Mobility Was recommended not to drive when leaving hospital until further evaluated by OP OT. Per today's assessment, pt demonstrates the appropriate physical, visual, and cognitive skills from an OT perspective for safe return to driving. Educated pt on results of assessment and that OT will defer final decisions regarding return to driving to his physician. Pt verbalizes understanding.   Planned Therapy Interventions   Planned Therapy Interventions ADL training;IADL training;Cognitive performance testing;Community/work reintegration;Coordination training;Self care/Home management;Strengthening;Visual perception    OT Goal 1   Goal Identifier IADLs/Community Mobility   Goal Description Pt will demonstrate visual and divided attention WFL for safe functional and community mobility tasks (navigating new environments, driving, grocery shopping) by scoring WFLs on 3/3 Dynavision modes.   Target Date 08/27/21   Date Met 08/27/21   Clinical Impression   Criteria for Skilled Therapeutic Interventions Met Evaluation only   OT Diagnosis Decreased ease of completing ADLs/IADLs   Influenced by the following impairments strength, coordination   Assessment of Occupational Performance 1-3 Performance Deficits   Identified Performance Deficits household/yardwork tasks, leisure/community integration   Clinical Decision Making (Complexity) Low complexity   Risks and Benefits of Treatment have been explained. Yes   Patient, Family & other staff in agreement with plan of care Yes   Clinical Impression Comments Pt near baseline with minimal residual strength and fine motor deficits in RUE. Pt has returned to IND with ADLs/IADLs and has demonstrated the appropriate physical, visual, and cognitive skills for safe return to driving from an OT perspective. Final decision regarding return to driving deferred to PCP. Pt provided recommendations for improving strength and coordination in RUE for home program  but will not be seen for additional skilled OT services at this time.   Education Assessment   Barriers To Learning No Barriers   Preferred Learning Style Listening;Reading;Demonstration;Pictures/video   Therapy Certification   Certification date from 08/27/21   Certification date to 08/27/21   Certification I certify the need for these services furnished under this plan of treatment and while under my care.  (Physician co-signature of this document indicates review and certification of the therapy plan)   Total Evaluation Time   OT [place], Low Complexity Minutes (97995) 40       Amberly Ortiz OTR/L

## 2021-08-27 NOTE — PATIENT INSTRUCTIONS
(I63.9) Cerebrovascular accident (CVA), unspecified mechanism (H)  (primary encounter diagnosis)  Comment: Plan to follow up in occupational therapy today as well as follow up in neurology - [facility] [phone].  Occupational therapy planned for today.  Plan:     (E78.5) Hyperlipidemia LDL goal <100  Comment: Continue atorvastatin at current dose  Plan:      Impaired fasting glucose  Comment; recommend recheck fasting blood glucose

## 2021-08-31 ENCOUNTER — TELEPHONE (OUTPATIENT)
Dept: NEUROLOGY | Facility: CLINIC | Age: 66
End: 2021-08-31

## 2021-08-31 NOTE — TELEPHONE ENCOUNTER
Pt. Referred to [facility] RCT Blood Pressure study. Patient not eligible at this time due to well-controlled BP. If BP becomes > 140, pt. may be eligible.

## 2021-09-14 DIAGNOSIS — I10 ESSENTIAL HYPERTENSION, BENIGN: ICD-10-CM

## 2021-09-16 RX ORDER — AMLODIPINE BESYLATE 5 MG/1
TABLET ORAL
Qty: 90 TABLET | Refills: 2 | Status: SHIPPED | OUTPATIENT
Start: 2021-09-16 | End: 2022-06-06

## 2021-10-05 ENCOUNTER — APPOINTMENT (OUTPATIENT)
Dept: URBAN - METROPOLITAN AREA CLINIC 256 | Age: 66
Setting detail: DERMATOLOGY
End: 2021-10-05

## 2021-10-05 VITALS — RESPIRATION RATE: 16 BRPM | WEIGHT: 220 LBS | HEIGHT: 69 IN

## 2021-10-05 DIAGNOSIS — L82.1 OTHER SEBORRHEIC KERATOSIS: ICD-10-CM

## 2021-10-05 DIAGNOSIS — L57.8 OTHER SKIN CHANGES DUE TO CHRONIC EXPOSURE TO NONIONIZING RADIATION: ICD-10-CM

## 2021-10-05 DIAGNOSIS — L82.0 INFLAMED SEBORRHEIC KERATOSIS: ICD-10-CM

## 2021-10-05 DIAGNOSIS — D22 MELANOCYTIC NEVI: ICD-10-CM

## 2021-10-05 DIAGNOSIS — Z85.828 PERSONAL HISTORY OF OTHER MALIGNANT NEOPLASM OF SKIN: ICD-10-CM

## 2021-10-05 DIAGNOSIS — D18.0 HEMANGIOMA: ICD-10-CM

## 2021-10-05 DIAGNOSIS — L81.4 OTHER MELANIN HYPERPIGMENTATION: ICD-10-CM

## 2021-10-05 DIAGNOSIS — L73.8 OTHER SPECIFIED FOLLICULAR DISORDERS: ICD-10-CM

## 2021-10-05 DIAGNOSIS — I78.8 OTHER DISEASES OF CAPILLARIES: ICD-10-CM

## 2021-10-05 PROBLEM — D18.01 HEMANGIOMA OF SKIN AND SUBCUTANEOUS TISSUE: Status: ACTIVE | Noted: 2021-10-05

## 2021-10-05 PROBLEM — D22.5 MELANOCYTIC NEVI OF TRUNK: Status: ACTIVE | Noted: 2021-10-05

## 2021-10-05 PROCEDURE — 99213 OFFICE O/P EST LOW 20 MIN: CPT | Mod: 25

## 2021-10-05 PROCEDURE — OTHER LIQUID NITROGEN: OTHER

## 2021-10-05 PROCEDURE — OTHER COUNSELING: OTHER

## 2021-10-05 PROCEDURE — 17110 DESTRUCT B9 LESION 1-14: CPT

## 2021-10-05 PROCEDURE — OTHER REASSURANCE: OTHER

## 2021-10-05 ASSESSMENT — LOCATION SIMPLE DESCRIPTION DERM
LOCATION SIMPLE: UPPER BACK
LOCATION SIMPLE: RIGHT UPPER BACK
LOCATION SIMPLE: LEFT THIGH
LOCATION SIMPLE: RIGHT SCALP
LOCATION SIMPLE: INFERIOR FOREHEAD
LOCATION SIMPLE: LEFT CHEEK
LOCATION SIMPLE: RIGHT CHEEK
LOCATION SIMPLE: RIGHT FOREARM

## 2021-10-05 ASSESSMENT — LOCATION ZONE DERM
LOCATION ZONE: TRUNK
LOCATION ZONE: FACE
LOCATION ZONE: SCALP
LOCATION ZONE: LEG
LOCATION ZONE: ARM

## 2021-10-05 ASSESSMENT — LOCATION DETAILED DESCRIPTION DERM
LOCATION DETAILED: RIGHT DISTAL DORSAL FOREARM
LOCATION DETAILED: LEFT SUPERIOR CENTRAL MALAR CHEEK
LOCATION DETAILED: RIGHT MID-UPPER BACK
LOCATION DETAILED: RIGHT INFERIOR PREAURICULAR CHEEK
LOCATION DETAILED: SUPERIOR THORACIC SPINE
LOCATION DETAILED: RIGHT CENTRAL FRONTAL SCALP
LOCATION DETAILED: LEFT ANTERIOR DISTAL THIGH
LOCATION DETAILED: RIGHT SUPERIOR MEDIAL UPPER BACK
LOCATION DETAILED: INFERIOR MID FOREHEAD
LOCATION DETAILED: RIGHT CENTRAL MALAR CHEEK
LOCATION DETAILED: RIGHT INFERIOR MEDIAL UPPER BACK
LOCATION DETAILED: RIGHT MEDIAL UPPER BACK

## 2021-10-05 NOTE — PROCEDURE: LIQUID NITROGEN
Consent: The patient's verbal consent was obtained including but not limited to risks of crusting, scabbing, blistering, scarring, darker or lighter pigmentary change, recurrence, incomplete removal and infection.
Show Applicator Variable?: Yes
Add 52 Modifier (Optional): no
Post-Care Instructions: I reviewed with the patient in detail post-care instructions. Patient is to wear sunprotection, and avoid picking at any of the treated lesions. Pt may apply Vaseline to crusted or scabbing areas.
Pared With?: 15 blade
Medical Necessity Information: It is in your best interest to select a reason for this procedure from the list below. All of these items fulfill various CMS LCD requirements except the new and changing color options.
Duration Of Freeze Thaw-Cycle (Seconds): 2
Number Of Freeze-Thaw Cycles: 3 freeze-thaw cycles
Detail Level: Simple
Medical Necessity Clause: This procedure was medically necessary because the lesions that were treated were: traumatized when grooming

## 2021-10-24 ENCOUNTER — HEALTH MAINTENANCE LETTER (OUTPATIENT)
Age: 66
End: 2021-10-24

## 2022-04-06 ENCOUNTER — APPOINTMENT (OUTPATIENT)
Dept: URBAN - METROPOLITAN AREA CLINIC 256 | Age: 67
Setting detail: DERMATOLOGY
End: 2022-04-07

## 2022-04-06 VITALS — RESPIRATION RATE: 15 BRPM | HEIGHT: 69 IN | WEIGHT: 220 LBS

## 2022-04-06 DIAGNOSIS — L82.0 INFLAMED SEBORRHEIC KERATOSIS: ICD-10-CM

## 2022-04-06 PROCEDURE — 99212 OFFICE O/P EST SF 10 MIN: CPT | Mod: 25

## 2022-04-06 PROCEDURE — OTHER MIPS QUALITY: OTHER

## 2022-04-06 PROCEDURE — OTHER LIQUID NITROGEN: OTHER

## 2022-04-06 PROCEDURE — OTHER ADDITIONAL NOTES: OTHER

## 2022-04-06 PROCEDURE — OTHER COUNSELING: OTHER

## 2022-04-06 PROCEDURE — 17110 DESTRUCT B9 LESION 1-14: CPT

## 2022-04-06 ASSESSMENT — LOCATION DETAILED DESCRIPTION DERM
LOCATION DETAILED: LEFT CLAVICULAR NECK
LOCATION DETAILED: RIGHT CLAVICULAR NECK
LOCATION DETAILED: RIGHT CLAVICULAR SKIN
LOCATION DETAILED: RIGHT SUPERIOR PARIETAL SCALP

## 2022-04-06 ASSESSMENT — LOCATION ZONE DERM
LOCATION ZONE: NECK
LOCATION ZONE: SCALP
LOCATION ZONE: TRUNK

## 2022-04-06 ASSESSMENT — LOCATION SIMPLE DESCRIPTION DERM
LOCATION SIMPLE: LEFT ANTERIOR NECK
LOCATION SIMPLE: SCALP
LOCATION SIMPLE: RIGHT ANTERIOR NECK
LOCATION SIMPLE: RIGHT CLAVICULAR SKIN

## 2022-04-06 NOTE — HPI: EVALUATION OF SKIN LESION(S)
What Type Of Note Output Would You Prefer (Optional)?: Standard Output
Have Your Spot(S) Been Treated In The Past?: has not been treated
Hpi Title: Evaluation of Skin Lesions
Additional History: Moles on back he would like checked. On shoulders, one on his. The spots have been there for years, they seem to be getting larger and rougher. He picks at the seldomly and they do get irritated sometimes.

## 2022-04-06 NOTE — PROCEDURE: LIQUID NITROGEN
Medical Necessity Information: It is in your best interest to select a reason for this procedure from the list below. All of these items fulfill various CMS LCD requirements except the new and changing color options.
Add 52 Modifier (Optional): no
Detail Level: Detailed
Show Spray Paint Technique Variable?: Yes
Spray Paint Text: The liquid nitrogen was applied to the skin utilizing a spray paint frosting technique.
Consent: The patient's consent was obtained including but not limited to risks of crusting, scabbing, blistering, scarring, darker or lighter pigmentary change, recurrence, incomplete removal and infection.
Post-Care Instructions: I reviewed with the patient in detail post-care instructions. Patient is to wear sunprotection, and avoid picking at any of the treated lesions. Pt may apply Vaseline to crusted or scabbing areas.
Medical Necessity Clause: This procedure was medically necessary because the lesions that were treated were:

## 2022-04-06 NOTE — PROCEDURE: ADDITIONAL NOTES
Detail Level: Simple
Render Risk Assessment In Note?: no
Additional Notes: A clinical assistant was present for the exam. Care instructions of treated site were explained to the patient in detail. Told patient to call with any concerns or questions. The patient verbalized understanding and agreement of the education provided and the treatment plan. Encouraged patient to schedule a follow up appointment right after visit. At the end of the visit, all questions had been answered and the patient was satisfied with the visit.

## 2022-04-10 ENCOUNTER — HEALTH MAINTENANCE LETTER (OUTPATIENT)
Age: 67
End: 2022-04-10

## 2022-06-06 DIAGNOSIS — E78.5 HYPERLIPIDEMIA LDL GOAL <100: ICD-10-CM

## 2022-06-06 DIAGNOSIS — I10 ESSENTIAL HYPERTENSION, BENIGN: ICD-10-CM

## 2022-06-06 RX ORDER — AMLODIPINE BESYLATE 5 MG/1
5 TABLET ORAL DAILY
Qty: 7 TABLET | Refills: 0 | Status: SHIPPED | OUTPATIENT
Start: 2022-06-06 | End: 2022-06-13

## 2022-06-06 RX ORDER — ATORVASTATIN CALCIUM 40 MG/1
40 TABLET, FILM COATED ORAL DAILY
Qty: 7 TABLET | Refills: 0 | Status: SHIPPED | OUTPATIENT
Start: 2022-06-06 | End: 2022-07-08

## 2022-06-06 NOTE — TELEPHONE ENCOUNTER
Prescription approved per [facility] Refill Protocol.  Gladis Muro, RN  [facility] RN Triage Team

## 2022-06-10 DIAGNOSIS — I10 ESSENTIAL HYPERTENSION, BENIGN: ICD-10-CM

## 2022-06-13 RX ORDER — AMLODIPINE BESYLATE 5 MG/1
TABLET ORAL
Qty: 30 TABLET | Refills: 0 | Status: SHIPPED | OUTPATIENT
Start: 2022-06-13 | End: 2022-07-08

## 2022-06-13 NOTE — TELEPHONE ENCOUNTER
Prescription approved per [facility] Refill Protocol.    Javed Manning RN  [facility]

## 2022-07-07 DIAGNOSIS — I10 ESSENTIAL HYPERTENSION, BENIGN: ICD-10-CM

## 2022-07-08 ENCOUNTER — VIRTUAL VISIT (OUTPATIENT)
Dept: FAMILY MEDICINE | Facility: CLINIC | Age: 67
End: 2022-07-08
Payer: COMMERCIAL

## 2022-07-08 DIAGNOSIS — N18.30 STAGE 3 CHRONIC KIDNEY DISEASE, UNSPECIFIED WHETHER STAGE 3A OR 3B CKD (H): ICD-10-CM

## 2022-07-08 DIAGNOSIS — E78.5 HYPERLIPIDEMIA LDL GOAL <100: ICD-10-CM

## 2022-07-08 DIAGNOSIS — I10 ESSENTIAL HYPERTENSION, BENIGN: ICD-10-CM

## 2022-07-08 DIAGNOSIS — Z12.5 SCREENING FOR PROSTATE CANCER: Primary | ICD-10-CM

## 2022-07-08 PROCEDURE — 99214 OFFICE O/P EST MOD 30 MIN: CPT | Mod: 95 | Performed by: INTERNAL MEDICINE

## 2022-07-08 RX ORDER — ATORVASTATIN CALCIUM 40 MG/1
40 TABLET, FILM COATED ORAL DAILY
Qty: 90 TABLET | Refills: 3 | Status: SHIPPED | OUTPATIENT
Start: 2022-07-08 | End: 2023-07-28

## 2022-07-08 RX ORDER — AMLODIPINE BESYLATE 5 MG/1
5 TABLET ORAL DAILY
Qty: 90 TABLET | Refills: 3 | Status: SHIPPED | OUTPATIENT
Start: 2022-07-08 | End: 2023-07-28

## 2022-07-08 NOTE — PROGRESS NOTES
Billie is a 66 year old who is being evaluated via a billable video visit.      How would you like to obtain your AVS? Sofía  If the video visit is dropped, the invitation should be resent by: Text to cell phone: SOFÍA  Will anyone else be joining your video visit? No          Subjective   Billie is a 66 year old, presenting for the following health issues:  Recheck Medication      History of Present Illness       CKD: He uses over the counter pain medication, including ibprofine, a few times a month.    Hypertension: He presents for follow up of hypertension.  He does check blood pressure  regularly outside of the clinic. Outpatient blood pressures have not been over 140/90. He does not follow a low salt diet.     Vascular Disease:  He presents for follow up of vascular disease.  He never takes nitroglycerin. He takes daily aspirin.         Hyperlipidemia LDL goal <100  Essential hypertension, benign  Stage 3 chronic kidney disease, unspecified whether stage 3a or 3b CKD (H)     Review of Systems   Constitutional, HEENT, cardiovascular, pulmonary, GI, , musculoskeletal, neuro, skin, endocrine and psych systems are negative, except as otherwise noted.      Objective    Vitals - Patient Reported  Systolic (Patient Reported): 133  Diastolic (Patient Reported): 78  Pulse (Patient Reported): 74      Vitals:  No vitals were obtained today due to virtual visit.    Physical Exam   GENERAL: Healthy, alert and no distress  EYES: Eyes grossly normal to inspection.  No discharge or erythema, or obvious scleral/conjunctival abnormalities.  RESP: No audible wheeze, cough, or visible cyanosis.  No visible retractions or increased work of breathing.    SKIN: Visible skin clear. No significant rash, abnormal pigmentation or lesions.  NEURO: Cranial nerves grossly intact.  Mentation and speech appropriate for age.  PSYCH: Mentation appears normal, affect normal/bright, judgement and insight intact, normal speech and appearance  well-groomed.    (Z12.5) Screening for prostate cancer  (primary encounter diagnosis)  Comment:   Plan: Prostate Specific Antigen Screen            (E78.5) Hyperlipidemia LDL goal <100  Comment: Recheck fasting lipid panel and continue atorvastatin   Plan: atorvastatin (LIPITOR) 40 MG tablet, Lipid         panel reflex to direct LDL Fasting,         Comprehensive metabolic panel, CBC with         platelets            (I10) Essential hypertension, benign  Comment: blood pressure is stable at home - conitnue amlodipine  Plan: amLODIPine (NORVASC) 5 MG tablet, Lipid panel         reflex to direct LDL Fasting, Comprehensive         metabolic panel, CBC with platelets            (N18.30) Stage 3 chronic kidney disease, unspecified whether stage 3a or 3b CKD (H)  Comment: Recheck labs today  Plan: Lipid panel reflex to direct LDL Fasting,         Comprehensive metabolic panel, CBC with         platelets                   Video-Visit Details    Video Start Time: 2:45 PM    Type of service:  Video Visit    Video End Time:2:57 PM    Originating Location (pt. Location): Home    Distant Location (provider location):  [facility]     Platform used for Video Visit: [employer]

## 2022-07-11 RX ORDER — AMLODIPINE BESYLATE 5 MG/1
TABLET ORAL
Qty: 30 TABLET | Refills: 0 | OUTPATIENT
Start: 2022-07-11

## 2022-07-13 ENCOUNTER — LAB (OUTPATIENT)
Dept: LAB | Facility: CLINIC | Age: 67
End: 2022-07-13
Payer: COMMERCIAL

## 2022-07-13 DIAGNOSIS — Z12.5 SCREENING FOR PROSTATE CANCER: ICD-10-CM

## 2022-07-13 DIAGNOSIS — N18.30 STAGE 3 CHRONIC KIDNEY DISEASE, UNSPECIFIED WHETHER STAGE 3A OR 3B CKD (H): ICD-10-CM

## 2022-07-13 DIAGNOSIS — I10 ESSENTIAL HYPERTENSION, BENIGN: ICD-10-CM

## 2022-07-13 DIAGNOSIS — E78.5 HYPERLIPIDEMIA LDL GOAL <100: ICD-10-CM

## 2022-07-13 LAB
ERYTHROCYTE [DISTWIDTH] IN BLOOD BY AUTOMATED COUNT: 11.8 % (ref 10–15)
HCT VFR BLD AUTO: 44.7 % (ref 40–53)
HGB BLD-MCNC: 15.6 G/DL (ref 13.3–17.7)
MCH RBC QN AUTO: 33.2 PG (ref 26.5–33)
MCHC RBC AUTO-ENTMCNC: 34.9 G/DL (ref 31.5–36.5)
MCV RBC AUTO: 95 FL (ref 78–100)
PLATELET # BLD AUTO: 198 10E3/UL (ref 150–450)
RBC # BLD AUTO: 4.7 10E6/UL (ref 4.4–5.9)
WBC # BLD AUTO: 5.8 10E3/UL (ref 4–11)

## 2022-07-13 PROCEDURE — G0103 PSA SCREENING: HCPCS

## 2022-07-13 PROCEDURE — 36415 COLL VENOUS BLD VENIPUNCTURE: CPT

## 2022-07-13 PROCEDURE — 85027 COMPLETE CBC AUTOMATED: CPT

## 2022-07-13 PROCEDURE — 80053 COMPREHEN METABOLIC PANEL: CPT

## 2022-07-13 PROCEDURE — 80061 LIPID PANEL: CPT

## 2022-07-13 PROCEDURE — 82043 UR ALBUMIN QUANTITATIVE: CPT

## 2022-07-14 LAB
ALBUMIN SERPL-MCNC: 4.2 G/DL (ref 3.4–5)
ALP SERPL-CCNC: 66 U/L (ref 40–150)
ALT SERPL W P-5'-P-CCNC: 58 U/L (ref 0–70)
ANION GAP SERPL CALCULATED.3IONS-SCNC: 5 MMOL/L (ref 3–14)
AST SERPL W P-5'-P-CCNC: 42 U/L (ref 0–45)
BILIRUB SERPL-MCNC: 5 MG/DL (ref 0.2–1.3)
BUN SERPL-MCNC: 18 MG/DL (ref 7–30)
CALCIUM SERPL-MCNC: 9 MG/DL (ref 8.5–10.1)
CHLORIDE BLD-SCNC: 108 MMOL/L (ref 94–109)
CHOLEST SERPL-MCNC: 106 MG/DL
CO2 SERPL-SCNC: 27 MMOL/L (ref 20–32)
CREAT SERPL-MCNC: 1.04 MG/DL (ref 0.66–1.25)
CREAT UR-MCNC: 274 MG/DL
FASTING STATUS PATIENT QL REPORTED: YES
GFR SERPL CREATININE-BSD FRML MDRD: 79 ML/MIN/1.73M2
GLUCOSE BLD-MCNC: 122 MG/DL (ref 70–99)
HDLC SERPL-MCNC: 45 MG/DL
LDLC SERPL CALC-MCNC: 45 MG/DL
MICROALBUMIN UR-MCNC: 28 MG/L
MICROALBUMIN/CREAT UR: 10.22 MG/G CR (ref 0–17)
NONHDLC SERPL-MCNC: 61 MG/DL
POTASSIUM BLD-SCNC: 4 MMOL/L (ref 3.4–5.3)
PROT SERPL-MCNC: 7.3 G/DL (ref 6.8–8.8)
PSA SERPL-MCNC: 1.11 NG/ML (ref 0–4.5)
SODIUM SERPL-SCNC: 140 MMOL/L (ref 133–144)
TRIGL SERPL-MCNC: 81 MG/DL

## 2022-07-16 ENCOUNTER — TELEPHONE (OUTPATIENT)
Dept: FAMILY MEDICINE | Facility: CLINIC | Age: 67
End: 2022-07-16

## 2022-07-16 DIAGNOSIS — K76.0 NAFLD (NONALCOHOLIC FATTY LIVER DISEASE): ICD-10-CM

## 2022-07-16 DIAGNOSIS — E80.6 HYPERBILIRUBINEMIA: Primary | ICD-10-CM

## 2022-07-16 NOTE — RESULT ENCOUNTER NOTE
Martinez Wise,    I had the opportunity to review your recent labs and a summary of your labs reads as follows:    Your complete blood counts show no sign of anemia, normal white blood cell count and platelets.  Your fasting lipid panel show  - normal HDL (good) cholesterol -as your goal is greater than 40  - low LDL (bad) cholesterol as your goal is less than 130  - normal triglyceride levels  Your PSA level is also stable indicating no evidence of prostate cancer      Your follow up liver function tests showed a concerning finding of elevated bilirubin and I would recommend we recheck this non-fasting      Sincerely,  Jacob Graham MD

## 2022-07-16 NOTE — TELEPHONE ENCOUNTER
Can we call Billie Aden and let him know that       Martinez Wise,    I had the opportunity to review your recent labs and a summary of your labs reads as follows:    Your complete blood counts show no sign of anemia, normal white blood cell count and platelets.  Your fasting lipid panel show  - normal HDL (good) cholesterol -as your goal is greater than 40  - low LDL (bad) cholesterol as your goal is less than 130  - normal triglyceride levels  Your PSA level is also stable indicating no evidence of prostate cancer      Your follow up liver function tests showed a concerning finding of elevated bilirubin and I would recommend we recheck this non-fasting       Sincerely,  Jacob Graham MD

## 2022-07-20 NOTE — TELEPHONE ENCOUNTER
Patient Contact    Attempt # 1    Was call answered?  No.  Left message on voicemail with information to call back.    Karma KEMP, Triage RN  [facility]

## 2022-07-20 NOTE — TELEPHONE ENCOUNTER
Patient called back the clinic, provider's message given. Future lab appt scheduled for Monday.     Appointments in Next Year    Jul 25, 2022  1:30 PM  LAB with CS LAB  [facility] ([facility] ) [phone]   Nov 15, 2022  7:30 AM  (Arrive by 7:10 AM)  Annual Wellness Visit with Jacob Graham MD  [facility] ([facility] ) [phone]

## 2022-07-25 ENCOUNTER — LAB (OUTPATIENT)
Dept: LAB | Facility: CLINIC | Age: 67
End: 2022-07-25
Payer: COMMERCIAL

## 2022-07-25 DIAGNOSIS — E80.6 HYPERBILIRUBINEMIA: ICD-10-CM

## 2022-07-25 DIAGNOSIS — K76.0 NAFLD (NONALCOHOLIC FATTY LIVER DISEASE): ICD-10-CM

## 2022-07-25 PROCEDURE — 36415 COLL VENOUS BLD VENIPUNCTURE: CPT

## 2022-07-25 PROCEDURE — 80076 HEPATIC FUNCTION PANEL: CPT

## 2022-07-26 LAB
ALBUMIN SERPL-MCNC: 4 G/DL (ref 3.4–5)
ALP SERPL-CCNC: 62 U/L (ref 40–150)
ALT SERPL W P-5'-P-CCNC: 42 U/L (ref 0–70)
AST SERPL W P-5'-P-CCNC: 32 U/L (ref 0–45)
BILIRUB DIRECT SERPL-MCNC: 0.3 MG/DL (ref 0–0.2)
BILIRUB SERPL-MCNC: 2.4 MG/DL (ref 0.2–1.3)
PROT SERPL-MCNC: 6.9 G/DL (ref 6.8–8.8)

## 2022-07-28 ENCOUNTER — TELEPHONE (OUTPATIENT)
Dept: FAMILY MEDICINE | Facility: CLINIC | Age: 67
End: 2022-07-28

## 2022-07-28 DIAGNOSIS — E80.6 HYPERBILIRUBINEMIA: Primary | ICD-10-CM

## 2022-07-28 DIAGNOSIS — K76.0 NAFLD (NONALCOHOLIC FATTY LIVER DISEASE): ICD-10-CM

## 2022-07-28 NOTE — TELEPHONE ENCOUNTER
Can we call Billie Aden and let him know that       Martinez Wise,    I have had the opportunity to review your recent results and an interpretation is as follows:  Your follow-up liver function tests show stable ALT, AST and alkaline phosphatase, and slight improvement of your total bilirubin.  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

## 2022-07-28 NOTE — RESULT ENCOUNTER NOTE
Martinez Wise,    I have had the opportunity to review your recent results and an interpretation is as follows:  Your follow-up liver function tests show stable ALT, AST and alkaline phosphatase, and slight improvement of your total bilirubin.  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

## 2022-07-29 ENCOUNTER — HOSPITAL ENCOUNTER (OUTPATIENT)
Dept: ULTRASOUND IMAGING | Facility: CLINIC | Age: 67
Discharge: HOME | End: 2022-07-29
Attending: INTERNAL MEDICINE | Admitting: INTERNAL MEDICINE
Payer: COMMERCIAL

## 2022-07-29 DIAGNOSIS — K76.0 NAFLD (NONALCOHOLIC FATTY LIVER DISEASE): ICD-10-CM

## 2022-07-29 DIAGNOSIS — E80.6 HYPERBILIRUBINEMIA: ICD-10-CM

## 2022-07-29 PROCEDURE — 76700 US EXAM ABDOM COMPLETE: CPT

## 2022-07-29 NOTE — TELEPHONE ENCOUNTER
Pt read message and already did US today.  Gladis Muro, RN  [facility] RN Triage Team

## 2022-07-31 NOTE — RESULT ENCOUNTER NOTE
Martinez Wise,    I have had the opportunity to review your recent results and an interpretation is as follows:  Your follow-up ultrasound shows moderate fatty infiltration of the liver, but no masses or other concerning areas within the liver.  At this point we can continue to monitor your liver function test.  And continue to work on diet and improvement in general metabolic health.  A referral to nutritionist could be offered if you like.    Sincerely,  Jacob Graham MD

## 2022-10-15 ENCOUNTER — HEALTH MAINTENANCE LETTER (OUTPATIENT)
Age: 67
End: 2022-10-15

## 2022-11-15 ENCOUNTER — OFFICE VISIT (OUTPATIENT)
Dept: FAMILY MEDICINE | Facility: CLINIC | Age: 67
End: 2022-11-15
Payer: COMMERCIAL

## 2022-11-15 VITALS
WEIGHT: 221 LBS | HEIGHT: 69 IN | SYSTOLIC BLOOD PRESSURE: 155 MMHG | HEART RATE: 70 BPM | RESPIRATION RATE: 16 BRPM | BODY MASS INDEX: 32.73 KG/M2 | DIASTOLIC BLOOD PRESSURE: 95 MMHG | OXYGEN SATURATION: 95 %

## 2022-11-15 DIAGNOSIS — J30.1 NON-SEASONAL ALLERGIC RHINITIS DUE TO POLLEN: ICD-10-CM

## 2022-11-15 DIAGNOSIS — R73.03 PREDIABETES: ICD-10-CM

## 2022-11-15 DIAGNOSIS — G62.9 PERIPHERAL POLYNEUROPATHY: ICD-10-CM

## 2022-11-15 DIAGNOSIS — K76.0 NAFLD (NONALCOHOLIC FATTY LIVER DISEASE): ICD-10-CM

## 2022-11-15 DIAGNOSIS — N18.30 STAGE 3 CHRONIC KIDNEY DISEASE, UNSPECIFIED WHETHER STAGE 3A OR 3B CKD (H): ICD-10-CM

## 2022-11-15 DIAGNOSIS — R73.01 IFG (IMPAIRED FASTING GLUCOSE): ICD-10-CM

## 2022-11-15 DIAGNOSIS — Z00.00 ROUTINE GENERAL MEDICAL EXAMINATION AT A HEALTH CARE FACILITY: Primary | ICD-10-CM

## 2022-11-15 LAB
ALBUMIN SERPL BCG-MCNC: 4.5 G/DL (ref 3.5–5.2)
ALP SERPL-CCNC: 65 U/L (ref 40–129)
ALT SERPL W P-5'-P-CCNC: 33 U/L (ref 10–50)
ANION GAP SERPL CALCULATED.3IONS-SCNC: 13 MMOL/L (ref 7–15)
AST SERPL W P-5'-P-CCNC: 36 U/L (ref 10–50)
BILIRUB SERPL-MCNC: 1.8 MG/DL
BUN SERPL-MCNC: 17.6 MG/DL (ref 8–23)
CALCIUM SERPL-MCNC: 9.7 MG/DL (ref 8.8–10.2)
CHLORIDE SERPL-SCNC: 107 MMOL/L (ref 98–107)
CREAT SERPL-MCNC: 1.1 MG/DL (ref 0.67–1.17)
DEPRECATED HCO3 PLAS-SCNC: 24 MMOL/L (ref 22–29)
ERYTHROCYTE [DISTWIDTH] IN BLOOD BY AUTOMATED COUNT: 12.2 % (ref 10–15)
GFR SERPL CREATININE-BSD FRML MDRD: 73 ML/MIN/1.73M2
GLUCOSE SERPL-MCNC: 135 MG/DL (ref 70–99)
HBA1C MFR BLD: 6 % (ref 0–5.6)
HCT VFR BLD AUTO: 46.2 % (ref 40–53)
HGB BLD-MCNC: 16 G/DL (ref 13.3–17.7)
MCH RBC QN AUTO: 33.1 PG (ref 26.5–33)
MCHC RBC AUTO-ENTMCNC: 34.6 G/DL (ref 31.5–36.5)
MCV RBC AUTO: 96 FL (ref 78–100)
PLATELET # BLD AUTO: 213 10E3/UL (ref 150–450)
POTASSIUM SERPL-SCNC: 4.4 MMOL/L (ref 3.4–5.3)
PROT SERPL-MCNC: 7.2 G/DL (ref 6.4–8.3)
RBC # BLD AUTO: 4.83 10E6/UL (ref 4.4–5.9)
SODIUM SERPL-SCNC: 144 MMOL/L (ref 136–145)
TSH SERPL DL<=0.005 MIU/L-ACNC: 2.92 UIU/ML (ref 0.3–4.2)
VIT B12 SERPL-MCNC: 302 PG/ML (ref 232–1245)
WBC # BLD AUTO: 5.7 10E3/UL (ref 4–11)

## 2022-11-15 PROCEDURE — G0439 PPPS, SUBSEQ VISIT: HCPCS | Performed by: INTERNAL MEDICINE

## 2022-11-15 PROCEDURE — 84443 ASSAY THYROID STIM HORMONE: CPT | Performed by: INTERNAL MEDICINE

## 2022-11-15 PROCEDURE — 36415 COLL VENOUS BLD VENIPUNCTURE: CPT | Performed by: INTERNAL MEDICINE

## 2022-11-15 PROCEDURE — 80053 COMPREHEN METABOLIC PANEL: CPT | Performed by: INTERNAL MEDICINE

## 2022-11-15 PROCEDURE — 85027 COMPLETE CBC AUTOMATED: CPT | Performed by: INTERNAL MEDICINE

## 2022-11-15 PROCEDURE — 82607 VITAMIN B-12: CPT | Performed by: INTERNAL MEDICINE

## 2022-11-15 PROCEDURE — 83036 HEMOGLOBIN GLYCOSYLATED A1C: CPT | Performed by: INTERNAL MEDICINE

## 2022-11-15 RX ORDER — FLUTICASONE PROPIONATE 50 MCG
1 SPRAY, SUSPENSION (ML) NASAL DAILY
Qty: 16 G | Refills: 11 | Status: SHIPPED | OUTPATIENT
Start: 2022-11-15 | End: 2023-01-09

## 2022-11-15 ASSESSMENT — ENCOUNTER SYMPTOMS
MYALGIAS: 0
CONSTIPATION: 0
HEARTBURN: 0
ABDOMINAL PAIN: 0
HEMATURIA: 0
PALPITATIONS: 0
HEADACHES: 0
CHILLS: 0
COUGH: 0
SHORTNESS OF BREATH: 0
JOINT SWELLING: 0
SORE THROAT: 0
EYE PAIN: 0
FREQUENCY: 0
NERVOUS/ANXIOUS: 0
DIARRHEA: 0
PARESTHESIAS: 0
FEVER: 0
WEAKNESS: 0
NAUSEA: 0
DYSURIA: 0
ARTHRALGIAS: 0
DIZZINESS: 0
HEMATOCHEZIA: 0

## 2022-11-15 ASSESSMENT — PAIN SCALES - GENERAL: PAINLEVEL: NO PAIN (0)

## 2022-11-15 ASSESSMENT — ACTIVITIES OF DAILY LIVING (ADL): CURRENT_FUNCTION: NO ASSISTANCE NEEDED

## 2022-11-15 NOTE — PROGRESS NOTES
"SUBJECTIVE:   Billie is a 67 year old who presents for Preventive Visit.    Patient has been advised of split billing requirements and indicates understanding: Yes  Are you in the first 12 months of your Medicare coverage?  No    Healthy Habits:     In general, how would you rate your overall health?  Good    Frequency of exercise:  4-5 days/week    Duration of exercise:  Greater than 60 minutes    Do you usually eat at least 4 servings of fruit and vegetables a day, include whole grains    & fiber and avoid regularly eating high fat or \"junk\" foods?  Yes    Taking medications regularly:  Yes    Medication side effects:  Muscle aches    Ability to successfully perform activities of daily living:  No assistance needed    Home Safety:  No safety concerns identified    Hearing Impairment:  Difficulty following a conversation in a noisy restaurant or crowded room    In the past 6 months, have you been bothered by leaking of urine?  No    In general, how would you rate your overall mental or emotional health?  Excellent      PHQ-2 Total Score: 0    Additional concerns today:  Yes    Do you feel safe in your environment?     Have you ever done Advance Care Planning? (For example, a Health Directive, POLST, or a discussion with a medical provider or your loved ones about your wishes): Yes, patient states has an Advance Care Planning document and will bring a copy to the clinic.       Fall risk  Fallen 2 or more times in the past year?: No  Any fall with injury in the past year?: No    Cognitive Screening   1) Repeat 3 items (Leader, Season, Table)    2) Clock draw: NORMAL  3) 3 item recall: Recalls 3 objects  Results: 3 items recalled: COGNITIVE IMPAIRMENT LESS LIKELY    Mini-CogTM Copyright DESTIN Juarez. Licensed by the author for use in [facility]; reprinted with permission (kevin@.[facility]). All rights reserved.      Do you have sleep apnea, excessive snoring or daytime drowsiness?: yes    Reviewed and updated as " needed this visit by clinical staff   Tobacco  Allergies  Meds              Reviewed and updated as needed this visit by Provider                 Social History     Tobacco Use     Smoking status: Never     Smokeless tobacco: Never   Substance Use Topics     Alcohol use: Yes     Comment: occasionally     If you drink alcohol do you typically have >3 drinks per day or >7 drinks per week? No    Alcohol Use 11/15/2022   Prescreen: >3 drinks/day or >7 drinks/week? No   Prescreen: >3 drinks/day or >7 drinks/week? -   No flowsheet data found.      Current providers sharing in care for this patient include:   Patient Care Team:  Jacob Graham MD as PCP - General (Internal Medicine)  Jacob Graham MD as Assigned PCP    The following health maintenance items are reviewed in Epic and correct as of today:  Health Maintenance   Topic Date Due     ANNUAL REVIEW OF HM ORDERS  Never done     COVID-19 Vaccine (2 - Booster for Sybil series) 05/01/2021     MEDICARE ANNUAL WELLNESS VISIT  02/03/2022     Pneumococcal Vaccine: 65+ Years (3) 03/22/2022     INFLUENZA VACCINE (1) 09/01/2022     BMP  07/13/2023     LIPID  07/13/2023     MICROALBUMIN  07/13/2023     HEMOGLOBIN  07/13/2023     DTAP/TDAP/TD IMMUNIZATION (2 - Td or Tdap) 09/20/2023     COLORECTAL CANCER SCREENING  10/29/2023     FALL RISK ASSESSMENT  11/15/2023     ADVANCE CARE PLANNING  02/08/2026     HEPATITIS C SCREENING  Completed     PHQ-2 (once per calendar year)  Completed     URINALYSIS  Completed     ZOSTER IMMUNIZATION  Completed     AORTIC ANEURYSM SCREENING (SYSTEM ASSIGNED)  Completed     IPV IMMUNIZATION  Aged Out     MENINGITIS IMMUNIZATION  Aged Out     Lab work is in process  Labs reviewed in EPIC    Review of Systems  Constitutional, HEENT - post-nasal drip at night, cardiovascular - right rib pain, pulmonary, GI, , musculoskeletal, neurology - worsening neuropathy in feet, skin, endocrine and psych systems are negative, except as  "otherwise noted.    OBJECTIVE:   BP (!) 144/88 (BP Location: Right arm, Patient Position: Sitting, Cuff Size: Adult Large)   Pulse 70   Resp 16   Ht 1.753 m (5' 9\")   Wt 100.2 kg (221 lb)   SpO2 95%   BMI 32.64 kg/m   Estimated body mass index is 32.64 kg/m  as calculated from the following:    Height as of this encounter: 1.753 m (5' 9\").    Weight as of this encounter: 100.2 kg (221 lb).  Physical Exam  GENERAL: healthy, alert and no distress  EYES: Eyes grossly normal to inspection,   HENT: ear canals and TM's normal,   NECK: no adenopathy, no asymmetry, masses,   RESP: lungs clear to auscultation - no rales, rhonchi or wheezes  CV: regular rate and rhythm, normal S1 S2, no S3 or S4, no murmur, click or rub, no peripheral edema   ABDOMEN: soft, nontender, no hepatosplenomegaly, no masses and bowel sounds normal  MS: no gross musculoskeletal defects noted, no edema  SKIN: no suspicious lesions or rashes  NEURO: Normal strength and tone, mentation intact and speech normal  PSYCH: mentation appears normal, affect normal/bright    Diagnostic Test Results:  Labs reviewed in Epic    We will check  a CBC, CMP, B12, Hemoglobin A1c, Antitreponemal antibody, TSH, serum immunoelectrophoresis, Lyme titer, hepatitis C antibody, MAURY and Sjogren antibodies.    ASSESSMENT / PLAN:     Patient Instructions   (Z00.00) Routine general medical examination at a health care facility  (primary encounter diagnosis)  Comment: For routine exam, we will draw labs as ordered, diabetes mellitus check, liver function, renal function, and plan for colonoscopy 10/2023.  We will also update vaccination history.  Plan: Comprehensive metabolic panel (BMP + Alb, Alk         Phos, ALT, AST, Total. Bili, TP), CBC with         platelets, Comprehensive metabolic panel (BMP +        Alb, Alk Phos, ALT, AST, Total. Bili, TP)            (K76.0) NAFLD (nonalcoholic fatty liver disease)  Comment: Contnue to minimize alcohol and liver toxic medications " "  Plan:     (N18.30) Stage 3 chronic kidney disease, unspecified whether stage 3a or 3b CKD (H)  Comment: noted chronic kidney disease history - drink plenty of fluids  Plan:     (R73.01) IFG (impaired fasting glucose)  Comment: Check hemoglobin A1c today   Plan: Hemoglobin A1c            (J30.1) Non-seasonal allergic rhinitis due to pollen  Comment: Flonase trial requested today  Plan: fluticasone (FLONASE) 50 MCG/ACT nasal spray            Peripheral Nueropathy  Comment: Recommend follow up for progression.  Consider neurology consult.  Check TSH and vitamin b12 today        Patient has been advised of split billing requirements and indicates understanding: Yes      COUNSELING:  Reviewed preventive health counseling, as reflected in patient instructions    Estimated body mass index is 32.64 kg/m  as calculated from the following:    Height as of this encounter: 1.753 m (5' 9\").    Weight as of this encounter: 100.2 kg (221 lb).        He reports that he has never smoked. He has never used smokeless tobacco.      Appropriate preventive services were discussed with this patient, including applicable screening as appropriate for cardiovascular disease, diabetes, osteopenia/osteoporosis, and glaucoma.  As appropriate for age/gender, discussed screening for colorectal cancer, prostate cancer, breast cancer, and cervical cancer. Checklist reviewing preventive services available has been given to the patient.    Reviewed patients plan of care and provided an AVS. The Basic Care Plan (routine screening as documented in Health Maintenance) for Billie meets the Care Plan requirement. This Care Plan has been established and reviewed with the Patient.    Counseling Resources:  ATP IV Guidelines  Pooled Cohorts Equation Calculator  Breast Cancer Risk Calculator  Breast Cancer: Medication to Reduce Risk  FRAX Risk Assessment  ICSI Preventive Guidelines  Dietary Guidelines for Americans, 2010  USDA's MyPlate  ASA " Prophylaxis  Lung CA Screening    Jacob Graham MD, MD  [facility]    Identified Health Risks:

## 2022-11-15 NOTE — PATIENT INSTRUCTIONS
(Z00.00) Routine general medical examination at a health care facility  (primary encounter diagnosis)  Comment: For routine exam, we will draw labs as ordered, diabetes mellitus check, liver function, renal function, and plan for colonoscopy 10/2023.  We will also update vaccination history.  Plan: Comprehensive metabolic panel (BMP + Alb, Alk         Phos, ALT, AST, Total. Bili, TP), CBC with         platelets, Comprehensive metabolic panel (BMP +        Alb, Alk Phos, ALT, AST, Total. Bili, TP)            (K76.0) NAFLD (nonalcoholic fatty liver disease)  Comment: Contnue to minimize alcohol and liver toxic medications   Plan:     (N18.30) Stage 3 chronic kidney disease, unspecified whether stage 3a or 3b CKD (H)  Comment: noted chronic kidney disease history - drink plenty of fluids  Plan:     (R73.01) IFG (impaired fasting glucose)  Comment: Check hemoglobin A1c today   Plan: Hemoglobin A1c            (J30.1) Non-seasonal allergic rhinitis due to pollen  Comment: Flonase trial requested today  Plan: fluticasone (FLONASE) 50 MCG/ACT nasal spray            Peripheral Nueropathy  Comment: Recommend follow up for progression.  Consider neurology consult.  Check TSH and vitamin b12 today

## 2022-11-16 NOTE — RESULT ENCOUNTER NOTE
Martinez Wise,    I had the opportunity to review your recent labs and a summary of your labs reads as follows:    Your complete blood counts show no sign of anemia, normal white blood cell count and platelets.  Your comprehensive metabolic panel showed normal renal function, normal liver function, and stable elevated fasting blood glucose which is high enough to reach the threshold considered for diabetes, however your A1c is also elevated, but not to the level of diabetes.  For now, we should plan to continue to work on diet and exercise and recheck these labs again next year.  Your thyroid function and vitamin B12 also returned within normal limits    Is great to see you today, and I look forward to seeing you in 2023    Sincerely,  Jacob Graham MD

## 2023-01-07 DIAGNOSIS — J30.1 NON-SEASONAL ALLERGIC RHINITIS DUE TO POLLEN: ICD-10-CM

## 2023-01-09 RX ORDER — FLUTICASONE PROPIONATE 50 MCG
SPRAY, SUSPENSION (ML) NASAL
Qty: 16 ML | Refills: 6 | Status: SHIPPED | OUTPATIENT
Start: 2023-01-09 | End: 2024-01-11

## 2023-01-21 ENCOUNTER — HOSPITAL ENCOUNTER (OUTPATIENT)
Facility: CLINIC | Age: 68
Setting detail: OBSERVATION
Discharge: HOME | End: 2023-01-22
Attending: EMERGENCY MEDICINE | Admitting: INTERNAL MEDICINE
Payer: COMMERCIAL

## 2023-01-21 DIAGNOSIS — N20.1 CALCULUS OF URETER: ICD-10-CM

## 2023-01-21 LAB
ALBUMIN SERPL BCG-MCNC: 4.5 G/DL (ref 3.5–5.2)
ALP SERPL-CCNC: 67 U/L (ref 40–129)
ALT SERPL W P-5'-P-CCNC: 53 U/L (ref 10–50)
ANION GAP SERPL CALCULATED.3IONS-SCNC: 10 MMOL/L (ref 7–15)
AST SERPL W P-5'-P-CCNC: 36 U/L (ref 10–50)
BASOPHILS # BLD AUTO: 0 10E3/UL (ref 0–0.2)
BASOPHILS NFR BLD AUTO: 1 %
BILIRUB SERPL-MCNC: 2.2 MG/DL
BUN SERPL-MCNC: 13.7 MG/DL (ref 8–23)
CALCIUM SERPL-MCNC: 9.3 MG/DL (ref 8.8–10.2)
CHLORIDE SERPL-SCNC: 101 MMOL/L (ref 98–107)
CREAT SERPL-MCNC: 1.41 MG/DL (ref 0.67–1.17)
DEPRECATED HCO3 PLAS-SCNC: 29 MMOL/L (ref 22–29)
EOSINOPHIL # BLD AUTO: 0.2 10E3/UL (ref 0–0.7)
EOSINOPHIL NFR BLD AUTO: 4 %
ERYTHROCYTE [DISTWIDTH] IN BLOOD BY AUTOMATED COUNT: 12.1 % (ref 10–15)
GFR SERPL CREATININE-BSD FRML MDRD: 54 ML/MIN/1.73M2
GLUCOSE SERPL-MCNC: 118 MG/DL (ref 70–99)
HCT VFR BLD AUTO: 45.6 % (ref 40–53)
HGB BLD-MCNC: 15.8 G/DL (ref 13.3–17.7)
HOLD SPECIMEN: NORMAL
IMM GRANULOCYTES # BLD: 0 10E3/UL
IMM GRANULOCYTES NFR BLD: 0 %
LYMPHOCYTES # BLD AUTO: 2.3 10E3/UL (ref 0.8–5.3)
LYMPHOCYTES NFR BLD AUTO: 37 %
MCH RBC QN AUTO: 33.1 PG (ref 26.5–33)
MCHC RBC AUTO-ENTMCNC: 34.6 G/DL (ref 31.5–36.5)
MCV RBC AUTO: 96 FL (ref 78–100)
MONOCYTES # BLD AUTO: 0.5 10E3/UL (ref 0–1.3)
MONOCYTES NFR BLD AUTO: 9 %
NEUTROPHILS # BLD AUTO: 3.1 10E3/UL (ref 1.6–8.3)
NEUTROPHILS NFR BLD AUTO: 49 %
NRBC # BLD AUTO: 0 10E3/UL
NRBC BLD AUTO-RTO: 0 /100
PLATELET # BLD AUTO: 187 10E3/UL (ref 150–450)
POTASSIUM SERPL-SCNC: 4.1 MMOL/L (ref 3.4–5.3)
PROT SERPL-MCNC: 6.9 G/DL (ref 6.4–8.3)
RBC # BLD AUTO: 4.77 10E6/UL (ref 4.4–5.9)
SODIUM SERPL-SCNC: 140 MMOL/L (ref 136–145)
WBC # BLD AUTO: 6.1 10E3/UL (ref 4–11)

## 2023-01-21 PROCEDURE — 96375 TX/PRO/DX INJ NEW DRUG ADDON: CPT

## 2023-01-21 PROCEDURE — 96374 THER/PROPH/DIAG INJ IV PUSH: CPT

## 2023-01-21 PROCEDURE — 85025 COMPLETE CBC W/AUTO DIFF WBC: CPT | Performed by: EMERGENCY MEDICINE

## 2023-01-21 PROCEDURE — 81001 URINALYSIS AUTO W/SCOPE: CPT | Performed by: EMERGENCY MEDICINE

## 2023-01-21 PROCEDURE — 80053 COMPREHEN METABOLIC PANEL: CPT | Performed by: EMERGENCY MEDICINE

## 2023-01-21 PROCEDURE — 87086 URINE CULTURE/COLONY COUNT: CPT | Performed by: EMERGENCY MEDICINE

## 2023-01-21 PROCEDURE — 250N000011 HC RX IP 250 OP 636: Performed by: EMERGENCY MEDICINE

## 2023-01-21 PROCEDURE — 85004 AUTOMATED DIFF WBC COUNT: CPT | Performed by: EMERGENCY MEDICINE

## 2023-01-21 PROCEDURE — 99285 EMERGENCY DEPT VISIT HI MDM: CPT | Mod: 25

## 2023-01-21 PROCEDURE — 36415 COLL VENOUS BLD VENIPUNCTURE: CPT | Performed by: EMERGENCY MEDICINE

## 2023-01-21 RX ORDER — MORPHINE SULFATE 4 MG/ML
4 INJECTION, SOLUTION INTRAMUSCULAR; INTRAVENOUS
Status: COMPLETED | OUTPATIENT
Start: 2023-01-21 | End: 2023-01-21

## 2023-01-21 RX ORDER — KETOROLAC TROMETHAMINE 15 MG/ML
10 INJECTION, SOLUTION INTRAMUSCULAR; INTRAVENOUS ONCE
Status: COMPLETED | OUTPATIENT
Start: 2023-01-21 | End: 2023-01-21

## 2023-01-21 RX ORDER — ONDANSETRON 2 MG/ML
4 INJECTION INTRAMUSCULAR; INTRAVENOUS EVERY 30 MIN PRN
Status: DISCONTINUED | OUTPATIENT
Start: 2023-01-21 | End: 2023-01-22

## 2023-01-21 RX ADMIN — ONDANSETRON 4 MG: 2 INJECTION INTRAMUSCULAR; INTRAVENOUS at 23:47

## 2023-01-21 RX ADMIN — KETOROLAC TROMETHAMINE 10 MG: 15 INJECTION, SOLUTION INTRAMUSCULAR; INTRAVENOUS at 23:47

## 2023-01-21 RX ADMIN — MORPHINE SULFATE 4 MG: 4 INJECTION, SOLUTION INTRAMUSCULAR; INTRAVENOUS at 23:58

## 2023-01-21 ASSESSMENT — ENCOUNTER SYMPTOMS
ABDOMINAL PAIN: 1
NAUSEA: 0
FLANK PAIN: 1
FEVER: 0
CHILLS: 0
HEMATURIA: 1
VOMITING: 0

## 2023-01-21 ASSESSMENT — ACTIVITIES OF DAILY LIVING (ADL): ADLS_ACUITY_SCORE: 33

## 2023-01-22 ENCOUNTER — APPOINTMENT (OUTPATIENT)
Dept: CT IMAGING | Facility: CLINIC | Age: 68
End: 2023-01-22
Attending: EMERGENCY MEDICINE
Payer: COMMERCIAL

## 2023-01-22 VITALS
RESPIRATION RATE: 16 BRPM | HEART RATE: 75 BPM | BODY MASS INDEX: 32.58 KG/M2 | OXYGEN SATURATION: 94 % | DIASTOLIC BLOOD PRESSURE: 80 MMHG | WEIGHT: 220 LBS | TEMPERATURE: 98.4 F | HEIGHT: 69 IN | SYSTOLIC BLOOD PRESSURE: 131 MMHG

## 2023-01-22 PROBLEM — N20.1 CALCULUS OF URETER: Status: ACTIVE | Noted: 2023-01-22

## 2023-01-22 LAB
ALBUMIN UR-MCNC: 30 MG/DL
APPEARANCE UR: ABNORMAL
BILIRUB UR QL STRIP: NEGATIVE
COLOR UR AUTO: ABNORMAL
GLUCOSE UR STRIP-MCNC: NEGATIVE MG/DL
HGB UR QL STRIP: ABNORMAL
KETONES UR STRIP-MCNC: NEGATIVE MG/DL
LEUKOCYTE ESTERASE UR QL STRIP: NEGATIVE
NITRATE UR QL: NEGATIVE
PH UR STRIP: 6.5 [PH] (ref 5–7)
RBC URINE: >182 /HPF
SP GR UR STRIP: 1.02 (ref 1–1.03)
UROBILINOGEN UR STRIP-MCNC: NORMAL MG/DL
WBC URINE: 0 /HPF

## 2023-01-22 PROCEDURE — 99207 PR NO BILLABLE SERVICE THIS VISIT: CPT | Performed by: HOSPITALIST

## 2023-01-22 PROCEDURE — 99235 HOSP IP/OBS SAME DATE MOD 70: CPT | Performed by: INTERNAL MEDICINE

## 2023-01-22 PROCEDURE — G0378 HOSPITAL OBSERVATION PER HR: HCPCS

## 2023-01-22 PROCEDURE — 74176 CT ABD & PELVIS W/O CONTRAST: CPT

## 2023-01-22 PROCEDURE — 258N000003 HC RX IP 258 OP 636: Performed by: INTERNAL MEDICINE

## 2023-01-22 PROCEDURE — 99203 OFFICE O/P NEW LOW 30 MIN: CPT | Performed by: UROLOGY

## 2023-01-22 RX ORDER — PROCHLORPERAZINE MALEATE 5 MG
5 TABLET ORAL EVERY 6 HOURS PRN
Status: DISCONTINUED | OUTPATIENT
Start: 2023-01-22 | End: 2023-01-22 | Stop reason: HOSPADM

## 2023-01-22 RX ORDER — NALOXONE HYDROCHLORIDE 0.4 MG/ML
0.2 INJECTION, SOLUTION INTRAMUSCULAR; INTRAVENOUS; SUBCUTANEOUS
Status: DISCONTINUED | OUTPATIENT
Start: 2023-01-22 | End: 2023-01-22 | Stop reason: HOSPADM

## 2023-01-22 RX ORDER — TAMSULOSIN HYDROCHLORIDE 0.4 MG/1
0.4 CAPSULE ORAL DAILY
Qty: 30 CAPSULE | Refills: 0 | Status: SHIPPED | OUTPATIENT
Start: 2023-01-22 | End: 2023-02-21

## 2023-01-22 RX ORDER — ONDANSETRON 2 MG/ML
4 INJECTION INTRAMUSCULAR; INTRAVENOUS EVERY 6 HOURS PRN
Status: DISCONTINUED | OUTPATIENT
Start: 2023-01-22 | End: 2023-01-22 | Stop reason: HOSPADM

## 2023-01-22 RX ORDER — NALOXONE HYDROCHLORIDE 0.4 MG/ML
0.4 INJECTION, SOLUTION INTRAMUSCULAR; INTRAVENOUS; SUBCUTANEOUS
Status: DISCONTINUED | OUTPATIENT
Start: 2023-01-22 | End: 2023-01-22 | Stop reason: HOSPADM

## 2023-01-22 RX ORDER — HYDROMORPHONE HYDROCHLORIDE 1 MG/ML
0.5 INJECTION, SOLUTION INTRAMUSCULAR; INTRAVENOUS; SUBCUTANEOUS
Status: DISCONTINUED | OUTPATIENT
Start: 2023-01-22 | End: 2023-01-22

## 2023-01-22 RX ORDER — OXYCODONE HYDROCHLORIDE 5 MG/1
5-10 TABLET ORAL EVERY 4 HOURS PRN
Qty: 12 TABLET | Refills: 0 | Status: SHIPPED | OUTPATIENT
Start: 2023-01-22 | End: 2024-01-11

## 2023-01-22 RX ORDER — PROCHLORPERAZINE 25 MG
12.5 SUPPOSITORY, RECTAL RECTAL EVERY 12 HOURS PRN
Status: DISCONTINUED | OUTPATIENT
Start: 2023-01-22 | End: 2023-01-22 | Stop reason: HOSPADM

## 2023-01-22 RX ORDER — HYDRALAZINE HYDROCHLORIDE 20 MG/ML
10 INJECTION INTRAMUSCULAR; INTRAVENOUS EVERY 4 HOURS PRN
Status: DISCONTINUED | OUTPATIENT
Start: 2023-01-22 | End: 2023-01-22 | Stop reason: HOSPADM

## 2023-01-22 RX ORDER — OXYCODONE HYDROCHLORIDE 5 MG/1
5-10 TABLET ORAL EVERY 4 HOURS PRN
Status: DISCONTINUED | OUTPATIENT
Start: 2023-01-22 | End: 2023-01-22 | Stop reason: HOSPADM

## 2023-01-22 RX ORDER — MORPHINE SULFATE 2 MG/ML
2-4 INJECTION, SOLUTION INTRAMUSCULAR; INTRAVENOUS
Status: DISCONTINUED | OUTPATIENT
Start: 2023-01-22 | End: 2023-01-22 | Stop reason: HOSPADM

## 2023-01-22 RX ORDER — ONDANSETRON 4 MG/1
4 TABLET, ORALLY DISINTEGRATING ORAL EVERY 6 HOURS PRN
Status: DISCONTINUED | OUTPATIENT
Start: 2023-01-22 | End: 2023-01-22 | Stop reason: HOSPADM

## 2023-01-22 RX ADMIN — SODIUM CHLORIDE 1000 ML: 9 INJECTION, SOLUTION INTRAVENOUS at 03:42

## 2023-01-22 ASSESSMENT — ACTIVITIES OF DAILY LIVING (ADL)
ADLS_ACUITY_SCORE: 20
ADLS_ACUITY_SCORE: 35
ADLS_ACUITY_SCORE: 20
ADLS_ACUITY_SCORE: 35

## 2023-01-22 NOTE — PLAN OF CARE
Goal Outcome Evaluation:    -diagnostic tests and consults completed and resulted: met  -vital signs normal or at patient baseline: met  -adequate pain control on oral analgesics: met         A/O x4. VSS on RA. Up IND. Denies pain. Discharge meds and instructions given. Sent urine strainer home with pt. Discharged home at 1300.

## 2023-01-22 NOTE — ED TRIAGE NOTES
Rt sided flank pain with blood in urine started today, also rt upper abd pain, no vomiting     Triage Assessment     Row Name 01/21/23 1040       Triage Assessment (Adult)    Airway WDL WDL       Respiratory WDL    Respiratory WDL WDL       Cardiac WDL    Cardiac WDL WDL       Cognitive/Neuro/Behavioral WDL    Cognitive/Neuro/Behavioral WDL WDL

## 2023-01-22 NOTE — PLAN OF CARE
A&Ox4, up ad dontrell, NPO except ice chips and meds for possible procedure. Right flank pain and hematuria.

## 2023-01-22 NOTE — ED PROVIDER NOTES
"    History   Chief Complaint:  Hematuria, Flank Pain, and Abdominal Pain     The history is provided by the patient.      Billie Aden is a 67 year old male who presents with hematuria and abdominal pain. Patient reports onset of right upper abdominal pain and bright red bloody urine today. He states he has right sided flank pain as well. He has a history of kidney stones, but does not follow with a urologist and does not have any stents. No fever, chills, nausea or vomiting.      ROS:  Review of Systems   Constitutional: Negative for chills and fever.   Gastrointestinal: Positive for abdominal pain. Negative for nausea and vomiting.   Genitourinary: Positive for flank pain and hematuria.   All other systems reviewed and are negative.    Allergies:  Dilaudid [Hydromorphone]  Morphine     Medications:    Amlodipine  Aspirin 325 mg  Atorvastatin  Flonase    Past Medical History:    Arthritis  Basal cell carcinoma  CVA  Cholelithiasis  Heart disease  Hypertension  Inferior mesenteric vein thrombosis  Nephrolithiasis   Pelvic fracture    Past Surgical History:    Cholecystectomy  Repair Mohs     Family History:    family history includes Cancer - colorectal (age of onset: 79) in his mother; Cardiovascular (age of onset: 70) in his father; Diabetes Type 2  in his brother; Heart Surgery (age of onset: 66) in his brother; Melanoma in his brother.    Social History:  Presents with female associate  PCP: Jacob Graham     Physical Exam     Patient Vitals for the past 24 hrs:   BP Temp Pulse Resp SpO2 Height Weight   01/21/23 2212 (!) 171/95 -- -- -- -- -- --   01/21/23 2211 -- 97.5  F (36.4  C) 67 20 98 % 1.753 m (5' 9\") 99.8 kg (220 lb)        Physical Exam  General: Resting on the gurney, appears uncomfortable  Head:  The scalp, face, and head appear normal  Mouth/Throat: Mucus membranes are moist  CV:  Regular rate    Normal S1 and S2  No pathological murmur   Resp:  Breath sounds clear and equal " bilaterally    Non-labored, no retractions or accessory muscle use    No coarseness    No wheezing   GI:  Abdomen is soft, no rigidity    No tenderness to palpation  MS:  Normal motor assessment of all extremities.    Good capillary refill noted.  :  No CVA tenderness to percussion  Skin:  No rash or lesions noted.  Neuro: Speech is normal and fluent. No apparent deficit.  Psych:  Awake. Alert.  Normal affect.      Appropriate interactions.]    Emergency Department Course     Imaging:  CT Abdomen Pelvis w/o Contrast   Final Result   IMPRESSION:    1.  Obstructing 4 mm stone mid right ureter.            Report per radiology    Laboratory:  Labs Ordered and Resulted from Time of ED Arrival to Time of ED Departure   COMPREHENSIVE METABOLIC PANEL - Abnormal       Result Value    Sodium 140      Potassium 4.1      Chloride 101      Carbon Dioxide (CO2) 29      Anion Gap 10      Urea Nitrogen 13.7      Creatinine 1.41 (*)     Calcium 9.3      Glucose 118 (*)     Alkaline Phosphatase 67      AST 36      ALT 53 (*)     Protein Total 6.9      Albumin 4.5      Bilirubin Total 2.2 (*)     GFR Estimate 54 (*)    ROUTINE UA WITH MICROSCOPIC REFLEX TO CULTURE - Abnormal    Color Urine Orange (*)     Appearance Urine Slightly Cloudy (*)     Glucose Urine Negative      Bilirubin Urine Negative      Ketones Urine Negative      Specific Gravity Urine 1.024      Blood Urine Large (*)     pH Urine 6.5      Protein Albumin Urine 30 (*)     Urobilinogen Urine Normal      Nitrite Urine Negative      Leukocyte Esterase Urine Negative      RBC Urine >182 (*)     WBC Urine 0     CBC WITH PLATELETS AND DIFFERENTIAL - Abnormal    WBC Count 6.1      RBC Count 4.77      Hemoglobin 15.8      Hematocrit 45.6      MCV 96      MCH 33.1 (*)     MCHC 34.6      RDW 12.1      Platelet Count 187      % Neutrophils 49      % Lymphocytes 37      % Monocytes 9      % Eosinophils 4      % Basophils 1      % Immature Granulocytes 0      NRBCs per 100 WBC 0       Absolute Neutrophils 3.1      Absolute Lymphocytes 2.3      Absolute Monocytes 0.5      Absolute Eosinophils 0.2      Absolute Basophils 0.0      Absolute Immature Granulocytes 0.0      Absolute NRBCs 0.0     URINE CULTURE      Procedures     Emergency Department Course & Assessments:     Interventions:  Medications - No data to display     Independent Interpretation (X-rays, CTs, rhythm strip):  I independently reviewed the CT scan.  By my measurements the patient's stone is 5 mm.    Consultations/Discussion of Management or Tests:  ED Course as of 01/21/23 2314   Sat Jan 21, 2023   2311 I obtained history and examined the patient     Disposition:  The patient was admitted to the hospital under the care of Dr. Reed.     Impression & Plan      Medical Decision Making:  Billie Aden is a 67 year old male who presents complaining of flank pain.  Evaluation today was consistent with nephrolithiasis and renal colic.  This explains the patient's pain.  Based on history and exam and the above studies, I do not feel that there is evidence of other acute intraabdominal process at this time.  CT scan showed a mid ureteral stone.  UA was obtained and no overt signs of infection are present.  Given the presence of the kidney stone, urine culture was sent.  The patient received pain control, antiemetic and IV fluids as documented above.  Pain and nausea was well controlled with these measures.  Given that the patient's stone is still in the mid ureter and the severity of his pain coupled with the size of the stone I am concerned about his ability to independently pass the stone without intervention.  He has required lithotripsy in the past for smaller stones.  He is admitted to the hospitalist for further stone management.        Diagnosis:    ICD-10-CM    1. Calculus of ureter  N20.1              Scribe Disclosure:  Marvel PUGH, am serving as a scribe at 11:11 PM on 1/21/2023 to document services personally  performed by Aarti Guerrero MD based on my observations and the provider's statements to me.     1/21/2023   Aarti Guerrero MD Debroux, Karah M, MD  01/22/23 0634

## 2023-01-22 NOTE — CONSULTS
Consult Date: 01/22/2023    REASON FOR CONSULTATION:  Right ureteral stone.    HISTORY OF PRESENT ILLNESS:  Aleyda Aden is a 67-year-old gentleman who has had a prior history of stones.  He has always been able to pass his stones and has never required surgery.  He came to the Emergency Department yesterday, reporting less than 24 hours of right-sided flank pain.  He was diagnosed with a 4 mm mid right ureteral stone.  He has no signs of infection.  No fevers, chills, nausea or vomiting.  He was admitted to the hospital for pain control.  Since being admitted to the hospital, his pain has resolved.  He has required no further pain control.  He has no difficulty urinating.  He has been straining his urine and has not yet seen a stone.    PAST MEDICAL HISTORY:  Prior history of kidney stones as above.  History of hypertension.    PAST SURGICAL HISTORY:  No prior urologic surgery.  He has had a prior cholecystectomy.    HOME MEDICATIONS:    1.  Amlodipine.  2.  Atorvastatin.  3.  Flonase.  4.  Aspirin.    ALLERGIES:  DILAUDID, MORPHINE.    SOCIAL HISTORY:  He is a nonsmoker.    FAMILY HISTORY:  No family history of urologic history or stones.    REVIEW OF SYSTEMS:  Currently negative.  No fevers, chills, nausea, vomiting.  Currently, no flank pain.  No dysuria.  No constipation or diarrhea.  No chest pain.  No shortness of breath.  No musculoskeletal symptoms.  No psychiatric changes.  No visual changes.    PHYSICAL EXAMINATION:    VITAL SIGNS:  He is currently afebrile.  Vital signs stable.  GENERAL:  Alert and oriented, in no acute distress.  HEENT:  Normocephalic, atraumatic.  LUNGS:  Normal nonlabored breathing.  ABDOMEN:  Soft, nontender, nondistended.    IMAGING STUDIES:  I reviewed CT scan images and there is a 4 mm mid right ureteral stone.  No other stones present.    LABORATORY DATA:  Urinalysis shows hematuria only.  Serum white blood count is normal at 6.1.  Creatinine is 1.41.    IMPRESSION AND PLAN:  This  is a 67-year-old gentleman with a 4 mm mid right ureteral stone who was admitted for pain control, but has had no pain since being admitted.  The stone it is within size-range where we would expect it to pass spontaneously.  However, it is difficult to predict when these stones will pass.  We discussed trial of passage versus treating the stone.  He would like to go home with trial of passage.  I think this is very reasonable.  He will be discharged home with 30 days of Flomax 0.4 mg per day.  He should have p.r.n. oxycodone.  He should strain his urine every time he urinates.  My office will contact him for followup.  I will have him follow up in our office in 2-4 weeks with a noncontrast CT scan to see if he has passed his stone.    Parvez Salamanca MD        D: 2023   T: 2023   MT: VELIA    Name:     RICHARD FERNANDES  MRN:      [id number]        Account:      [id number]   :      1955           Consult Date: 2023     Document: [id number]

## 2023-01-22 NOTE — H&P
"[facility]  History and Physical - Hospitalist Service  Date of Admission:  1/21/2023    Assessment & Plan    Billie Aden is a 67 year old male with hypertension, dyslipidemia, CKD stage 3 admitted on 1/21/2023 for right obstructive uropathy due to ureteral stone and ureteral.     Right ureteral stone  Obstructive uropathy  CKD stage 3  Obstructing stone 4-5 mm on CT with hydroureteronephrosis.  No fevers or chills.  White blood cell count normal.  -admit to observation  -urology consultation ([place])  -IV fluids (/hour x 10 hours) then reassess  -pain control: prn acetaminophen, oxycodone (per patient request), IV morphine  -NPO for anticipated procedure     Hypertension  Dyslipidemia  -Hold PTA medications for now  -As needed IV hydralazine    Nonalcoholic fatty liver disease  Stroke history       Diet:  NPO  DVT Prophylaxis: Pneumatic Compression Devices and Ambulate every shift  Benjamin Catheter: Not present  Lines: None     Cardiac Monitoring: None  Code Status:   full    Clinically Significant Risk Factors Present on Admission                       # Obesity: Estimated body mass index is 32.49 kg/m  as calculated from the following:    Height as of this encounter: 1.753 m (5' 9\").    Weight as of this encounter: 99.8 kg (220 lb).           Disposition Plan      Expected Discharge Date: 01/24/2023                  Mervat Reed MD  Hospitalist Service  [facility]  Securely message with Navmii (more info)  Text page via [employer] Paging/Directory     ______________________________________________________________________    Chief Complaint   Right upper abdominal and flank pain, blood in urine.    History is obtained from the patient, electronic health record, emergency department physician and patient's significant other    History of Present Illness   Billie Aden is a 67 year old male with hypertension, dyslipidemia, CKD stage 3 who presented with " blood in the urine and right flank pain.  He had acute onset of upper right abdominal pain and some bloody urine today.  He has had kidney stones in the past so thought this is probably what was happening.  Took some oxycodone that he had from a prior kidney stone but this did not seem to help very much and the hematuria continued.  He does not see a urologist but gets primary care in the [place] system.  Has not had any urologic procedures in the past that he can recall.  Denies fever, chills, night sweats, nausea, vomiting.  He had some morphine IV in the emergency department here which gave him a headache.  He does not tolerate Dilaudid.  Denies chest pain, shortness of breath, limb pains, change in bowel habits.    Case discussed with Dr. Guerrero from the Emergency Department. Abs and available imaging reviewed.  Add IV morphine and Toradol in emergency department.    Past Medical History    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     Nephrolithiasis      Pelvic fracture (H) 11/2014    occured while horseback riding     Past Surgical History   Past Surgical History:   Procedure Laterality Date     COLONOSCOPY       COSMETIC SURGERY  11/20/2017     HC CLOSED TX SHOULDER DISLOC W MANIP NO ANESTH       LAPAROSCOPIC CHOLECYSTECTOMY N/A 12/23/2014    Surgeon: Avelino Bryson MD;  Location:  [facility]     REPAIR MOHS Left 1/25/2018    Procedure: REPAIR MOHS;  Left Lower Eyelid Mohs Reconstruction;  Surgeon: Claudia Melvin MD;  Location:  OR     Prior to Admission Medications   Prior to Admission Medications   Prescriptions Last Dose Informant Patient Reported? Taking?   amLODIPine (NORVASC) 5 MG tablet   No No   Sig: Take 1 tablet (5 mg) by  mouth daily   aspirin (ASA) 325 MG EC tablet   Yes No   Sig: Take 325 mg by mouth daily   atorvastatin (LIPITOR) 40 MG tablet   No No   Sig: Take 1 tablet (40 mg) by mouth daily   fluticasone (FLONASE) 50 MCG/ACT nasal spray   No No   Sig: INSTILL 1 SPRAY INTO BOTH NOSTRILS DAILY      Facility-Administered Medications: None        Review of Systems    The 10 point Review of Systems is negative other than noted in the HPI or here.      Physical Exam   Vital Signs: Temp: 97.5  F (36.4  C)   BP: (!) 171/95 Pulse: 67   Resp: 20 SpO2: 98 %      Weight: 220 lbs 0 oz    Constitutional: awake, alert, cooperative, no apparent distress, and appears stated age  Eyes: sclera clear and conjunctiva normal  ENT: normocepalic, without obvious abnormality, atramatic  Respiratory: No increased work of breathing, good air exchange, clear to auscultation bilaterally, no crackles or wheezing  Cardiovascular: regular rate and rhythm, normal S1 and S2 and no murmur noted  GI: No scars, normal bowel sounds, soft, non-distended  : + right CVAT  Skin: no rashes and no lesions  Musculoskeletal: there is no redness, warmth, or swelling of the joints  tone is normal  Neurologic: no focal deficits, face symmetric, speech normal, no tremors     Medical Decision Making       MANAGEMENT DISCUSSED with the following over the past 24 hours: ED doctor, nurse   NOTE(S)/MEDICAL RECORDS REVIEWED over the past 24 hours: outside records, med lists  Tests ORDERED & REVIEWED in the past 24 hours:  - See lab/imaging results included in the data section of the note  Tests personally interpreted in the past 24 hours:  - abd CT showing right ureteral stone      Data     I have personally reviewed the following data over the past 24 hrs:    6.1  \   15.8   / 187     140 101 13.7 /  118 (H)   4.1 29 1.41 (H) \       ALT: 53 (H) AST: 36 AP: 67 TBILI: 2.2 (H)   ALB: 4.5 TOT PROTEIN: 6.9 LIPASE: N/A       Imaging results reviewed over the past 24 hrs:   Recent  Results (from the past 24 hour(s))   CT Abdomen Pelvis w/o Contrast    Narrative    EXAM: CT ABDOMEN PELVIS W/O CONTRAST  LOCATION: [facility]  DATE/TIME: 1/22/2023 12:47 AM    INDICATION: right flank pain  COMPARISON: 08/07/2021  TECHNIQUE: CT scan of the abdomen and pelvis was performed without IV contrast. Multiplanar reformats were obtained. Dose reduction techniques were used.  CONTRAST: None.    FINDINGS:   LOWER CHEST: Normal.    HEPATOBILIARY: Cholecystectomy.    PANCREAS: Normal.    SPLEEN: Normal.    ADRENAL GLANDS: Normal.    KIDNEYS/BLADDER: 4 mm stone mid right ureter with mild hydroureteronephrosis. Punctate stone lower calyx right kidney. Left kidney unremarkable.    BOWEL: Diverticulosis of the colon. No acute inflammatory change. No obstruction. Appendix normal.    LYMPH NODES: Normal.    VASCULATURE: Mild atherosclerosis.    PELVIC ORGANS: Normal.    MUSCULOSKELETAL: Degenerative changes. Fat-containing inguinal hernias.      Impression    IMPRESSION:   1.  Obstructing 4 mm stone mid right ureter.

## 2023-01-22 NOTE — PROGRESS NOTES
Urology consult dictated    Patient can discharge home at this time with 30 days of Flomax 0.4 mg/day and urine strainer as well as oxycodone as needed.  My office will contact him tomorrow to schedule follow-up.

## 2023-01-22 NOTE — PROGRESS NOTES
RECEIVING UNIT ED HANDOFF REVIEW    ED Nurse Handoff Report was reviewed by: Noemi Pang RN on January 22, 2023 at 3:03 AM

## 2023-01-22 NOTE — DISCHARGE SUMMARY
[facility]  Hospitalist Discharge Summary      Date of Admission:  1/21/2023  Date of Discharge:  1/22/2023  1:15 PM  Discharging Provider: Giovanni Gallegos MD  Discharge Service: Hospitalist Service    Discharge Diagnoses   R obstructing ureteral stone with hydroureteronephrosis  CKD III  Hypertension  Dyslipidemia  NFLD  Hx of stroke, per chart      Follow-ups Needed After Discharge   Follow-up Appointments     Follow-up and recommended labs and tests       PCP as needed.  Dr Salamanca - Urology clinic.             Unresulted Labs Ordered in the Past 30 Days of this Admission     Date and Time Order Name Status Description    1/21/2023 11:18 PM Urine Culture In process       These results will be followed up by IM/Urology    Discharge Disposition   Discharged to home  Condition at discharge: Stable      Hospital Course   Billie Aden is a 67 year old male with hypertension, dyslipidemia, CKD stage 3 admitted on 1/21/2023 for right obstructive uropathy due to ureteral stone and ureteral.      Right ureteral stone  Obstructive uropathy  CKD stage 3  Obstructing stone 4-5 mm on CT with hydroureteronephrosis.  No fevers or chills.  White blood cell count normal.  -admit to observation  -urology consultation ([place])  -IV fluids (/hour x 10 hours) then reassess  -pain control: prn acetaminophen, oxycodone (per patient request), IV morphine  -NPO for anticipated procedure   --- 1/22 midday - pain resolved. Urology consulted and appreciated. Rec discharge with prn analgesia and flomax, strain urine at home, and follow up in urology clinic. UA not appearing infected, UCx pending.     Hypertension  Dyslipidemia  -Hold PTA medications for now  -As needed IV hydralazine  - resume home regimen upon discharge     Nonalcoholic fatty liver disease  Stroke history       Consultations This Hospital Stay   UROLOGY IP CONSULT    Code Status   Prior    Time Spent on this Encounter   Giovanni PUGH  MD El, personally saw the patient today and spent greater than 30 minutes discharging this patient.       Giovanni Gallegos MD  [facility]  [address] [facility] [address]  Phone: [phone]  Fax: [phone]  ______________________________________________________________________    Physical Exam   Vital Signs: Temp: 98.4  F (36.9  C) Temp src: Oral BP: 131/80 Pulse: 75   Resp: 16 SpO2: 94 % O2 Device: None (Room air)    Weight: 220 lbs 0 oz    Gen: NAD, pleasant  HEENT: EOMI, MMM  Resp: no crackles,  no wheezes, no increased work of resp  CV: S1S2 heard, reg rhythm, reg rate  Abdo: soft, nontender, nondistended, bowel sounds present  Ext: calves nontender, well perfused  Neuro: aaox3, moving ext, conversing normally, CN grossly intact, no facial asymmetry         Primary Care Physician   Jacob Graham MD    Discharge Orders      Reason for your hospital stay    Abdominal/flank pain     Follow-up and recommended labs and tests     PCP as needed.  Dr Salamanca - Urology clinic.     Activity    Your activity upon discharge: activity as tolerated     Discharge Instructions    As needed pain med available.  Do not drive/operate machinery or drink alcohol if/when taking oxycodone.  As directed by urology, strain your urine and follow up with Urology Clinic.     Diet    Follow this diet upon discharge: Orders Placed This Encounter      Regular Diet Adult       Significant Results and Procedures   Most Recent 3 CBC's:Recent Labs   Lab Test 01/21/23  2219 11/15/22  0838 07/13/22  1014   WBC 6.1 5.7 5.8   HGB 15.8 16.0 15.6   MCV 96 96 95    213 198     Most Recent 3 BMP's:Recent Labs   Lab Test 01/21/23  2219 11/15/22  0838 07/13/22  1014    144 140   POTASSIUM 4.1 4.4 4.0   CHLORIDE 101 107 108   CO2 29 24 27   BUN 13.7 17.6 18   CR 1.41* 1.10 1.04   ANIONGAP 10 13 5   ARMANI 9.3 9.7 9.0   * 135* 122*     7-Day Micro Results     Collected  Updated Procedure Result Status      01/21/2023 2220 01/22/2023 0031 Urine Culture [[id number]]   Urine, Clean Catch    In process Component Value   No component results                   Most Recent Urinalysis:Recent Labs   Lab Test 01/21/23 2220 03/27/20  2112 08/22/18  1300   COLOR Orange*   < > Yellow   APPEARANCE Slightly Cloudy*   < > Clear   URINEGLC Negative   < > Negative   URINEBILI Negative   < > Negative   URINEKETONE Negative   < > Negative   SG 1.024   < > 1.020   UBLD Large*   < > Negative   URINEPH 6.5   < > 7.0   PROTEIN 30*   < > Negative   UROBILINOGEN  --   --  1.0   NITRITE Negative   < > Negative   LEUKEST Negative   < > Negative   RBCU >182*   < >  --    WBCU 0   < >  --     < > = values in this interval not displayed.   ,   Results for orders placed or performed during the hospital encounter of 01/21/23   CT Abdomen Pelvis w/o Contrast    Narrative    EXAM: CT ABDOMEN PELVIS W/O CONTRAST  LOCATION: [facility]  DATE/TIME: 1/22/2023 12:47 AM    INDICATION: right flank pain  COMPARISON: 08/07/2021  TECHNIQUE: CT scan of the abdomen and pelvis was performed without IV contrast. Multiplanar reformats were obtained. Dose reduction techniques were used.  CONTRAST: None.    FINDINGS:   LOWER CHEST: Normal.    HEPATOBILIARY: Cholecystectomy.    PANCREAS: Normal.    SPLEEN: Normal.    ADRENAL GLANDS: Normal.    KIDNEYS/BLADDER: 4 mm stone mid right ureter with mild hydroureteronephrosis. Punctate stone lower calyx right kidney. Left kidney unremarkable.    BOWEL: Diverticulosis of the colon. No acute inflammatory change. No obstruction. Appendix normal.    LYMPH NODES: Normal.    VASCULATURE: Mild atherosclerosis.    PELVIC ORGANS: Normal.    MUSCULOSKELETAL: Degenerative changes. Fat-containing inguinal hernias.      Impression    IMPRESSION:   1.  Obstructing 4 mm stone mid right ureter.           Discharge Medications   Discharge Medication List as of 1/22/2023 12:22 PM       START taking these medications    Details   oxyCODONE (ROXICODONE) 5 MG tablet Take 1-2 tablets (5-10 mg) by mouth every 4 hours as needed for moderate pain (4-6) or severe pain (7-10) (IF pain not managed with non-pharmacological and non-opioid interventions), Disp-12 tablet, R-0, E-Prescribe      tamsulosin (FLOMAX) 0.4 MG capsule Take 1 capsule (0.4 mg) by mouth daily for 30 days, Disp-30 capsule, R-0, E-Prescribe         CONTINUE these medications which have NOT CHANGED    Details   amLODIPine (NORVASC) 5 MG tablet Take 1 tablet (5 mg) by mouth daily, Disp-90 tablet, R-3, E-Prescribe      aspirin (ASA) 325 MG EC tablet Take 325 mg by mouth daily, Historical      atorvastatin (LIPITOR) 40 MG tablet Take 1 tablet (40 mg) by mouth daily, Disp-90 tablet, R-3, E-Prescribe      fluticasone (FLONASE) 50 MCG/ACT nasal spray INSTILL 1 SPRAY INTO BOTH NOSTRILS DAILY, Disp-16 mL, R-6, E-Prescribe           Allergies   Allergies   Allergen Reactions     Dilaudid [Hydromorphone]      Agitation, did not help with pain     Morphine Anxiety

## 2023-01-22 NOTE — ED NOTES
"[facility]  ED Nurse Handoff Report    ED Chief complaint: Hematuria, Flank Pain, and Abdominal Pain      ED Diagnosis:   Final diagnoses:   None       Code Status: Admitting MD to establish with patient.    Allergies:   Allergies   Allergen Reactions    Dilaudid [Hydromorphone]      Agitation, did not help with pain    Morphine Anxiety       Patient Story: Rt sided flank pain with blood in urine started today, also rt upper abd pain, no vomiting    Focused Assessment:  Pain 10/10 R side    Treatments and/or interventions provided: IV placed, medications, labs, imaging    Patient's response to treatments and/or interventions: See MAR; see results    To be done/followed up on inpatient unit:  See orders    Does this patient have any cognitive concerns?:  No    Activity level - Baseline/Home:  Independent  Activity Level - Current:   Independent    Patient's Preferred language: English   Needed?: No    Isolation: None  Infection: Not Applicable  Patient tested for COVID 19 prior to admission: NO  Bariatric?: No    Vital Signs:   Vitals:    01/21/23 2211 01/21/23 2212   BP:  (!) 171/95   Pulse: 67    Resp: 20    Temp: 97.5  F (36.4  C)    SpO2: 98%    Weight: 99.8 kg (220 lb)    Height: 1.753 m (5' 9\")        Cardiac Rhythm:     Was the PSS-3 completed:   Yes  What interventions are required if any?               Family Comments:   OBS brochure/video discussed/provided to patient/family: N/A              Name of person given brochure if not patient:               Relationship to patient:     For the majority of the shift this patient's behavior was Green.   No behavioral interventions performed.    ED NURSE PHONE NUMBER: *[phone]         "

## 2023-01-22 NOTE — PHARMACY-ADMISSION MEDICATION HISTORY
Pharmacy Medication History  Admission medication history interview status for the 1/21/2023  admission is complete. See EPIC admission navigator for prior to admission medications     Location of Interview: Patient room  Medication history sources: Patient and Surescripts    Significant changes made to the medication list:  No changes     In the past week, patient estimated taking medication this percent of the time: greater than 90%    Medication Affordability:  No issues       Additional medication history information:   Surescripts was reviewed prior to speaking to the patient. No changes were made at this time     Medication reconciliation completed by provider prior to medication history? No    Time spent in this activity: 10 minutes     Prior to Admission medications    Medication Sig Last Dose Taking? Auth Provider Long Term End Date   amLODIPine (NORVASC) 5 MG tablet Take 1 tablet (5 mg) by mouth daily 1/21/2023 Yes Jacob Graham MD Yes    aspirin (ASA) 325 MG EC tablet Take 325 mg by mouth daily 1/21/2023 Yes Reported, Patient No    atorvastatin (LIPITOR) 40 MG tablet Take 1 tablet (40 mg) by mouth daily 1/21/2023 Yes Jacob Graham MD Yes    fluticasone (FLONASE) 50 MCG/ACT nasal spray INSTILL 1 SPRAY INTO BOTH NOSTRILS DAILY  Patient not taking: Reported on 1/22/2023 Not Taking  Jacob Graham MD         The information provided in this note is only as accurate as the sources available at the time of update(s) '

## 2023-01-23 ENCOUNTER — PATIENT OUTREACH (OUTPATIENT)
Dept: CARE COORDINATION | Facility: CLINIC | Age: 68
End: 2023-01-23
Payer: COMMERCIAL

## 2023-01-23 LAB — BACTERIA UR CULT: NO GROWTH

## 2023-01-23 NOTE — PROGRESS NOTES
"Clinic Care Coordination Contact  [facility]: Post-Discharge Note  SITUATION                                                      Admission:    Admission Date: 01/21/23   Reason for Admission: Hematuria, Flank Pain, and Abdominal Pain  Discharge:   Discharge Date: 01/22/23  Discharge Diagnosis: R obstructing ureteral stone with hydroureteronephrosis    BACKGROUND                                                      Per hospital discharge summary and inpatient provider notes:Billie Aden is a 67 year old male with hypertension, dyslipidemia, CKD stage 3 admitted on 1/21/2023 for right obstructive uropathy due to ureteral stone and ureteral.      Right ureteral stone  Obstructive uropathy  CKD stage 3  Obstructing stone 4-5 mm on CT with hydroureteronephrosis.  No fevers or chills.  White blood cell count normal.  -admit to observation  -urology consultation ([place])  -IV fluids (/hour x 10 hours) then reassess  -pain control: prn acetaminophen, oxycodone (per patient request), IV morphine  -NPO for anticipated procedure   --- 1/22 midday - pain resolved. Urology consulted and appreciated. Rec discharge with prn analgesia and flomax, strain urine at home, and follow up in urology clinic. UA not appearing infected, UCx pending.     Hypertension  Dyslipidemia  -Hold PTA medications for now  -As needed IV hydralazine  - resume home regimen upon discharge      ASSESSMENT           Discharge Assessment  How are you doing now that you are home?: \" I am doing fine \"  How are your symptoms? (Red Flag symptoms escalate to triage hotline per guidelines): Improved  Do you feel your condition is stable enough to be safe at home until your provider visit?: Yes  Does the patient have their discharge instructions? : Yes  Does the patient have questions regarding their discharge instructions? : No  Were you started on any new medications or were there changes to any of your previous medications? : Yes  Does the " patient have all of their medications?: Yes  Do you have questions regarding any of your medications? : No  Do you have all of your needed medical supplies or equipment (DME)?  (i.e. oxygen tank, CPAP, cane, etc.): Yes  Discharge follow-up appointment scheduled within 14 calendar days? : No    Post-op ([facility] Only)  If the patient had a surgery or procedure, do they have any questions for a nurse?: No             PLAN                                                      Outpatient Plan:Follow-up and recommended labs and tests  PCP as needed. Dr Salamanca - Urology clinic.    No future appointments.      For any urgent concerns, please contact our 24 hour nurse triage line: [phone] ([phone])         Kale Mcintyre

## 2023-01-24 DIAGNOSIS — N20.0 CALCULUS OF KIDNEY: Primary | ICD-10-CM

## 2023-01-30 ENCOUNTER — APPOINTMENT (OUTPATIENT)
Dept: URBAN - METROPOLITAN AREA CLINIC 256 | Age: 68
Setting detail: DERMATOLOGY
End: 2023-01-30

## 2023-01-30 VITALS — WEIGHT: 218 LBS | HEIGHT: 69 IN

## 2023-01-30 DIAGNOSIS — L82.1 OTHER SEBORRHEIC KERATOSIS: ICD-10-CM

## 2023-01-30 DIAGNOSIS — L57.8 OTHER SKIN CHANGES DUE TO CHRONIC EXPOSURE TO NONIONIZING RADIATION: ICD-10-CM

## 2023-01-30 DIAGNOSIS — Z71.89 OTHER SPECIFIED COUNSELING: ICD-10-CM

## 2023-01-30 DIAGNOSIS — D18.0 HEMANGIOMA: ICD-10-CM

## 2023-01-30 DIAGNOSIS — L82.0 INFLAMED SEBORRHEIC KERATOSIS: ICD-10-CM

## 2023-01-30 DIAGNOSIS — D22 MELANOCYTIC NEVI: ICD-10-CM

## 2023-01-30 DIAGNOSIS — L91.8 OTHER HYPERTROPHIC DISORDERS OF THE SKIN: ICD-10-CM

## 2023-01-30 DIAGNOSIS — L57.0 ACTINIC KERATOSIS: ICD-10-CM

## 2023-01-30 DIAGNOSIS — D485 NEOPLASM OF UNCERTAIN BEHAVIOR OF SKIN: ICD-10-CM

## 2023-01-30 DIAGNOSIS — L81.4 OTHER MELANIN HYPERPIGMENTATION: ICD-10-CM

## 2023-01-30 PROBLEM — D23.61 OTHER BENIGN NEOPLASM OF SKIN OF RIGHT UPPER LIMB, INCLUDING SHOULDER: Status: ACTIVE | Noted: 2023-01-30

## 2023-01-30 PROBLEM — D18.01 HEMANGIOMA OF SKIN AND SUBCUTANEOUS TISSUE: Status: ACTIVE | Noted: 2023-01-30

## 2023-01-30 PROBLEM — D48.5 NEOPLASM OF UNCERTAIN BEHAVIOR OF SKIN: Status: ACTIVE | Noted: 2023-01-30

## 2023-01-30 PROBLEM — D22.5 MELANOCYTIC NEVI OF TRUNK: Status: ACTIVE | Noted: 2023-01-30

## 2023-01-30 PROCEDURE — 17110 DESTRUCT B9 LESION 1-14: CPT

## 2023-01-30 PROCEDURE — OTHER MIPS QUALITY: OTHER

## 2023-01-30 PROCEDURE — 99213 OFFICE O/P EST LOW 20 MIN: CPT | Mod: 25

## 2023-01-30 PROCEDURE — OTHER COUNSELING: OTHER

## 2023-01-30 PROCEDURE — 17000 DESTRUCT PREMALG LESION: CPT | Mod: 59

## 2023-01-30 PROCEDURE — OTHER LIQUID NITROGEN: OTHER

## 2023-01-30 PROCEDURE — 17003 DESTRUCT PREMALG LES 2-14: CPT | Mod: 59

## 2023-01-30 ASSESSMENT — LOCATION ZONE DERM
LOCATION ZONE: TRUNK
LOCATION ZONE: NECK
LOCATION ZONE: FACE
LOCATION ZONE: SCALP
LOCATION ZONE: NOSE
LOCATION ZONE: ARM
LOCATION ZONE: NOSE

## 2023-01-30 ASSESSMENT — LOCATION SIMPLE DESCRIPTION DERM
LOCATION SIMPLE: RIGHT FOREHEAD
LOCATION SIMPLE: RIGHT UPPER BACK
LOCATION SIMPLE: LEFT UPPER BACK
LOCATION SIMPLE: NOSE
LOCATION SIMPLE: LEFT FOREARM
LOCATION SIMPLE: ANTERIOR SCALP
LOCATION SIMPLE: NOSE
LOCATION SIMPLE: RIGHT ANTERIOR NECK
LOCATION SIMPLE: UPPER BACK
LOCATION SIMPLE: LEFT SCALP
LOCATION SIMPLE: RIGHT SCALP

## 2023-01-30 ASSESSMENT — LOCATION DETAILED DESCRIPTION DERM
LOCATION DETAILED: RIGHT CLAVICULAR NECK
LOCATION DETAILED: MID-FRONTAL SCALP
LOCATION DETAILED: RIGHT SUPERIOR MEDIAL UPPER BACK
LOCATION DETAILED: LEFT INFERIOR MEDIAL UPPER BACK
LOCATION DETAILED: RIGHT SUPERIOR FOREHEAD
LOCATION DETAILED: RIGHT CENTRAL FRONTAL SCALP
LOCATION DETAILED: LEFT MEDIAL FRONTAL SCALP
LOCATION DETAILED: RIGHT INFERIOR ANTERIOR NECK
LOCATION DETAILED: LEFT MEDIAL UPPER BACK
LOCATION DETAILED: COLUMELLA
LOCATION DETAILED: COLUMELLA
LOCATION DETAILED: INFERIOR THORACIC SPINE
LOCATION DETAILED: LEFT DISTAL DORSAL FOREARM

## 2023-01-30 NOTE — PROCEDURE: MIPS QUALITY
Quality 431: Preventive Care And Screening: Unhealthy Alcohol Use - Screening: Patient not identified as an unhealthy alcohol user when screened for unhealthy alcohol use using a systematic screening method
Quality 226: Preventive Care And Screening: Tobacco Use: Screening And Cessation Intervention: Patient screened for tobacco use and is an ex/non-smoker
Detail Level: Detailed
Quality 110: Preventive Care And Screening: Influenza Immunization: Influenza Immunization not Administered because Patient Refused.
Quality 130: Documentation Of Current Medications In The Medical Record: Current Medications Documented
Quality 111:Pneumonia Vaccination Status For Older Adults: Pneumococcal vaccine (PPSV23) administered on or after patient’s 60th birthday and before the end of the measurement period

## 2023-01-30 NOTE — PROCEDURE: LIQUID NITROGEN
Show Aperture Variable?: Yes
Spray Paint Technique: No
Medical Necessity Information: It is in your best interest to select a reason for this procedure from the list below. All of these items fulfill various CMS LCD requirements except the new and changing color options.
Medical Necessity Clause: This procedure was medically necessary because the lesions that were treated were:
Spray Paint Text: The liquid nitrogen was applied to the skin utilizing a spray paint frosting technique.
Post-Care Instructions: I reviewed with the patient in detail post-care instructions. Patient is to wear sunprotection, and avoid picking at any of the treated lesions. Pt may apply Vaseline to crusted or scabbing areas.
Number Of Freeze-Thaw Cycles: 3 freeze-thaw cycles
Detail Level: Zone
Number Of Freeze-Thaw Cycles: 2 freeze-thaw cycles
Duration Of Freeze Thaw-Cycle (Seconds): 5-10
Duration Of Freeze Thaw-Cycle (Seconds): 5
Post-Care Instructions: - Patient was instructed to avoid picking at any of the treated lesions.
Post-Care Instructions: - Avoid picking at any of the treated lesions.
Detail Level: Detailed
Number Of Freeze-Thaw Cycles: 1 freeze-thaw cycle
Consent: - Verbal and written consent was obtained, and risks were reviewed prior to procedure today. \\n- Risks discussed include but are not limited to pain, crusting, scabbing, blistering, scarring, temporary or permanent darker or lighter pigmentary change, recurrence, incomplete resolution, and infection.
Consent: The patient's consent was obtained including but not limited to risks of crusting, scabbing, blistering, scarring, darker or lighter pigmentary change, recurrence, incomplete removal and infection.
Application Tool (Optional): Liquid Nitrogen Sprayer
Consent: - Discussed that these are a result of cumulative sun exposure.\\n- Verbal and written consent was obtained, and risks were reviewed prior to procedure today. \\n- Risks discussed include but are not limited to pain, crusting, scabbing, blistering, scarring, temporary or permanent darker or lighter pigmentary change, recurrence, incomplete resolution, and infection.
The patient is a 8y8m Female complaining of urinary symptoms.

## 2023-02-13 ENCOUNTER — ANCILLARY PROCEDURE (OUTPATIENT)
Dept: CT IMAGING | Facility: CLINIC | Age: 68
End: 2023-02-13
Attending: UROLOGY
Payer: COMMERCIAL

## 2023-02-13 DIAGNOSIS — N20.0 CALCULUS OF KIDNEY: ICD-10-CM

## 2023-02-13 PROCEDURE — 74176 CT ABD & PELVIS W/O CONTRAST: CPT

## 2023-02-15 ENCOUNTER — VIRTUAL VISIT (OUTPATIENT)
Dept: UROLOGY | Facility: CLINIC | Age: 68
End: 2023-02-15
Payer: COMMERCIAL

## 2023-02-15 VITALS — HEIGHT: 69 IN | BODY MASS INDEX: 32.58 KG/M2 | WEIGHT: 220 LBS

## 2023-02-15 DIAGNOSIS — N20.1 URETERAL STONE: Primary | ICD-10-CM

## 2023-02-15 DIAGNOSIS — N20.0 CALCULUS OF KIDNEY: Primary | ICD-10-CM

## 2023-02-15 PROCEDURE — 99214 OFFICE O/P EST MOD 30 MIN: CPT | Mod: VID | Performed by: PHYSICIAN ASSISTANT

## 2023-02-15 RX ORDER — CEFAZOLIN SODIUM 2 G/50ML
2 SOLUTION INTRAVENOUS SEE ADMIN INSTRUCTIONS
Status: CANCELLED | OUTPATIENT
Start: 2023-02-15

## 2023-02-15 RX ORDER — TAMSULOSIN HYDROCHLORIDE 0.4 MG/1
0.4 CAPSULE ORAL DAILY
Qty: 21 CAPSULE | Refills: 0 | Status: SHIPPED | OUTPATIENT
Start: 2023-02-15 | End: 2024-01-11

## 2023-02-15 RX ORDER — CEFAZOLIN SODIUM 2 G/50ML
2 SOLUTION INTRAVENOUS
Status: CANCELLED | OUTPATIENT
Start: 2023-02-15

## 2023-02-15 ASSESSMENT — PAIN SCALES - GENERAL: PAINLEVEL: MILD PAIN (2)

## 2023-02-15 NOTE — PROGRESS NOTES
Pt will meet you in [employer]    Billie is a 67 year old who is being evaluated via a billable video visit.      How would you like to obtain your AVS? [employer]  If the video visit is dropped, the invitation should be resent by: Text to cell phone: [phone]  Will anyone else be joining your video visit? No      Video-Visit Details    Type of service:  Video Visit   Video Start Time: 1:32 PM  Video End Time:1:52 PM    Originating Location (pt. Location): Home    Distant Location (provider location):  On-site  Platform used for Video Visit: Donald      CC: stone follow-up     HPI:  Billie Aden is a pleasant 67 year old male who presents in HFU for evaluation of the above. Seen by Dr. Salamanca as hospital consult on 1/22/23 (admitted for pain control). He presented to the ED 1/21/23 with right-sided abdominal pain. He was diagnosed with a 4 mm mid right ureteral stone (2mm right renal pelvis).      He has had a prior history of stones.  He has always been able to pass his stones and has never required surgery.    Follow-up CT on 2/13/23 noted stone has moved distally and is present just below level of the iliac crest vessels with resolution of prior right-sided hydronephrosis.    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     Nephrolithiasis      Pelvic fracture (H) 11/2014    occured while horseback riding       Past Surgical History:   Procedure Laterality Date     COLONOSCOPY       COSMETIC SURGERY  11/20/2017     HC CLOSED TX SHOULDER DISLOC W MANIP NO ANESTH       LAPAROSCOPIC CHOLECYSTECTOMY N/A 12/23/2014    Surgeon: Avelino Bryson MD;  [facility]:  [facility]     REPAIR MOHS Left 1/25/2018    Procedure: REPAIR MOHS;  Left Lower Eyelid Mohs  "Reconstruction;  Surgeon: Claudia Melvin MD;  Location:  OR       Social History     Socioeconomic History     Marital status:      Spouse name: Not on file     Number of children: 2     Years of education: Not on file     Highest education level: Not on file   Occupational History     Occupation: [employer]   Tobacco Use     Smoking status: Never     Smokeless tobacco: Never   Substance and Sexual Activity     Alcohol use: Yes     Comment: occasionally     Drug use: No     Sexual activity: Not Currently     Partners: Female   Other Topics Concern     Parent/sibling w/ CABG, MI or angioplasty before 65F 55M? No   Social History Narrative    , 2 children    Self Employed - semi-retired     Social Determinants of Health     Financial Resource Strain: Not on file   Food Insecurity: Not on file   Transportation Needs: Not on file   Physical Activity: Not on file   Stress: Not on file   Social Connections: Not on file   Intimate Partner Violence: Not on file   Housing Stability: Not on file       Family History   Problem Relation Age of Onset     Cancer - colorectal Mother 79     Cardiovascular Father 70        Had a carotid endarterectomy at age 70 years.  Tobacco user.     Heart Surgery Brother 66        Alive. Had CABG at age 66 years. Smoker     Diabetes Type 2  Brother      Melanoma Brother        Allergies   Allergen Reactions     Dilaudid [Hydromorphone]      Agitation, did not help with pain     Morphine Anxiety       Current Outpatient Medications   Medication     amLODIPine (NORVASC) 5 MG tablet     aspirin (ASA) 325 MG EC tablet     atorvastatin (LIPITOR) 40 MG tablet     oxyCODONE (ROXICODONE) 5 MG tablet     tamsulosin (FLOMAX) 0.4 MG capsule     fluticasone (FLONASE) 50 MCG/ACT nasal spray     No current facility-administered medications for this visit.         PEx:   Height 1.753 m (5' 9\"), weight 99.8 kg (220 lb).    PSYCH: NAD  EYES: EOMI  MOUTH: MMM  NEURO: AAO " x3    Urine:  EXAM: CT ABDOMEN PELVIS W/O CONTRAST  LOCATION: [facility]  DATE/TIME: 2/13/2023 8:11 AM     INDICATION:  Calculus of kidney. Previous 4 mm obstructing calculus right ureter.  COMPARISON: 01/22/2023, 08/07/2021, 03/27/2020.  TECHNIQUE: CT scan of the abdomen and pelvis was performed without IV contrast. Multiplanar reformats were obtained. Dose reduction techniques were used.  CONTRAST: None.     FINDINGS:   LOWER CHEST: Coronary artery calcification.     HEPATOBILIARY: Hepatic steatosis. Cholecystectomy. No biliary dilatation allowing for postcholecystectomy reservoir state.     PANCREAS: No ductal dilatation or acute inflammatory change.     SPLEEN: Normal size.     ADRENAL GLANDS: Unremarkable.     KIDNEYS/BLADDER: 2 mm stone fragment present in the right renal pelvis without significant right-sided hydronephrosis. Faint 1 mm calcific fragment right lower renal pole. Interval distal migration of the 4 mm right ureteral calculus now present just   below level of the iliac vessels. No left-sided urinary collecting system dilatation or calculi. No bladder calculi. Exophytic 1.6 cm homogeneously low-attenuation cystic lesion posterior aspect right kidney having Hounsfield units most compatible with a   cyst for which no further imaging follow-up is necessary. Minimal prostate enlargement.     BOWEL: No bowel dilatation or overt inflammatory change. Extensive colonic diverticulosis. Appendix unremarkable.     LYMPH NODES: No lymphadenopathy.     VASCULATURE: Normal caliber aorta. Moderate vascular calcification.     PELVIC ORGANS: No free fluid.     MUSCULOSKELETAL: Fat-containing bilateral inguinal hernias, greater on the right. Moderate degenerative osteoarthrosis both hips. Moderate to marked degenerative changes at the SI joints with hypertrophic changes anteriorly. Moderate lower lumbar facet   arthropathy. Minimal to moderate degenerative disc disease L4-L5.                                                                       IMPRESSION:   1.  Interval distal migration of the prior right ureteral calculus. The calculus now measures 4 mm and is present just below level of the iliac crest vessels. Resolution of prior right-sided hydronephrosis.     2.  2 mm stone fragment present in the right renal pelvis.     3.  1 mm faint calcific fragment in the right lower renal pole.     4.  No left-sided urinary calculi.     5.  1.6 cm homogeneously low-attenuation right renal cystic lesion for which no further imaging follow-up is necessary.     6.  Hepatic steatosis.     7.  Coronary artery calcification.       A/P: Billie Aden is a 67 year old male with persistent right ureteral stone  -We discussed continued MET vs surgical intervention with cysto, URS, HLL, and stenting. The risks and benefits of both reviewed. He wishes to proceed with URS.   -Will need pre-op   -Flomax refilled  -Our office will call to arrange outpatient procedure.   -Reasons to go to ER reviewed (fevers, chills, uncontrolled pain).        Amara Ortez PA-C  [facility]        28 minutes spent on the date of the encounter doing chart review, review of outside records, review of test results, interpretation of tests, patient visit and documentation

## 2023-02-15 NOTE — LETTER
2/15/2023       RE: Billie Aden  [address]  [place] [address]     Dear Colleague,    Thank you for referring your patient, Billie Aden, to the [facility] [place] at [facility]. Please see a copy of my visit note below.    Pt will meet you in [employer]    Billie is a 67 year old who is being evaluated via a billable video visit.      How would you like to obtain your AVS? [facility]  If the video visit is dropped, the invitation should be resent by: Text to cell phone: [phone]  Will anyone else be joining your video visit? No      Video-Visit Details    Type of service:  Video Visit   Video Start Time: 1:32 PM  Video End Time:1:52 PM    Originating Location (pt. Location): Home    Distant Location (provider location):  On-site  Platform used for Video Visit: Donald      CC: stone follow-up     HPI:  Billie Aden is a pleasant 67 year old male who presents in HFU for evaluation of the above. Seen by Dr. Salamanca as hospital consult on 1/22/23 (admitted for pain control). He presented to the ED 1/21/23 with right-sided abdominal pain. He was diagnosed with a 4 mm mid right ureteral stone (2mm right renal pelvis).      He has had a prior history of stones.  He has always been able to pass his stones and has never required surgery.    Follow-up CT on 2/13/23 noted stone has moved distally and is present just below level of the iliac crest vessels with resolution of prior right-sided hydronephrosis.    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     Nephrolithiasis      Pelvic fracture (H) 11/2014    occured while horseback riding       Past Surgical History:   Procedure  Laterality Date     COLONOSCOPY       COSMETIC SURGERY  11/20/2017     HC CLOSED TX SHOULDER DISLOC W MANIP NO ANESTH       LAPAROSCOPIC CHOLECYSTECTOMY N/A 12/23/2014    Surgeon: Avelino Bryson MD;  Location: [facility]     REPAIR MOHS Left 1/25/2018    Procedure: REPAIR MOHS;  Left Lower Eyelid Mohs Reconstruction;  Surgeon: Claudia Melvin MD;  Location:  OR       Social History     Socioeconomic History     Marital status:      Spouse name: Not on file     Number of children: 2     Years of education: Not on file     Highest education level: Not on file   Occupational History     Occupation: [employer]   Tobacco Use     Smoking status: Never     Smokeless tobacco: Never   Substance and Sexual Activity     Alcohol use: Yes     Comment: occasionally     Drug use: No     Sexual activity: Not Currently     Partners: Female   Other Topics Concern     Parent/sibling w/ CABG, MI or angioplasty before 65F 55M? No   Social History Narrative    , 2 children    Self Employed - semi-retired     Social Determinants of Health     Financial Resource Strain: Not on file   Food Insecurity: Not on file   Transportation Needs: Not on file   Physical Activity: Not on file   Stress: Not on file   Social Connections: Not on file   Intimate Partner Violence: Not on file   Housing Stability: Not on file       Family History   Problem Relation Age of Onset     Cancer - colorectal Mother 79     Cardiovascular Father 70        Had a carotid endarterectomy at age 70 years.  Tobacco user.     Heart Surgery Brother 66        Alive. Had CABG at age 66 years. Smoker     Diabetes Type 2  Brother      Melanoma Brother        Allergies   Allergen Reactions     Dilaudid [Hydromorphone]      Agitation, did not help with pain     Morphine Anxiety       Current Outpatient Medications   Medication     amLODIPine (NORVASC) 5 MG tablet     aspirin (ASA) 325 MG EC tablet     atorvastatin (LIPITOR) 40 MG tablet     oxyCODONE  "(ROXICODONE) 5 MG tablet     tamsulosin (FLOMAX) 0.4 MG capsule     fluticasone (FLONASE) 50 MCG/ACT nasal spray     No current facility-administered medications for this visit.         PEx:   Height 1.753 m (5' 9\"), weight 99.8 kg (220 lb).    PSYCH: NAD  EYES: EOMI  MOUTH: MMM  NEURO: AAO x3    Urine:  EXAM: CT ABDOMEN PELVIS W/O CONTRAST  LOCATION: [facility]  DATE/TIME: 2/13/2023 8:11 AM     INDICATION:  Calculus of kidney. Previous 4 mm obstructing calculus right ureter.  COMPARISON: 01/22/2023, 08/07/2021, 03/27/2020.  TECHNIQUE: CT scan of the abdomen and pelvis was performed without IV contrast. Multiplanar reformats were obtained. Dose reduction techniques were used.  CONTRAST: None.     FINDINGS:   LOWER CHEST: Coronary artery calcification.     HEPATOBILIARY: Hepatic steatosis. Cholecystectomy. No biliary dilatation allowing for postcholecystectomy reservoir state.     PANCREAS: No ductal dilatation or acute inflammatory change.     SPLEEN: Normal size.     ADRENAL GLANDS: Unremarkable.     KIDNEYS/BLADDER: 2 mm stone fragment present in the right renal pelvis without significant right-sided hydronephrosis. Faint 1 mm calcific fragment right lower renal pole. Interval distal migration of the 4 mm right ureteral calculus now present just   below level of the iliac vessels. No left-sided urinary collecting system dilatation or calculi. No bladder calculi. Exophytic 1.6 cm homogeneously low-attenuation cystic lesion posterior aspect right kidney having Hounsfield units most compatible with a   cyst for which no further imaging follow-up is necessary. Minimal prostate enlargement.     BOWEL: No bowel dilatation or overt inflammatory change. Extensive colonic diverticulosis. Appendix unremarkable.     LYMPH NODES: No lymphadenopathy.     VASCULATURE: Normal caliber aorta. Moderate vascular calcification.     PELVIC ORGANS: No free fluid.     MUSCULOSKELETAL: Fat-containing bilateral " inguinal hernias, greater on the right. Moderate degenerative osteoarthrosis both hips. Moderate to marked degenerative changes at the SI joints with hypertrophic changes anteriorly. Moderate lower lumbar facet   arthropathy. Minimal to moderate degenerative disc disease L4-L5.                                                                      IMPRESSION:   1.  Interval distal migration of the prior right ureteral calculus. The calculus now measures 4 mm and is present just below level of the iliac crest vessels. Resolution of prior right-sided hydronephrosis.     2.  2 mm stone fragment present in the right renal pelvis.     3.  1 mm faint calcific fragment in the right lower renal pole.     4.  No left-sided urinary calculi.     5.  1.6 cm homogeneously low-attenuation right renal cystic lesion for which no further imaging follow-up is necessary.     6.  Hepatic steatosis.     7.  Coronary artery calcification.       A/P: Billie Aden is a 67 year old male with persistent right ureteral stone  -We discussed continued MET vs surgical intervention with cysto, URS, HLL, and stenting. The risks and benefits of both reviewed. He wishes to proceed with URS.   -Will need pre-op   -Flomax refilled  -Our office will call to arrange outpatient procedure.   -Reasons to go to ER reviewed (fevers, chills, uncontrolled pain).        Amara Ortez PA-C  [facility]        28 minutes spent on the date of the encounter doing chart review, review of outside records, review of test results, interpretation of tests, patient visit and documentation

## 2023-02-21 ENCOUNTER — APPOINTMENT (OUTPATIENT)
Dept: GENERAL RADIOLOGY | Facility: CLINIC | Age: 68
End: 2023-02-21
Attending: UROLOGY
Payer: COMMERCIAL

## 2023-02-21 ENCOUNTER — HOSPITAL ENCOUNTER (OUTPATIENT)
Facility: CLINIC | Age: 68
Discharge: HOME | End: 2023-02-21
Attending: UROLOGY | Admitting: UROLOGY
Payer: COMMERCIAL

## 2023-02-21 ENCOUNTER — ANESTHESIA EVENT (OUTPATIENT)
Dept: SURGERY | Facility: CLINIC | Age: 68
End: 2023-02-21
Payer: COMMERCIAL

## 2023-02-21 ENCOUNTER — ANESTHESIA (OUTPATIENT)
Dept: SURGERY | Facility: CLINIC | Age: 68
End: 2023-02-21
Payer: COMMERCIAL

## 2023-02-21 VITALS
WEIGHT: 224 LBS | DIASTOLIC BLOOD PRESSURE: 95 MMHG | RESPIRATION RATE: 16 BRPM | OXYGEN SATURATION: 97 % | HEART RATE: 79 BPM | HEIGHT: 69 IN | SYSTOLIC BLOOD PRESSURE: 134 MMHG | BODY MASS INDEX: 33.18 KG/M2 | TEMPERATURE: 97.5 F

## 2023-02-21 PROCEDURE — 258N000003 HC RX IP 258 OP 636: Performed by: ANESTHESIOLOGY

## 2023-02-21 PROCEDURE — C1894 INTRO/SHEATH, NON-LASER: HCPCS | Performed by: UROLOGY

## 2023-02-21 PROCEDURE — 250N000009 HC RX 250: Performed by: ANESTHESIOLOGY

## 2023-02-21 PROCEDURE — 272N000001 HC OR GENERAL SUPPLY STERILE: Performed by: UROLOGY

## 2023-02-21 PROCEDURE — 74420 UROGRAPHY RTRGR +-KUB: CPT | Mod: 26 | Performed by: UROLOGY

## 2023-02-21 PROCEDURE — 710N000009 HC RECOVERY PHASE 1, LEVEL 1, PER MIN: Performed by: UROLOGY

## 2023-02-21 PROCEDURE — 999N000179 XR SURGERY CARM FLUORO LESS THAN 5 MIN W STILLS: Mod: TC

## 2023-02-21 PROCEDURE — 52356 CYSTO/URETERO W/LITHOTRIPSY: CPT | Mod: RT | Performed by: UROLOGY

## 2023-02-21 PROCEDURE — 710N000012 HC RECOVERY PHASE 2, PER MINUTE: Performed by: UROLOGY

## 2023-02-21 PROCEDURE — 250N000011 HC RX IP 250 OP 636: Performed by: UROLOGY

## 2023-02-21 PROCEDURE — 255N000002 HC RX 255 OP 636: Performed by: UROLOGY

## 2023-02-21 PROCEDURE — 360N000076 HC SURGERY LEVEL 3, PER MIN: Performed by: UROLOGY

## 2023-02-21 PROCEDURE — C1769 GUIDE WIRE: HCPCS | Performed by: UROLOGY

## 2023-02-21 PROCEDURE — 370N000017 HC ANESTHESIA TECHNICAL FEE, PER MIN: Performed by: UROLOGY

## 2023-02-21 PROCEDURE — C2617 STENT, NON-COR, TEM W/O DEL: HCPCS | Performed by: UROLOGY

## 2023-02-21 PROCEDURE — 999N000141 HC STATISTIC PRE-PROCEDURE NURSING ASSESSMENT: Performed by: UROLOGY

## 2023-02-21 PROCEDURE — 82365 CALCULUS SPECTROSCOPY: CPT | Performed by: UROLOGY

## 2023-02-21 PROCEDURE — 250N000011 HC RX IP 250 OP 636: Performed by: ANESTHESIOLOGY

## 2023-02-21 PROCEDURE — 250N000025 HC SEVOFLURANE, PER MIN: Performed by: UROLOGY

## 2023-02-21 DEVICE — URETERAL STENT
Type: IMPLANTABLE DEVICE | Site: URETHRA | Status: FUNCTIONAL
Brand: POLARIS™ ULTRA

## 2023-02-21 RX ORDER — HYDROMORPHONE HCL IN WATER/PF 6 MG/30 ML
0.4 PATIENT CONTROLLED ANALGESIA SYRINGE INTRAVENOUS EVERY 5 MIN PRN
Status: DISCONTINUED | OUTPATIENT
Start: 2023-02-21 | End: 2023-02-21 | Stop reason: HOSPADM

## 2023-02-21 RX ORDER — SODIUM CHLORIDE, SODIUM LACTATE, POTASSIUM CHLORIDE, CALCIUM CHLORIDE 600; 310; 30; 20 MG/100ML; MG/100ML; MG/100ML; MG/100ML
INJECTION, SOLUTION INTRAVENOUS CONTINUOUS
Status: DISCONTINUED | OUTPATIENT
Start: 2023-02-21 | End: 2023-02-21 | Stop reason: HOSPADM

## 2023-02-21 RX ORDER — DEXAMETHASONE SODIUM PHOSPHATE 4 MG/ML
INJECTION, SOLUTION INTRA-ARTICULAR; INTRALESIONAL; INTRAMUSCULAR; INTRAVENOUS; SOFT TISSUE PRN
Status: DISCONTINUED | OUTPATIENT
Start: 2023-02-21 | End: 2023-02-21

## 2023-02-21 RX ORDER — LIDOCAINE HYDROCHLORIDE 20 MG/ML
INJECTION, SOLUTION INFILTRATION; PERINEURAL PRN
Status: DISCONTINUED | OUTPATIENT
Start: 2023-02-21 | End: 2023-02-21

## 2023-02-21 RX ORDER — FENTANYL CITRATE 50 UG/ML
INJECTION, SOLUTION INTRAMUSCULAR; INTRAVENOUS PRN
Status: DISCONTINUED | OUTPATIENT
Start: 2023-02-21 | End: 2023-02-21

## 2023-02-21 RX ORDER — CEFAZOLIN SODIUM/WATER 2 G/20 ML
2 SYRINGE (ML) INTRAVENOUS
Status: COMPLETED | OUTPATIENT
Start: 2023-02-21 | End: 2023-02-21

## 2023-02-21 RX ORDER — FENTANYL CITRATE 0.05 MG/ML
50 INJECTION, SOLUTION INTRAMUSCULAR; INTRAVENOUS EVERY 5 MIN PRN
Status: DISCONTINUED | OUTPATIENT
Start: 2023-02-21 | End: 2023-02-21 | Stop reason: HOSPADM

## 2023-02-21 RX ORDER — CEFAZOLIN SODIUM/WATER 2 G/20 ML
2 SYRINGE (ML) INTRAVENOUS SEE ADMIN INSTRUCTIONS
Status: DISCONTINUED | OUTPATIENT
Start: 2023-02-21 | End: 2023-02-21 | Stop reason: HOSPADM

## 2023-02-21 RX ORDER — FENTANYL CITRATE 0.05 MG/ML
25 INJECTION, SOLUTION INTRAMUSCULAR; INTRAVENOUS EVERY 5 MIN PRN
Status: DISCONTINUED | OUTPATIENT
Start: 2023-02-21 | End: 2023-02-21 | Stop reason: HOSPADM

## 2023-02-21 RX ORDER — HYDROMORPHONE HCL IN WATER/PF 6 MG/30 ML
0.2 PATIENT CONTROLLED ANALGESIA SYRINGE INTRAVENOUS EVERY 5 MIN PRN
Status: DISCONTINUED | OUTPATIENT
Start: 2023-02-21 | End: 2023-02-21 | Stop reason: HOSPADM

## 2023-02-21 RX ORDER — ONDANSETRON 2 MG/ML
4 INJECTION INTRAMUSCULAR; INTRAVENOUS EVERY 30 MIN PRN
Status: DISCONTINUED | OUTPATIENT
Start: 2023-02-21 | End: 2023-02-21 | Stop reason: HOSPADM

## 2023-02-21 RX ORDER — ONDANSETRON 4 MG/1
4 TABLET, ORALLY DISINTEGRATING ORAL EVERY 30 MIN PRN
Status: DISCONTINUED | OUTPATIENT
Start: 2023-02-21 | End: 2023-02-21 | Stop reason: HOSPADM

## 2023-02-21 RX ORDER — PROPOFOL 10 MG/ML
INJECTION, EMULSION INTRAVENOUS PRN
Status: DISCONTINUED | OUTPATIENT
Start: 2023-02-21 | End: 2023-02-21

## 2023-02-21 RX ORDER — ONDANSETRON 2 MG/ML
INJECTION INTRAMUSCULAR; INTRAVENOUS PRN
Status: DISCONTINUED | OUTPATIENT
Start: 2023-02-21 | End: 2023-02-21

## 2023-02-21 RX ADMIN — PROPOFOL 200 MG: 10 INJECTION, EMULSION INTRAVENOUS at 12:13

## 2023-02-21 RX ADMIN — DEXAMETHASONE SODIUM PHOSPHATE 4 MG: 4 INJECTION, SOLUTION INTRA-ARTICULAR; INTRALESIONAL; INTRAMUSCULAR; INTRAVENOUS; SOFT TISSUE at 12:21

## 2023-02-21 RX ADMIN — SODIUM CHLORIDE, POTASSIUM CHLORIDE, SODIUM LACTATE AND CALCIUM CHLORIDE: 600; 310; 30; 20 INJECTION, SOLUTION INTRAVENOUS at 11:06

## 2023-02-21 RX ADMIN — LIDOCAINE HYDROCHLORIDE 40 MG: 20 INJECTION, SOLUTION INFILTRATION; PERINEURAL at 12:13

## 2023-02-21 RX ADMIN — FENTANYL CITRATE 50 MCG: 50 INJECTION, SOLUTION INTRAMUSCULAR; INTRAVENOUS at 12:29

## 2023-02-21 RX ADMIN — PHENYLEPHRINE HYDROCHLORIDE 100 MCG: 10 INJECTION INTRAVENOUS at 12:38

## 2023-02-21 RX ADMIN — Medication 2 G: at 12:02

## 2023-02-21 RX ADMIN — FENTANYL CITRATE 50 MCG: 50 INJECTION, SOLUTION INTRAMUSCULAR; INTRAVENOUS at 12:13

## 2023-02-21 RX ADMIN — ONDANSETRON 4 MG: 2 INJECTION INTRAMUSCULAR; INTRAVENOUS at 12:21

## 2023-02-21 RX ADMIN — MIDAZOLAM 2 MG: 1 INJECTION INTRAMUSCULAR; INTRAVENOUS at 12:13

## 2023-02-21 ASSESSMENT — ACTIVITIES OF DAILY LIVING (ADL)
ADLS_ACUITY_SCORE: 35
ADLS_ACUITY_SCORE: 35

## 2023-02-21 NOTE — OP NOTE
Procedure Date: 02/21/2023    SURGEON:  Parvez Salamanca M.D.     PREOPERATIVE DIAGNOSIS:  Right ureteral stone.    POSTOPERATIVE DIAGNOSIS:  Right ureteral stone.    PROCEDURE PERFORMED:  Cystoscopy, right retrograde pyelogram, interpretation of fluoroscopic images, right ureteroscopy with holmium laser lithotripsy and stone basketing, placement of right sided double-J ureteral stent.    ANESTHESIA:  General.    COMPLICATIONS:  None.    INDICATIONS FOR PROCEDURE:  Billie Aden is a 67-year-old gentleman with a distal right ureteral stone who now presents for removal.    PROCEDURE:  Risks and benefits of the procedure were explained in detail to the patient and informed consent was obtained.    DESCRIPTION OF PROCEDURE:  The patient was brought to the operating room and placed supine on the operative table under general endotracheal anesthetic.  He was then moved down to the dorsal lithotomy position where he was prepped and draped in standard sterile fashion.    The procedure began by me introducing the 22-Yi cystoscope through the urethra into the bladder and performing cystoscopy.  There were no urothelial abnormalities identified.  I cannulated the right ureteral orifice with a ureteral catheter and performed retrograde pyelogram.  There was kinking of the distal right ureter, but otherwise, no abnormalities identified.  I passed a Glidewire into the right kidney and backed off the ureteral catheter along with the scope.  Alongside the Glidewire, I passed the rigid ureteroscope through the urethra and into the bladder and up the ureter.  The stone was found lodged in the distal right ureter in an area of tortuosity.  I used a 365 micron holmium laser fiber to break up the stone into multiple fragments.  These fragments were then basketed out and placed in the bladder.  I then performed ureteroscopy to the mid ureter.  No stones remained.  I removed the ureteroscope.  Over the remaining Glidewire, I passed the  rigid cystoscope through the urethra into the bladder until I could visualize the right ureteral orifice.  I passed a 5 x 26 double-J ureteral stent over the wire.  The wire was pulled back and a good curl was seen on fluoroscopy.  A good curl could be seen in the bladder under direct visualization.  The bladder was drained and I collected stone fragments and sent them for analysis.  The scope was removed and the procedure was concluded.     The patient tolerated the procedure well without complications.  He went to post-anesthetic care unit in good condition.  He will go home from there.  He will come back to clinic in 1 to 2 weeks for stent removal in the office.    Parvez Salamanca MD        D: 2023   T: 2023   MT: SCOTT    Name:     RICHARD FERNANDES  MRN:      [id number]        Account:        [id number]   :      1955           Procedure Date: 2023     Document: [id number]

## 2023-02-21 NOTE — ANESTHESIA POSTPROCEDURE EVALUATION
Patient: Billie Aden    Procedure: Procedure(s):  Cystoscopy, right ureteroscopy with laser lithotripsy and stone basketing. Right ureteral stent placement       Anesthesia Type:  General    Note:  Disposition: Outpatient   Postop Pain Control: Uneventful            Sign Out: Well controlled pain   PONV: No   Neuro/Psych: Uneventful            Sign Out: Acceptable/Baseline neuro status   Airway/Respiratory: Uneventful            Sign Out: Acceptable/Baseline resp. status   CV/Hemodynamics: Uneventful            Sign Out: Acceptable CV status   Other NRE:    DID A NON-ROUTINE EVENT OCCUR? No           Last vitals:  Vitals Value Taken Time   /81 02/21/23 1400   Temp     Pulse 82 02/21/23 1401   Resp 15 02/21/23 1401   SpO2 95 % 02/21/23 1401   Vitals shown include unvalidated device data.    Electronically Signed By: Elke Roman  February 21, 2023  2:28 PM

## 2023-02-21 NOTE — ANESTHESIA PREPROCEDURE EVALUATION
Anesthesia Pre-Procedure Evaluation    Patient: Billie Aden   MRN: [id number] : 1955        Procedure : Procedure(s):  Cystoscopy, right ureteroscopy with laser lithotripsy and stone basketing. Right ureteral stent placement          Past Medical History:   Diagnosis Date     Arthritis      BCC (basal cell carcinoma of skin) 2017    left lower lid     Cerebrovascular accident (CVA), unspecified mechanism (H) 2021     Cholelithiasis 10/8/2013     Heart disease      Hypertension 2018     Inferior mesenteric vein thrombosis (H) 2020     Nephrolithiasis      Pelvic fracture (H) 2014    occured while horseback riding      Past Surgical History:   Procedure Laterality Date     COLONOSCOPY       COSMETIC SURGERY  2017     HC CLOSED TX SHOULDER DISLOC W MANIP NO ANESTH       LAPAROSCOPIC CHOLECYSTECTOMY N/A 2014    Surgeon: Avelino Bryson MD;  Location: [facility]     REPAIR MOHS Left 2018    Procedure: REPAIR MOHS;  Left Lower Eyelid Mohs Reconstruction;  Surgeon: Claudia Melvin MD;  [facility]      Allergies   Allergen Reactions     Dilaudid [Hydromorphone]      Agitation, did not help with pain     Morphine Anxiety      Social History     Tobacco Use     Smoking status: Never     Smokeless tobacco: Never   Substance Use Topics     Alcohol use: Yes     Comment: occasionally      Wt Readings from Last 1 Encounters:   23 101.6 kg (224 lb)        Anesthesia Evaluation            ROS/MED HX  ENT/Pulmonary:       Neurologic:     (+) peripheral neuropathy, CVA, without deficits,     Cardiovascular:     (+) Dyslipidemia hypertension--CAD ---    METS/Exercise Tolerance:     Hematologic:     (+) History of blood clots,     Musculoskeletal:       GI/Hepatic:     (+) liver disease (fatty liver disease),  (-) GERD   Renal/Genitourinary:     (+) renal disease, type: CRI, Nephrolithiasis ,     Endo:     (+) Obesity,     Psychiatric/Substance Use:       Infectious Disease:        Malignancy:       Other:            Physical Exam    Airway        Mallampati: III   TM distance: > 3 FB   Neck ROM: full     Respiratory Devices and Support         Dental       (+) Modest Abnormalities - crowns, retainers, 1 or 2 missing teeth      Cardiovascular   cardiovascular exam normal          Pulmonary   pulmonary exam normal                OUTSIDE LABS:  CBC:   Lab Results   Component Value Date    WBC 6.1 01/21/2023    WBC 5.7 11/15/2022    HGB 15.8 01/21/2023    HGB 16.0 11/15/2022    HCT 45.6 01/21/2023    HCT 46.2 11/15/2022     01/21/2023     11/15/2022     BMP:   Lab Results   Component Value Date     01/21/2023     11/15/2022    POTASSIUM 4.1 01/21/2023    POTASSIUM 4.4 11/15/2022    CHLORIDE 101 01/21/2023    CHLORIDE 107 11/15/2022    CO2 29 01/21/2023    CO2 24 11/15/2022    BUN 13.7 01/21/2023    BUN 17.6 11/15/2022    CR 1.41 (H) 01/21/2023    CR 1.10 11/15/2022     (H) 01/21/2023     (H) 11/15/2022     COAGS:   Lab Results   Component Value Date    PTT 30 08/06/2021    INR 1.08 08/06/2021     POC: No results found for: BGM, HCG, HCGS  HEPATIC:   Lab Results   Component Value Date    ALBUMIN 4.5 01/21/2023    PROTTOTAL 6.9 01/21/2023    ALT 53 (H) 01/21/2023    AST 36 01/21/2023    ALKPHOS 67 01/21/2023    BILITOTAL 2.2 (H) 01/21/2023     OTHER:   Lab Results   Component Value Date    LACT 1.5 03/27/2020    A1C 6.0 (H) 11/15/2022    ARMANI 9.3 01/21/2023    MAG 2.2 03/28/2020    LIPASE 382 03/27/2020    TSH 2.92 11/15/2022       Anesthesia Plan    ASA Status:  2      Anesthesia Type: General.     - Airway: LMA              Consents    Anesthesia Plan(s) and associated risks, benefits, and realistic alternatives discussed. Questions answered and patient/representative(s) expressed understanding.     - Discussed: Risks, Benefits and Alternatives for BOTH SEDATION and the PROCEDURE were discussed     - Discussed with:  Patient         Postoperative Care        PONV prophylaxis: Ondansetron (or other 5HT-3), Dexamethasone or Solumedrol, Background Propofol Infusion     Comments:                Elke Roman

## 2023-02-21 NOTE — DISCHARGE INSTRUCTIONS
Same Day Surgery Discharge Instructions for  Sedation and General Anesthesia     It's not unusual to feel dizzy, light-headed or faint for up to 24 hours after surgery or while taking pain medication.  If you have these symptoms: sit for a few minutes before standing and have someone assist you when you get up to walk or use the bathroom.    You should rest and relax for the next 24 hours. We recommend you make arrangements to have an adult stay with you for at least 24 hours after your discharge.  Avoid hazardous and strenuous activity.    DO NOT DRIVE any vehicle or operate mechanical equipment for 24 hours following the end of your surgery.  Even though you may feel normal, your reactions may be affected by the medication you have received.    Do not drink alcoholic beverages for 24 hours following surgery.     Slowly progress to your regular diet as you feel able. It's not unusual to feel nauseated and/or vomit after receiving anesthesia.  If you develop these symptoms, drink clear liquids (apple juice, ginger ale, broth, 7-up, etc. ) until you feel better.  If your nausea and vomiting persists for 24 hours, please notify your surgeon.      All narcotic pain medications, along with inactivity and anesthesia, can cause constipation. Drinking plenty of liquids and increasing fiber intake will help.    For any questions of a medical nature, call your surgeon.    Do not make important decisions for 24 hours.    If you had general anesthesia, you may have a sore throat for a couple of days related to the breathing tube used during surgery.  You may use Cepacol lozenges to help with this discomfort.  If it worsens or if you develop a fever, contact your surgeon.     If you feel your pain is not well managed with the pain medications prescribed by your surgeon, please contact your surgeon's office to let them know so they can address your concerns.      Cystoscopy, Holmium Laser with Stent Placement Discharge  Instructions    Holmium laser lithotripsy was used to break up your kidney stone(s). It is normal to have visible blood in the urine, burning, urgency and frequency following this procedure. These symptoms may last for a few days to weeks.     Diet:  To help pass stone fragments and clear the blood out of the urine, it is important to drink 6-8 glasses of fluids per day at home - at least 3-4 glasses should be water.     Return to the diet that you were on before the procedure, unless you are given specific diet instructions.    Activity:  Walk short distances and increase as your strength allows.  You may climb stairs.  Do not do strenuous exercise or heavy lifting until approved by surgeon.  Do not drive while taking narcotic pain medications.    Bathing:  You may take a shower.          Stent information    During surgery, a stent was placed in the ureter.  The ureter is the tube that drains urine from the kidney to the bladder.  The stent is placed to dilate (open) the ureter so the stone fragments can pass easily through the ureter or to decrease ureteral swelling after surgery, or to relieve an obstruction. The stent is made of rubber. The upper end of the stent curls in the kidney while the lower end rests in the bladder.    While the stent is in place you may experience the following symptoms:  Blood and/or small blood clots in urine.  Bladder spasm (frequency and urgency of urination).  Discomfort or aching in the back or side where the stent is.  Burning or discomfort at the end of urine stream.    To decrease these symptoms you should:  Take pain medication as prescribed.  Drink plenty of fluids.  If you experience pain at the end of urination try not emptying your bladder completely.  If having discomfort in back or side, decrease activity.    Call your physician if these signs/symptoms are present:  Pain that is not relieved by a short rest or ordered pain medications.  Temperature at or above 101.0 F  or chills.  Inability or difficulty urinating.   Excessive blood in urine.  Any questions or concerns.

## 2023-02-21 NOTE — ANESTHESIA PROCEDURE NOTES
Airway       Patient location during procedure: OR  Staff -        CRNA: Long Mcallister APRN CRNA       Performed By: CRNA  Consent for Airway        Urgency: elective  Indications and Patient Condition       Indications for airway management: sarbjit-procedural       Induction type:intravenous       Mask difficulty assessment: 0 - not attempted    Final Airway Details       Final airway type: supraglottic airway    Supraglottic Airway Details        Type: LMA       Brand: I-Gel       LMA size: 5    Post intubation assessment        Placement verified by: capnometry, equal breath sounds and chest rise        Number of attempts at approach: 1       Number of other approaches attempted: 0       Secured with: pink tape       Ease of procedure: easy       Dentition: Intact and Unchanged

## 2023-02-21 NOTE — ANESTHESIA CARE TRANSFER NOTE
Patient: Billie Aden    Procedure: Procedure(s):  Cystoscopy, right ureteroscopy with laser lithotripsy and stone basketing. Right ureteral stent placement       Diagnosis: Ureteral stone [N20.1]  Diagnosis Additional Information: No value filed.    Anesthesia Type:   General     Note:    Oropharynx: oropharynx clear of all foreign objects and spontaneously breathing  Level of Consciousness: awake and drowsy  Oxygen Supplementation: face mask  Level of Supplemental Oxygen (L/min / FiO2): 6  Independent Airway: airway patency satisfactory and stable  Dentition: dentition unchanged  Vital Signs Stable: post-procedure vital signs reviewed and stable  Report to RN Given: handoff report given  Patient transferred to: PACU    Handoff Report: Identifed the Patient, Identified the Reponsible Provider, Reviewed the pertinent medical history, Discussed the surgical course, Reviewed Intra-OP anesthesia mangement and issues during anesthesia, Set expectations for post-procedure period and Allowed opportunity for questions and acknowledgement of understanding      Vitals:  Vitals Value Taken Time   /93    Temp 36.9    Pulse 84 02/21/23 1306   Resp 14 02/21/23 1306   SpO2 95 % 02/21/23 1306   Vitals shown include unvalidated device data.    Electronically Signed By: MARÍA ELENA Terrazas CRNA  February 21, 2023  1:07 PM

## 2023-02-23 LAB
APPEARANCE STONE: NORMAL
COMPN STONE: NORMAL
SPECIMEN WT: 16 MG

## 2023-02-24 ENCOUNTER — TELEPHONE (OUTPATIENT)
Dept: UROLOGY | Facility: CLINIC | Age: 68
End: 2023-02-24

## 2023-02-24 NOTE — TELEPHONE ENCOUNTER
[facility] Call Center    Phone Message    May a detailed message be left on voicemail: yes     Reason for Call: Patient is wondering when he should start taking his aspirin again. Please advise. Thank you.    Action Taken: Message routed to:  Other: [facility]    Travel Screening: Not Applicable

## 2023-02-28 ENCOUNTER — OFFICE VISIT (OUTPATIENT)
Dept: UROLOGY | Facility: CLINIC | Age: 68
End: 2023-02-28
Payer: COMMERCIAL

## 2023-02-28 VITALS
HEART RATE: 98 BPM | OXYGEN SATURATION: 96 % | HEIGHT: 69 IN | SYSTOLIC BLOOD PRESSURE: 144 MMHG | BODY MASS INDEX: 32.58 KG/M2 | DIASTOLIC BLOOD PRESSURE: 90 MMHG | WEIGHT: 220 LBS

## 2023-02-28 DIAGNOSIS — N20.1 URETERAL STONE: Primary | ICD-10-CM

## 2023-02-28 DIAGNOSIS — Z79.2 PROPHYLACTIC ANTIBIOTIC: ICD-10-CM

## 2023-02-28 PROCEDURE — 52310 CYSTOSCOPY AND TREATMENT: CPT | Performed by: UROLOGY

## 2023-02-28 PROCEDURE — 99212 OFFICE O/P EST SF 10 MIN: CPT | Mod: 25 | Performed by: UROLOGY

## 2023-02-28 RX ORDER — CIPROFLOXACIN 500 MG/1
500 TABLET, FILM COATED ORAL ONCE
Qty: 1 TABLET | Refills: 0 | Status: SHIPPED | OUTPATIENT
Start: 2023-02-28 | End: 2023-02-28

## 2023-02-28 RX ORDER — LIDOCAINE HYDROCHLORIDE 20 MG/ML
JELLY TOPICAL ONCE
Status: COMPLETED | OUTPATIENT
Start: 2023-02-28 | End: 2023-02-28

## 2023-02-28 RX ADMIN — LIDOCAINE HYDROCHLORIDE 5 ML: 20 JELLY TOPICAL at 10:06

## 2023-02-28 ASSESSMENT — PAIN SCALES - GENERAL: PAINLEVEL: NO PAIN (0)

## 2023-02-28 NOTE — PROGRESS NOTES
Office Visit Note  [facility]  [phone]    UROLOGIC DIAGNOSES:   Right ureteral stone    CURRENT INTERVENTIONS:   S/P extraction    HISTORY:   Billie underwent ureteroscopy with stone extraction in the operating room last week.  He has felt well since the procedure.  His stones are composed of calcium oxalate.  He is here today for stent removal.      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 Right ureteral stent placement;  Surgeon: Parvez Salamanca MD;  Location: [facility] OR     REPAIR MOHS Left 1/25/2018    Procedure: REPAIR MOHS;  Left Lower Eyelid Mohs Reconstruction;  Surgeon: Claudia Melvin MD;  [facility]       FAMILY HISTORY:   Family History   Problem Relation Age of Onset     Cancer - colorectal Mother 79     Cardiovascular Father 70        Had a carotid endarterectomy at age 70 years.  Tobacco user.     Heart Surgery Brother 66        Alive. Had CABG at age 66 years. Smoker     Diabetes Type 2  Brother      Melanoma Brother        SOCIAL HISTORY:   Social History     Socioeconomic History     Marital status:      Number of children: 2    Occupational History     Occupation: [employer]   Tobacco Use     Smoking status: Never     Smokeless tobacco: Never   Vaping Use     Vaping Use: Never used   Substance and Sexual Activity     Alcohol use: Yes     Comment: occasionally     Drug use: No     Sexual activity: Not Currently     Partners: Female   Other Topics Concern     Parent/sibling w/ CABG, MI or angioplasty before 65F 55M? No   Social History Narrative    , 2 children    Self Employed - semi-retired       Review Of Systems:  Skin: No rash, pruritis, or skin pigmentation  Eyes: No changes in vision  Ears/Nose/Throat: No changes in hearing, no nosebleeds  Respiratory: No shortness of breath, dyspnea on exertion, cough, or hemoptysis  Cardiovascular: No chest pain or palpitations  Gastrointestinal: No diarrhea or constipation. No abdominal pain. No hematochezia  Genitourinary: see HPI  Musculoskeletal: No pain or swelling of joints, normal range of motion  Neurologic: No weakness or tremors  Psychiatric: No recent changes in memory or mood  Hematologic/Lymphatic/Immunologic: No easy bruising or enlarged lymph nodes  Endocrine: No weight gain or loss      PHYSICAL EXAM:    There were no vitals taken for this visit.    Constitutional: Well developed. Conversant and in no acute distress  Eyes: Anicteric sclera, conjunctiva clear, normal extraocular movements  ENT: Normocephalic and atraumatic,   Skin: Warm and dry. No rashes or lesions  Cardiac: No peripheral edema  Back/Flank: Not done  CNS/PNS: Normal musculature and movements, moves all extremities normally  Respiratory: Normal non-labored breathing  Abdomen: Soft nontender and nondistended  Peripheral Vascular: No peripheral edema  Mental Status/Psych: Alert and Oriented x 3. Normal mood and affect    Penis: Not done  Scrotal Skin: Not done  Testicles: Not done  Epididymis: Not done  Digital Rectal Exam:     Cystoscopy: I performed flexible cystoscopy and the stent was removed without  difficulty    Imaging: None    Urinalysis: UA RESULTS:  Recent Labs   Lab Test 01/21/23  2220 03/27/20  2112 08/22/18  1300   COLOR Orange*   < > Yellow   APPEARANCE Slightly Cloudy*   < > Clear   URINEGLC Negative   < > Negative   URINEBILI Negative   < > Negative   URINEKETONE Negative   < > Negative   SG 1.024   < > 1.020   UBLD Large*   < > Negative   URINEPH 6.5   < > 7.0   PROTEIN 30*   < > Negative   UROBILINOGEN  --   --  1.0   NITRITE Negative   < > Negative   LEUKEST Negative   < > Negative   RBCU >182*   < >  --    WBCU 0   < >  --     < > = values in this interval not displayed.       PSA:     Post Void Residual:     Other labs: None today      IMPRESSION:  Doing well, stent removed    PLAN:  We discussed prevention methods for future stones.  He will follow-up with me as needed in the future.        Parvez Salamanca M.D.

## 2023-02-28 NOTE — LETTER
2/28/2023       RE: Billie Aden  [address]  [place] [address]     Dear Colleague,    Thank you for referring your patient, Billie Aden, to the [facility] [place] at [facility]. Please see a copy of my visit note below.    Office Visit Note  [facility]  [phone]    UROLOGIC DIAGNOSES:   Right ureteral stone    CURRENT INTERVENTIONS:   S/P extraction    HISTORY:   Billie underwent ureteroscopy with stone extraction in the operating room last week.  He has felt well since the procedure.  His stones are composed of calcium oxalate.  He is here today for stent removal.      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 Right ureteral stent placement;  Surgeon: Parvez Salamanca MD;  Location:  OR     REPAIR MOHS Left 1/25/2018    Procedure: REPAIR MOHS;  Left Lower Eyelid Mohs Reconstruction;  Surgeon: Claudia Melvin MD;  [facility]       FAMILY HISTORY:   Family History   Problem Relation Age of Onset     Cancer - colorectal Mother 79     Cardiovascular Father 70        Had a carotid  endarterectomy at age 70 years.  Tobacco user.     Heart Surgery Brother 66        Alive. Had CABG at age 66 years. Smoker     Diabetes Type 2  Brother      Melanoma Brother        SOCIAL HISTORY:   Social History     Socioeconomic History     Marital status:      Number of children: 2   Occupational History     Occupation: [employer]   Tobacco Use     Smoking status: Never     Smokeless tobacco: Never   Vaping Use     Vaping Use: Never used   Substance and Sexual Activity     Alcohol use: Yes     Comment: occasionally     Drug use: No     Sexual activity: Not Currently     Partners: Female   Other Topics Concern     Parent/sibling w/ CABG, MI or angioplasty before 65F 55M? No   Social History Narrative    , 2 children    Self Employed - semi-retired       Review Of Systems:  Skin: No rash, pruritis, or skin pigmentation  Eyes: No changes in vision  Ears/Nose/Throat: No changes in hearing, no nosebleeds  Respiratory: No shortness of breath, dyspnea on exertion, cough, or hemoptysis  Cardiovascular: No chest pain or palpitations  Gastrointestinal: No diarrhea or constipation. No abdominal pain. No hematochezia  Genitourinary: see HPI  Musculoskeletal: No pain or swelling of joints, normal range of motion  Neurologic: No weakness or tremors  Psychiatric: No recent changes in memory or mood  Hematologic/Lymphatic/Immunologic: No easy bruising or enlarged lymph nodes  Endocrine: No weight gain or loss      PHYSICAL EXAM:    There were no vitals taken for this visit.    Constitutional: Well developed. Conversant and in no acute distress  Eyes: Anicteric sclera, conjunctiva clear, normal extraocular movements  ENT: Normocephalic and atraumatic,   Skin: Warm and dry. No rashes or lesions  Cardiac: No peripheral edema  Back/Flank: Not done  CNS/PNS: Normal musculature and movements, moves all extremities normally  Respiratory: Normal non-labored breathing  Abdomen: Soft nontender and  nondistended  Peripheral Vascular: No peripheral edema  Mental Status/Psych: Alert and Oriented x 3. Normal mood and affect    Penis: Not done  Scrotal Skin: Not done  Testicles: Not done  Epididymis: Not done  Digital Rectal Exam:     Cystoscopy: I performed flexible cystoscopy and the stent was removed without difficulty    Imaging: None    Urinalysis: UA RESULTS:  Recent Labs   Lab Test 01/21/23  2220 03/27/20  2112 08/22/18  1300   COLOR Orange*   < > Yellow   APPEARANCE Slightly Cloudy*   < > Clear   URINEGLC Negative   < > Negative   URINEBILI Negative   < > Negative   URINEKETONE Negative   < > Negative   SG 1.024   < > 1.020   UBLD Large*   < > Negative   URINEPH 6.5   < > 7.0   PROTEIN 30*   < > Negative   UROBILINOGEN  --   --  1.0   NITRITE Negative   < > Negative   LEUKEST Negative   < > Negative   RBCU >182*   < >  --    WBCU 0   < >  --     < > = values in this interval not displayed.       PSA:     Post Void Residual:     Other labs: None today      IMPRESSION:  Doing well, stent removed    PLAN:  We discussed prevention methods for future stones.  He will follow-up with me as needed in the future.        Parvez Salamanca M.D.

## 2023-02-28 NOTE — PATIENT INSTRUCTIONS

## 2023-02-28 NOTE — NURSING NOTE
Chief Complaint   Patient presents with     Kidney Stone Related     In office stent removal   Prior to the start of the procedure and with procedural staff participation, I verbally confirmed the patient s identity using two indicators, relevant allergies, that the procedure was appropriate and matched the consent or emergent situation, and that the correct equipment/implants were available. Immediately prior to starting the procedure I conducted the Time Out with the procedural staff and re-confirmed the patient s name, procedure, and site/side. I have wiped the patient off with the povidone-Iodine solution, draped them,  used Lidocaine hydrochloride jelly, and instilled sterile water into the bladder. (The Joint Commission universal protocol was followed.)  Yes    Sedation (Moderate or Deep): None  5mL 2% lidocaine hydrochloride Urojet instilled into urethra.    NDC# [address]-8  Lot #: [id number]  Expiration Date:  8-24  Pippa Kennedy LPN

## 2023-03-11 DIAGNOSIS — N20.0 CALCULUS OF KIDNEY: ICD-10-CM

## 2023-03-13 RX ORDER — TAMSULOSIN HYDROCHLORIDE 0.4 MG/1
CAPSULE ORAL
Qty: 21 CAPSULE | Refills: 0 | OUTPATIENT
Start: 2023-03-13

## 2023-03-21 NOTE — PROCEDURE: COUNSELING
Lexiscan nuclear stress test completed.   Cece Ricardo, RN
Detail Level: Generalized
Detail Level: Zone
Detail Level: Detailed
Detail Level: Simple

## 2023-07-28 DIAGNOSIS — E78.5 HYPERLIPIDEMIA LDL GOAL <100: ICD-10-CM

## 2023-07-28 DIAGNOSIS — I10 ESSENTIAL HYPERTENSION, BENIGN: ICD-10-CM

## 2023-07-28 RX ORDER — AMLODIPINE BESYLATE 5 MG/1
TABLET ORAL
Qty: 90 TABLET | Refills: 3 | Status: SHIPPED | OUTPATIENT
Start: 2023-07-28 | End: 2024-01-11

## 2023-07-28 RX ORDER — ATORVASTATIN CALCIUM 40 MG/1
TABLET, FILM COATED ORAL
Qty: 90 TABLET | Refills: 3 | Status: SHIPPED | OUTPATIENT
Start: 2023-07-28 | End: 2024-01-11

## 2023-10-06 ENCOUNTER — TRANSFERRED RECORDS (OUTPATIENT)
Dept: HEALTH INFORMATION MANAGEMENT | Facility: CLINIC | Age: 68
End: 2023-10-06
Payer: COMMERCIAL

## 2023-10-11 NOTE — PROGRESS NOTES
Chief Complaint:     URI with cough    HPI:   Patient Billie Aden is a very pleasant 62 year old male with history of recent URI symptoms who presents to Internal Medicine clinic today for follow up of recent URI with bronchospasms and cough symptoms. Regarding the patient's current symptoms, the patient still complains of persistent dry coughing spells with nasal congestion and post nasal drip. Patient denies any known previous diagnosis of COPD or asthma. Patient does not smoke tobacco. Patient denies any acute chest pain, headaches, fever or chills.    Current Medications:     Current Outpatient Prescriptions   Medication Sig Dispense Refill     albuterol (PROAIR HFA/PROVENTIL HFA/VENTOLIN HFA) 108 (90 Base) MCG/ACT Inhaler Inhale 2 puffs into the lungs every 6 hours as needed for shortness of breath / dyspnea or wheezing 1 Inhaler 3     aspirin 81 MG tablet Take 1 tablet (81 mg) by mouth daily 90 tablet 3     atorvastatin (LIPITOR) 20 MG tablet Take 1 tablet (20 mg) by mouth daily 90 tablet 3     azithromycin (ZITHROMAX) 250 MG tablet Two tablets first day, then one tablet daily for four days. 6 tablet 2     guaiFENesin-codeine (ROBITUSSIN AC) 100-10 MG/5ML SOLN solution Take 5-10 mLs by mouth nightly as needed for cough 120 mL 0     predniSONE (DELTASONE) 20 MG tablet Take 3 tabs (60 mg) by mouth daily x 3 days, 2 tabs (40 mg) daily x 3 days, 1 tab (20 mg) daily x 3 days, then 1/2 tab (10 mg) x 3 days. 20 tablet 1     sildenafil (REVATIO/VIAGRA) 20 MG tablet Take 1-2 tablets (20-40 mg) by mouth daily as needed for erectile dysfunction Take 30 min to 4 hours before intercourse.  Never use with nitroglycerin, terazosin or doxazosin 30 tablet 3         Allergies:      Allergies   Allergen Reactions     Dilaudid [Hydromorphone]      Agitation, did not help with pain     Morphine Anxiety            Past Medical History:     Past Medical History:   Diagnosis Date     BCC (basal cell carcinoma of skin) 11/2017     I can order PT for the patient.    Please have Edgard reach out to the patient to help get her scheduled  Thanks  DAVID   left lower lid     Cholelithiasis 10/8/2013     Headache(784.0) 1977     Pelvic fracture (H) 11/2014    occured while horseback riding     Pre-hypertension      Renal disease     kidney stone         Past Surgical History:     Past Surgical History:   Procedure Laterality Date     CHOLECYSTECTOMY       HC CLOSED TX SHOULDER DISLOC W MANIP NO ANESTH       LAPAROSCOPIC CHOLECYSTECTOMY N/A 12/23/2014    Procedure: LAPAROSCOPIC CHOLECYSTECTOMY;  Surgeon: Avelino Bryson MD;  [facility]     REPAIR MOHS Left 1/25/2018    Procedure: REPAIR MOHS;  Left Lower Eyelid Mohs Reconstruction;  Surgeon: Claudia Melvin MD;  [facility]         Family Medical History:     Family History   Problem Relation Age of Onset     Cancer - colorectal Mother 79     Cardiovascular Father      carotid stenosis     HEART DISEASE Brother 66         Social History:     Social History     Social History     Marital status:      Spouse name: N/A     Number of children: N/A     Years of education: N/A     Occupational History     Not on file.     Social History Main Topics     Smoking status: Never Smoker     Smokeless tobacco: Never Used     Alcohol use 0.0 oz/week     0 Standard drinks or equivalent per week      Comment: occasionally     Drug use: No     Sexual activity: Not Currently     Other Topics Concern     Not on file     Social History Narrative    , 2 children    Self Employed - semi-retired           Review of System:     Constitutional: Negative for fever or chills  Skin: Negative for rashes  Ears/Nose/Throat: positive for nasal congestion, post nasal drip  Respiratory: Positive for cough  Cardiovascular: Negative for chest pain  Gastrointestinal: Negative for nausea, vomiting  Genitourinary: Negative for dysuria, hematuria  Musculoskeletal: Negative for myalgias  Neurologic: Negative for headaches  Psychiatric: Negative for depression, anxiety  Hematologic/Lymphatic/Immunologic: Negative  Endocrine:  "Negative  Behavioral: Negative for tobacco use       Physical Exam:   BP (!) 150/94 (BP Location: Right arm, Cuff Size: Adult Large)  Pulse 89  Temp 98.1  F (36.7  C) (Oral)  Ht 5' 8\" (1.727 m)  Wt 219 lb 4.8 oz (99.5 kg)  SpO2 95%  BMI 33.34 kg/m2    GENERAL: alert and no distress  EYES: eyes grossly normal to inspection, and conjunctivae and sclerae normal  HENT: Normocephalic atraumatic. Nose and mouth without ulcers or lesions. Nasal congestion present, post nasal drip present  NECK: supple  RESP: Intermittent dry coughing spells present  CV: regular rate and rhythm, normal S1 S2  LYMPH: no peripheral edema   ABDOMEN: nondistended  MS: no gross musculoskeletal defects noted  SKIN: no suspicious lesions or rashes  NEURO: Alert & Oriented x 3.   PSYCH: mentation appears normal, affect normal      ASSESSMENT/PLAN:       (J06.9) Upper respiratory tract infection, unspecified type  (primary encounter diagnosis)  (J98.01) Bronchospasm  (R05) Cough  Comment: recent new URI symptoms with bronchospasms and coughing spells  Plan: I have prescribed azithromycin (ZITHROMAX) 250 MG tablet, predniSONE (DELTASONE) 20 MG tablet, albuterol (PROAIR HFA/PROVENTIL HFA/VENTOLIN HFA) 108 (90 Base) MCG/ACT Inhaler for treatment.       Follow Up Plan:     Patient is instructed to return to Internal Medicine clinic for follow-up visit in 1 week.        Alida Denis MD  Internal Medicine  [facility]    "

## 2023-10-16 ENCOUNTER — PATIENT OUTREACH (OUTPATIENT)
Dept: CARE COORDINATION | Facility: CLINIC | Age: 68
End: 2023-10-16
Payer: COMMERCIAL

## 2023-10-30 ENCOUNTER — PATIENT OUTREACH (OUTPATIENT)
Dept: CARE COORDINATION | Facility: CLINIC | Age: 68
End: 2023-10-30
Payer: COMMERCIAL

## 2023-11-03 ENCOUNTER — APPOINTMENT (OUTPATIENT)
Dept: URBAN - METROPOLITAN AREA CLINIC 256 | Age: 68
Setting detail: DERMATOLOGY
End: 2023-11-04

## 2023-11-03 VITALS — HEIGHT: 69 IN | WEIGHT: 215 LBS

## 2023-11-03 DIAGNOSIS — D49.2 NEOPLASM OF UNSPECIFIED BEHAVIOR OF BONE, SOFT TISSUE, AND SKIN: ICD-10-CM

## 2023-11-03 DIAGNOSIS — L57.0 ACTINIC KERATOSIS: ICD-10-CM

## 2023-11-03 PROCEDURE — OTHER MIPS QUALITY: OTHER

## 2023-11-03 PROCEDURE — 11102 TANGNTL BX SKIN SINGLE LES: CPT

## 2023-11-03 PROCEDURE — OTHER ADDITIONAL NOTES: OTHER

## 2023-11-03 PROCEDURE — OTHER BIOPSY BY SHAVE METHOD: OTHER

## 2023-11-03 PROCEDURE — OTHER PHOTO-DOCUMENTATION: OTHER

## 2023-11-03 PROCEDURE — OTHER DEFER: OTHER

## 2023-11-03 PROCEDURE — 99213 OFFICE O/P EST LOW 20 MIN: CPT | Mod: 25

## 2023-11-03 PROCEDURE — OTHER COUNSELING: OTHER

## 2023-11-03 PROCEDURE — OTHER EDUCATIONAL RESOURCES PROVIDED: OTHER

## 2023-11-03 ASSESSMENT — LOCATION SIMPLE DESCRIPTION DERM
LOCATION SIMPLE: INFERIOR FOREHEAD
LOCATION SIMPLE: RIGHT FOREHEAD

## 2023-11-03 ASSESSMENT — LOCATION ZONE DERM: LOCATION ZONE: FACE

## 2023-11-03 ASSESSMENT — LOCATION DETAILED DESCRIPTION DERM
LOCATION DETAILED: RIGHT SUPERIOR FOREHEAD
LOCATION DETAILED: INFERIOR MID FOREHEAD

## 2023-11-03 NOTE — PROCEDURE: DEFER
Size Of Lesion In Cm (Optional): 0
Introduction Text (Please End With A Colon): The following procedure was deferred:
Procedure To Be Performed At Next Visit: Cryotherapy
Reason To Defer Override: waiting until Bx treatment is completed before treating his actinic keratoses
Detail Level: Detailed

## 2023-12-19 ENCOUNTER — APPOINTMENT (OUTPATIENT)
Dept: URBAN - METROPOLITAN AREA CLINIC 255 | Age: 68
Setting detail: DERMATOLOGY
End: 2023-12-25

## 2023-12-19 DIAGNOSIS — L57.0 ACTINIC KERATOSIS: ICD-10-CM

## 2023-12-19 PROBLEM — C44.319 BASAL CELL CARCINOMA OF SKIN OF OTHER PARTS OF FACE: Status: ACTIVE | Noted: 2023-12-19

## 2023-12-19 PROCEDURE — 13132 CMPLX RPR F/C/C/M/N/AX/G/H/F: CPT | Mod: 59

## 2023-12-19 PROCEDURE — OTHER MIPS QUALITY: OTHER

## 2023-12-19 PROCEDURE — 17000 DESTRUCT PREMALG LESION: CPT

## 2023-12-19 PROCEDURE — OTHER MOHS SURGERY: OTHER

## 2023-12-19 PROCEDURE — 17003 DESTRUCT PREMALG LES 2-14: CPT

## 2023-12-19 PROCEDURE — 17312 MOHS ADDL STAGE: CPT

## 2023-12-19 PROCEDURE — OTHER LIQUID NITROGEN: OTHER

## 2023-12-19 PROCEDURE — OTHER COUNSELING: OTHER

## 2023-12-19 PROCEDURE — 17311 MOHS 1 STAGE H/N/HF/G: CPT

## 2023-12-19 ASSESSMENT — LOCATION DETAILED DESCRIPTION DERM
LOCATION DETAILED: LEFT CENTRAL ZYGOMA
LOCATION DETAILED: RIGHT SUPERIOR LATERAL FOREHEAD
LOCATION DETAILED: LEFT INFERIOR FOREHEAD
LOCATION DETAILED: RIGHT SUPERIOR FOREHEAD
LOCATION DETAILED: LEFT CENTRAL MALAR CHEEK

## 2023-12-19 ASSESSMENT — LOCATION SIMPLE DESCRIPTION DERM
LOCATION SIMPLE: LEFT CHEEK
LOCATION SIMPLE: LEFT FOREHEAD
LOCATION SIMPLE: RIGHT FOREHEAD
LOCATION SIMPLE: LEFT ZYGOMA

## 2023-12-19 ASSESSMENT — LOCATION ZONE DERM: LOCATION ZONE: FACE

## 2023-12-19 NOTE — PROCEDURE: LIQUID NITROGEN
Post-Care Instructions: I reviewed with the patient in detail post-care instructions. Patient is to wear sunprotection, and avoid picking at any of the treated lesions. Pt may apply Vaseline to crusted or scabbing areas.
Duration Of Freeze Thaw-Cycle (Seconds): 10
Show Applicator Variable?: Yes
Detail Level: Detailed
Render Post-Care Instructions In Note?: no
Number Of Freeze-Thaw Cycles: 1 freeze-thaw cycle
Consent: The patient's consent was obtained including but not limited to risks of crusting, scabbing, blistering, scarring, darker or lighter pigmentary change, recurrence, incomplete removal and infection.
Application Tool (Optional): Cry-AC

## 2023-12-19 NOTE — PROCEDURE: MIPS QUALITY
Quality 130: Documentation Of Current Medications In The Medical Record: Current Medications Documented
Quality 431: Preventive Care And Screening: Unhealthy Alcohol Use - Screening: Patient not identified as an unhealthy alcohol user when screened for unhealthy alcohol use using a systematic screening method
Quality 110: Preventive Care And Screening: Influenza Immunization: Influenza Immunization not Administered because Patient Refused.
Quality 226: Preventive Care And Screening: Tobacco Use: Screening And Cessation Intervention: Patient screened for tobacco use and is an ex/non-smoker
Detail Level: Detailed
Quality 111:Pneumonia Vaccination Status For Older Adults: Patient received any pneumococcal conjugate or polysaccharide vaccine on or after their 60th birthday and before the end of the measurement period
Quality 143: Oncology: Medical And Radiation- Pain Intensity Quantified: Pain severity quantified, no pain present

## 2023-12-19 NOTE — PROCEDURE: MOHS SURGERY
Body Location Override (Optional - Billing Will Still Be Based On Selected Body Map Location If Applicable): Right Superior Forehead
Mohs Case Number: [id number]
Referring Physician (Optional): Miles Sellers III, MD
Consent Type: Consent 2
Eye Shield Used: No
Initial Size Of Lesion: 1.2
X Size Of Lesion In Cm (Optional): 0.8
Number Of Stages: 2
Primary Defect Length In Cm (Final Defect Size - Required For Flaps/Grafts): 1.6
Primary Defect Width In Cm (Final Defect Size - Required For Flaps/Grafts): 1.5
Repair Type: Complex Repair
Which Eyelid Repair Cpt Are You Using?: 35524
Oculoplastic Surgeon Procedure Text (A): After obtaining clear surgical margins the patient was sent to oculoplastics for surgical repair.  The patient understands they will receive post-surgical care and follow-up from the referring physician's office.
Otolaryngologist Procedure Text (A): After obtaining clear surgical margins the patient was sent to otolaryngology for surgical repair.  The patient understands they will receive post-surgical care and follow-up from the referring physician's office.
Plastic Surgeon Procedure Text (A): After obtaining clear surgical margins the patient was sent to plastics for surgical repair.  The patient understands they will receive post-surgical care and follow-up from the referring physician's office.
Mid-Level Procedure Text (A): After obtaining clear surgical margins the patient was sent to a mid-level provider for surgical repair.  The patient understands they will receive post-surgical care and follow-up from the mid-level provider.
Provider Procedure Text (A): After obtaining clear surgical margins the defect was repaired by another provider.
Asc Procedure Text (A): After obtaining clear surgical margins the patient was sent to an ASC for surgical repair.  The patient understands they will receive post-surgical care and follow-up from the ASC physician.
Simple / Intermediate / Complex Repair - Final Wound Length In Cm: 4.1
Suturegard Retention Suture: 2-0 Nylon
Retention Suture Bite Size: 3 mm
Length To Time In Minutes Device Was In Place: 10
Number Of Hemigard Strips Per Side: 1
Undermining Type: Entire Wound
Complex Requirements: Exposure Of Vital Structure?: Named Neurovascular Structure
Name Of Neurovascular Structure Exposed (Required): Temporal Artery
Debridement Text: The wound edges were debrided prior to proceeding with the closure to facilitate wound healing.
Helical Rim Text: The closure involved the helical rim.
Vermilion Border Text: The closure involved the vermilion border.
Nostril Rim Text: The closure involved the nostril rim.
Retention Suture Text: Retention sutures were placed to support the closure and prevent dehiscence.
Secondary Defect Length In Cm (Required For Flaps): 0
Include Size Of Lesion In Location Indication Statement: Yes
Area H Indication Text: Tumors in this location are included in Area H (eyelids, eyebrows, nose, lips, chin, ear, pre-auricular, post-auricular, temple, genitalia, hands, feet, ankles and areola).  Tissue conservation is critical in these anatomic locations.
Area M Indication Text: Tumors in this location are included in Area M (cheek, forehead, scalp, neck, jawline and pretibial skin).  Mohs surgery is indicated for tumors in these anatomic locations.
Area L Indication Text: Tumors in this location are included in Area L (trunk and extremities).  Mohs surgery is indicated for larger tumors, or tumors with aggressive histologic features, in these anatomic locations.
Special Stains Stage 1 - Results: Base On Clearance Noted Above
Stage 2: Additional Anesthesia Type: 1% lidocaine with epinephrine
Staging Info: By selecting yes to the question above you will include information on AJCC 8 tumor staging in your Mohs note. Information on tumor staging will be automatically added for SCCs on the head and neck. AJCC 8 includes tumor size, tumor depth, perineural involvement and bone invasion.
Tumor Depth: Less than 6mm from granular layer and no invasion beyond the subcutaneous fat
Why Was The Change Made?: Please Select the Appropriate Response
Medical Necessity Statement: Based on my medical judgement, Mohs surgery is the most appropriate treatment for this cancer compared to other treatments.
Alternatives Discussed Intro (Do Not Add Period): I discussed alternative treatments to Mohs surgery and specifically discussed the risks and benefits of
Consent 1/Introductory Paragraph: The rationale for Mohs was explained to the patient and consent was obtained. The risks, benefits and alternatives to therapy were discussed in detail. Specifically, the risks of infection, scarring, bleeding, prolonged wound healing, incomplete removal, allergy to anesthesia, nerve injury and recurrence were addressed. Prior to the procedure, the treatment site was clearly identified and confirmed by the patient. All components of Universal Protocol/PAUSE Rule completed.
Consent 2/Introductory Paragraph: Mohs surgery was explained to the patient and consent was obtained. The risks, benefits and alternatives to therapy were discussed in detail. Specifically, the risks of infection, scarring, bleeding, prolonged wound healing, incomplete removal, allergy to anesthesia, nerve injury and recurrence were addressed. Prior to the procedure, the treatment site was clearly identified and confirmed by the patient. All components of Universal Protocol/PAUSE Rule completed.
Consent 3/Introductory Paragraph: I gave the patient a chance to ask questions they had about the procedure.  Following this I explained the Mohs procedure and consent was obtained. The risks, benefits and alternatives to therapy were discussed in detail. Specifically, the risks of infection, scarring, bleeding, prolonged wound healing, incomplete removal, allergy to anesthesia, nerve injury and recurrence were addressed. Prior to the procedure, the treatment site was clearly identified and confirmed by the patient. All components of Universal Protocol/PAUSE Rule completed.
Consent (Temporal Branch)/Introductory Paragraph: The rationale for Mohs was explained to the patient and consent was obtained. The risks, benefits and alternatives to therapy were discussed in detail. Specifically, the risks of damage to the temporal branch of the facial nerve, infection, scarring, bleeding, prolonged wound healing, incomplete removal, allergy to anesthesia, and recurrence were addressed. Prior to the procedure, the treatment site was clearly identified and confirmed by the patient. All components of Universal Protocol/PAUSE Rule completed.
Consent (Marginal Mandibular)/Introductory Paragraph: The rationale for Mohs was explained to the patient and consent was obtained. The risks, benefits and alternatives to therapy were discussed in detail. Specifically, the risks of damage to the marginal mandibular branch of the facial nerve, infection, scarring, bleeding, prolonged wound healing, incomplete removal, allergy to anesthesia, and recurrence were addressed. Prior to the procedure, the treatment site was clearly identified and confirmed by the patient. All components of Universal Protocol/PAUSE Rule completed.
Consent (Spinal Accessory)/Introductory Paragraph: The rationale for Mohs was explained to the patient and consent was obtained. The risks, benefits and alternatives to therapy were discussed in detail. Specifically, the risks of damage to the spinal accessory nerve, infection, scarring, bleeding, prolonged wound healing, incomplete removal, allergy to anesthesia, and recurrence were addressed. Prior to the procedure, the treatment site was clearly identified and confirmed by the patient. All components of Universal Protocol/PAUSE Rule completed.
Consent (Near Eyelid Margin)/Introductory Paragraph: The rationale for Mohs was explained to the patient and consent was obtained. The risks, benefits and alternatives to therapy were discussed in detail. Specifically, the risks of ectropion or eyelid deformity, infection, scarring, bleeding, prolonged wound healing, incomplete removal, allergy to anesthesia, nerve injury and recurrence were addressed. Prior to the procedure, the treatment site was clearly identified and confirmed by the patient. All components of Universal Protocol/PAUSE Rule completed.
Consent (Ear)/Introductory Paragraph: The rationale for Mohs was explained to the patient and consent was obtained. The risks, benefits and alternatives to therapy were discussed in detail. Specifically, the risks of ear deformity, infection, scarring, bleeding, prolonged wound healing, incomplete removal, allergy to anesthesia, nerve injury and recurrence were addressed. Prior to the procedure, the treatment site was clearly identified and confirmed by the patient. All components of Universal Protocol/PAUSE Rule completed.
Consent (Nose)/Introductory Paragraph: The rationale for Mohs was explained to the patient and consent was obtained. The risks, benefits and alternatives to therapy were discussed in detail. Specifically, the risks of nasal deformity, changes in the flow of air through the nose, infection, scarring, bleeding, prolonged wound healing, incomplete removal, allergy to anesthesia, nerve injury and recurrence were addressed. Prior to the procedure, the treatment site was clearly identified and confirmed by the patient. All components of Universal Protocol/PAUSE Rule completed.
Consent (Lip)/Introductory Paragraph: The rationale for Mohs was explained to the patient and consent was obtained. The risks, benefits and alternatives to therapy were discussed in detail. Specifically, the risks of lip deformity, changes in the oral aperture, infection, scarring, bleeding, prolonged wound healing, incomplete removal, allergy to anesthesia, nerve injury and recurrence were addressed. Prior to the procedure, the treatment site was clearly identified and confirmed by the patient. All components of Universal Protocol/PAUSE Rule completed.
Consent (Scalp)/Introductory Paragraph: The rationale for Mohs was explained to the patient and consent was obtained. The risks, benefits and alternatives to therapy were discussed in detail. Specifically, the risks of changes in hair growth pattern secondary to repair, infection, scarring, bleeding, prolonged wound healing, incomplete removal, allergy to anesthesia, nerve injury and recurrence were addressed. Prior to the procedure, the treatment site was clearly identified and confirmed by the patient. All components of Universal Protocol/PAUSE Rule completed.
Detail Level: Detailed
Postop Diagnosis: same
Surgeon: Deidre Kennedy MPhil, MD
Anesthesia Type: 1% lidocaine with epinephrine and a 1:10 solution of 8.4% sodium bicarbonate
Anesthesia Volume In Cc: 4
Hemostasis: Electrofulguration
Estimated Blood Loss (Cc): minimal
Anesthesia Volume In Cc: 6
Brow Lift Text: A midfrontal incision was made medially to the defect to allow access to the tissues just superior to the left eyebrow. Following careful dissection inferiorly in a supraperiosteal plane to the level of the left eyebrow, several 3-0 monocryl sutures were used to resuspend the eyebrow orbicularis oculi muscular unit to the superior frontal bone periosteum. This resulted in an appropriate reapproximation of static eyebrow symmetry and correction of the left brow ptosis.
Deep Sutures: 5-0 PGLC
Epidermal Sutures: 6-0 Polypropylene
Epidermal Closure: running and interrupted
Suturegard Intro: Intraoperative tissue expansion was performed, utilizing the SUTUREGARD device, in order to reduce wound tension.
Suturegard Body: The suture ends were repeatedly re-tightened and re-clamped to achieve the desired tissue expansion.
Hemigard Intro: Due to skin fragility and wound tension, it was decided to use HEMIGARD adhesive retention suture devices to permit a linear closure. The skin was cleaned and dried for a 6cm distance away from the wound. Excessive hair, if present, was removed to allow for adhesion.
Hemigard Postcare Instructions: The HEMIGARD strips are to remain completely dry for at least 5-7 days.
Donor Site Anesthesia Type: same as repair anesthesia
Epidermal Closure Graft Donor Site (Optional): simple interrupted
Graft Donor Site Bandage (Optional-Leave Blank If You Don't Want In Note): Steri-strips and a pressure bandage were applied to the donor site.
Closure 2 Information: This tab is for additional flaps and grafts, including complex repair and grafts and complex repair and flaps. You can also specify a different location for the additional defect, if the location is the same you do not need to select a new one. We will insert the automated text for the repair you select below just as we do for solitary flaps and grafts. Please note that at this time if you select a location with a different insurance zone you will need to override the ICD10 and CPT if appropriate.
Closure 3 Information: This tab is for additional flaps and grafts above and beyond our usual structured repairs.  Please note if you enter information here it will not currently bill and you will need to add the billing information manually.
Wound Care: Petrolatum
Dressing: dry sterile dressing
Suture Removal: 7 days
Unna Boot Text: An Unna boot was placed to help immobilize the limb and facilitate more rapid healing.
Home Suture Removal Text: Patient was provided instructions on removing sutures and will remove their sutures at home.  If they have any questions or difficulties they will call the office.
Post-Care Instructions: I reviewed with the patient in detail post-care instructions. Patient is not to engage in any heavy lifting, exercise, or swimming for the next 14 days. Should the patient develop any fevers, chills, bleeding, severe pain patient will contact the office immediately.
Pain Refusal Text: I offered to prescribe pain medication but the patient refused to take this medication.
Mauc Instructions: By selecting yes to the question below the MAUC number will be added into the note.  This will be calculated automatically based on the diagnosis chosen, the size entered, the body zone selected (H,M,L) and the specific indications you chose. You will also have the option to override the Mohs AUC if you disagree with the automatically calculated number and this option is found in the Case Summary tab.
Where Do You Want The Question To Include Opioid Counseling Located?: Case Summary Tab
Eye Protection Verbiage: Before proceeding with the stage, a plastic scleral shield was inserted. The globe was anesthetized with a few drops of 1% lidocaine with 1:100,000 epinephrine. Then, an appropriate sized scleral shield was chosen and coated with lacrilube ointment. The shield was gently inserted and left in place for the duration of each stage. After the stage was completed, the shield was gently removed.
Mohs Method Verbiage: An incision at a 45 degree angle following the standard Mohs approach was done and the specimen was harvested as a microscopic controlled layer.
Surgeon/Pathologist Verbiage (Will Incorporate Name Of Surgeon From Intro If Not Blank): operated in two distinct and integrated capacities as the surgeon and pathologist.
Mohs Histo Method Verbiage: Each section was then chromacoded and processed in the Mohs lab using the Mohs protocol and submitted for frozen section, utilizing hematoxylin and eosin histochemical stains.
Subsequent Stages Histo Method Verbiage: Using a similar technique to that described above, a thin layer of tissue was removed from all areas where tumor was visible on the previous stage.  The tissue was again oriented, mapped, dyed, and processed as above.
Mohs Rapid Report Verbiage: The area of clinically evident tumor was marked with skin marking ink and appropriately hatched.  The initial incision was made following the Mohs approach through the skin.  The specimen was taken to the lab, divided into the necessary number of pieces, chromacoded and processed according to the Mohs protocol.  This was repeated in successive stages until a tumor free defect was achieved.
Complex Repair Preamble Text (Leave Blank If You Do Not Want): Extensive wide and differential undermining were performed.  During reconstruction, hemostasis was performed using electrofulguration.  Initial buried interrupted vertical mattress suture(s) defined redundant cutaneous columns (Burow's triangles) which were removed to the level of the adipose tissue using a #15 blade scalpel and the triangulation technique.  Hemostasis was obtained and the resulting defect was repaired with the same suture material and techniques.  The epidermis was then carefully apposed using polypropylene suture (running).  The wound was stressed in various directions to insure the adequacy of closure and of hemostasis.  The wound edges were pink and well perfused.  Reconstruction was considered complete.
Intermediate Repair Preamble Text (Leave Blank If You Do Not Want): Undermining was performed with blunt dissection.
Crescentic Complex Repair Preamble Text (Leave Blank If You Do Not Want): Extensive wide undermining was performed.
Non-Graft Cartilage Fenestration Text: The cartilage was fenestrated with a 2mm punch biopsy to help facilitate healing.
Graft Cartilage Fenestration Text: The cartilage was fenestrated with a 2mm punch biopsy to help facilitate graft survival and healing.
Secondary Intention Text (Leave Blank If You Do Not Want): The defect will heal with secondary intention.
No Repair - Repaired With Adjacent Surgical Defect Text (Leave Blank If You Do Not Want): After obtaining clear surgical margins the defect was repaired concurrently with another surgical defect which was in close approximation.
Adjacent Tissue Transfer Text: The defect edges were debeveled with a #15 scalpel blade. Given the location of the defect and the proximity to free margins an adjacent tissue transfer was deemed most appropriate. Using a sterile surgical marker, an appropriate flap was drawn incorporating the defect and placing the expected incisions within the relaxed skin tension lines where possible. The area thus outlined was incised deep to adipose tissue with a #15 scalpel blade. The skin margins were undermined to an appropriate distance in all directions utilizing iris scissors and carried over to close the primary defect.
Advancement Flap (Single) Text: In order to preserve normal anatomic and functional relationships, a single advancement flap was deemed the most appropriate reconstructive procedure.  The beveled edges of the Mohs defect were excised with a #15 scalpel blade.    The flap was designed to fall along relaxed skin tension lines and/or free margins, incised, and elevated using blunt curved tissue scissors.  Hemostasis was achieved.  Interrupted buried vertical mattress sutures were employed to carry over and secure the flap in place.  Redundant cutaneous columns defined by the flap's movement were removed to the adipose layer using the triangulation technique and repaired in similar fashion.  Final epidermal approximation along the length of the flap was achieved using epidermal sutures.  The wound was stressed in various directions to ensure the adequacy of the closure and of hemostasis.  The flap edges appeared pink and well perfused.  Reconstruction was considered complete.
Advancement Flap (Double) Text: In order to preserve normal anatomic and functional relationships, a double advancement flap was deemed the most appropriate reconstructive procedure.  The beveled edges of the Mohs defect were excised with a #15 scalpel blade.    The flaps were designed to fall along relaxed skin tension lines and/or free margins, incised, and elevated using blunt curved tissue scissors.  Hemostasis was achieved.  Interrupted buried vertical mattress sutures were employed to carry over and secure the flaps in place.  Redundant cutaneous columns defined by the flaps' movements were removed to the adipose layer using the triangulation technique and repaired in similar fashion.  Final epidermal approximation along the length of the flap was achieved using epidermal sutures.  The wound was stressed in various directions to ensure the adequacy of the closure and of hemostasis.  The flap edges appeared pink and well perfused.  Reconstruction was considered complete.
Burow's Advancement Flap Text: The defect edges were debeveled with a #15 scalpel blade. Given the location of the defect and the proximity to free margins a Burow's advancement flap was deemed most appropriate. Using a sterile surgical marker, the appropriate advancement flap was drawn incorporating the defect and placing the expected incisions within the relaxed skin tension lines where possible. The area thus outlined was incised deep to adipose tissue with a #15 scalpel blade. The skin margins were undermined to an appropriate distance in all directions utilizing iris scissors. Following this, the designed flap was advanced and carried over into the primary defect and sutured into place.
Chonodrocutaneous Helical Advancement Flap Text: The defect edges were debeveled with a #15 scalpel blade. Given the location of the defect and the proximity to free margins a chondrocutaneous helical advancement flap was deemed most appropriate. Using a sterile surgical marker, the appropriate advancement flap was drawn incorporating the defect and placing the expected incisions within the relaxed skin tension lines where possible. The area thus outlined was incised deep to adipose tissue with a #15 scalpel blade. The skin margins were undermined to an appropriate distance in all directions utilizing iris scissors. Following this, the designed flap was advanced and carried over into the primary defect and sutured into place.
Crescentic Advancement Flap Text: The defect edges were debeveled with a #15 scalpel blade. Given the location of the defect and the proximity to free margins a crescentic advancement flap was deemed most appropriate. Using a sterile surgical marker, the appropriate advancement flap was drawn incorporating the defect and placing the expected incisions within the relaxed skin tension lines where possible. The area thus outlined was incised deep to adipose tissue with a #15 scalpel blade. The skin margins were undermined to an appropriate distance in all directions utilizing iris scissors. Following this, the designed flap was advanced and carried over into the primary defect and sutured into place.
A-T Advancement Flap Text: The defect edges were debeveled with a #15 scalpel blade. Given the location of the defect, shape of the defect and the proximity to free margins an A-T advancement flap was deemed most appropriate. Using a sterile surgical marker, an appropriate advancement flap was drawn incorporating the defect and placing the expected incisions within the relaxed skin tension lines where possible. The area thus outlined was incised deep to adipose tissue with a #15 scalpel blade. The skin margins were undermined to an appropriate distance in all directions utilizing iris scissors. Following this, the designed flap was advanced and carried over into the primary defect and sutured into place.
O-T Advancement Flap Text: In order to preserve normal anatomic and functional relationships, an O-T advancement flap was deemed the most appropriate reconstructive procedure.  The beveled edges of the Mohs defect were excised with a #15 scalpel blade.    The flap was designed to fall along relaxed skin tension lines and/or free margins, incised, and elevated using blunt curved tissue scissors.  Hemostasis was achieved.  Interrupted buried vertical mattress sutures were employed to carry over and secure the flap in place.  Redundant cutaneous columns defined by the flap's movement were removed to the adipose layer using the triangulation technique and repaired in similar fashion.  Final epidermal approximation along the length of the flap was achieved using epidermal sutures.  The wound was stressed in various directions to ensure the adequacy of the closure and of hemostasis.  The flap edges appeared pink and well perfused.  Reconstruction was considered complete.
O-L Flap Text: The defect edges were debeveled with a #15 scalpel blade. Given the location of the defect, shape of the defect and the proximity to free margins an O-L flap was deemed most appropriate. Using a sterile surgical marker, an appropriate advancement flap was drawn incorporating the defect and placing the expected incisions within the relaxed skin tension lines where possible. The area thus outlined was incised deep to adipose tissue with a #15 scalpel blade. The skin margins were undermined to an appropriate distance in all directions utilizing iris scissors. Following this, the designed flap was advanced and carried over into the primary defect and sutured into place.
O-Z Flap Text: The defect edges were debeveled with a #15 scalpel blade. Given the location of the defect, shape of the defect and the proximity to free margins an O-Z flap was deemed most appropriate. Using a sterile surgical marker, an appropriate transposition flap was drawn incorporating the defect and placing the expected incisions within the relaxed skin tension lines where possible. The area thus outlined was incised deep to adipose tissue with a #15 scalpel blade. The skin margins were undermined to an appropriate distance in all directions utilizing iris scissors. Following this, the designed flap was carried over into the primary defect and sutured into place.
Double O-Z Flap Text: The defect edges were debeveled with a #15 scalpel blade. Given the location of the defect, shape of the defect and the proximity to free margins a Double O-Z flap was deemed most appropriate. Using a sterile surgical marker, an appropriate transposition flap was drawn incorporating the defect and placing the expected incisions within the relaxed skin tension lines where possible. The area thus outlined was incised deep to adipose tissue with a #15 scalpel blade. The skin margins were undermined to an appropriate distance in all directions utilizing iris scissors. Following this, the designed flap was carried over into the primary defect and sutured into place.
V-Y Flap Text: The defect edges were debeveled with a #15 scalpel blade. Given the location of the defect, shape of the defect and the proximity to free margins a V-Y flap was deemed most appropriate. Using a sterile surgical marker, an appropriate advancement flap was drawn incorporating the defect and placing the expected incisions within the relaxed skin tension lines where possible. The area thus outlined was incised deep to adipose tissue with a #15 scalpel blade. The skin margins were undermined to an appropriate distance in all directions utilizing iris scissors. Following this, the designed flap was advanced and carried over into the primary defect and sutured into place.
Advancement-Rotation Flap Text: The defect edges were debeveled with a #15 scalpel blade. Given the location of the defect, shape of the defect and the proximity to free margins an advancement-rotation flap was deemed most appropriate. Using a sterile surgical marker, an appropriate flap was drawn incorporating the defect and placing the expected incisions within the relaxed skin tension lines where possible. The area thus outlined was incised deep to adipose tissue with a #15 scalpel blade. The skin margins were undermined to an appropriate distance in all directions utilizing iris scissors. Following this, the designed flap was carried over into the primary defect and sutured into place.
Mercedes Flap Text: The defect edges were debeveled with a #15 scalpel blade. Given the location of the defect, shape of the defect and the proximity to free margins a Mercedes flap was deemed most appropriate. Using a sterile surgical marker, an appropriate advancement flap was drawn incorporating the defect and placing the expected incisions within the relaxed skin tension lines where possible. The area thus outlined was incised deep to adipose tissue with a #15 scalpel blade. The skin margins were undermined to an appropriate distance in all directions utilizing iris scissors. Following this, the designed flap was advanced and carried over into the primary defect and sutured into place.
Modified Advancement Flap Text: The defect edges were debeveled with a #15 scalpel blade. Given the location of the defect, shape of the defect and the proximity to free margins a modified advancement flap was deemed most appropriate. Using a sterile surgical marker, an appropriate advancement flap was drawn incorporating the defect and placing the expected incisions within the relaxed skin tension lines where possible. The area thus outlined was incised deep to adipose tissue with a #15 scalpel blade. The skin margins were undermined to an appropriate distance in all directions utilizing iris scissors. Following this, the designed flap was advanced and carried over into the primary defect and sutured into place.
Mucosal Advancement Flap Text: Given the location of the defect, shape of the defect and the proximity to free margins a mucosal advancement flap was deemed most appropriate. Incisions were made with a 15 blade scalpel in the appropriate fashion along the cutaneous vermilion border and the mucosal lip. The remaining actinically damaged mucosal tissue was excised.  The mucosal advancement flap was then elevated to the gingival sulcus with care taken to preserve the neurovascular structures and advanced into the primary defect. Care was taken to ensure that precise realignment of the vermilion border was achieved.
Peng Advancement Flap Text: The defect edges were debeveled with a #15 scalpel blade. Given the location of the defect, shape of the defect and the proximity to free margins a Peng advancement flap was deemed most appropriate. Using a sterile surgical marker, an appropriate advancement flap was drawn incorporating the defect and placing the expected incisions within the relaxed skin tension lines where possible. The area thus outlined was incised deep to adipose tissue with a #15 scalpel blade. The skin margins were undermined to an appropriate distance in all directions utilizing iris scissors. Following this, the designed flap was advanced and carried over into the primary defect and sutured into place.
Hatchet Flap Text: The defect edges were debeveled with a #15 scalpel blade. Given the location of the defect, shape of the defect and the proximity to free margins a hatchet flap was deemed most appropriate. Using a sterile surgical marker, an appropriate hatchet flap was drawn incorporating the defect and placing the expected incisions within the relaxed skin tension lines where possible. The area thus outlined was incised deep to adipose tissue with a #15 scalpel blade. The skin margins were undermined to an appropriate distance in all directions utilizing iris scissors. Following this, the designed flap was carried over into the primary defect and sutured into place.
Rotation Flap Text: The defect edges were debeveled with a #15 scalpel blade. Given the location of the defect, shape of the defect and the proximity to free margins a rotation flap was deemed most appropriate. Using a sterile surgical marker, an appropriate rotation flap was drawn incorporating the defect and placing the expected incisions within the relaxed skin tension lines where possible. The area thus outlined was incised deep to adipose tissue with a #15 scalpel blade. The skin margins were undermined to an appropriate distance in all directions utilizing iris scissors. Following this, the designed flap was carried over into the primary defect and sutured into place.
Bilateral Rotation Flap Text: The defect edges were debeveled with a #15 scalpel blade. Given the location of the defect, shape of the defect and the proximity to free margins a bilateral rotation flap was deemed most appropriate. Using a sterile surgical marker, an appropriate rotation flap was drawn incorporating the defect and placing the expected incisions within the relaxed skin tension lines where possible. The area thus outlined was incised deep to adipose tissue with a #15 scalpel blade. The skin margins were undermined to an appropriate distance in all directions utilizing iris scissors. Following this, the designed flap was carried over into the primary defect and sutured into place.
Spiral Flap Text: The defect edges were debeveled with a #15 scalpel blade. Given the location of the defect, shape of the defect and the proximity to free margins a spiral flap was deemed most appropriate. Using a sterile surgical marker, an appropriate rotation flap was drawn incorporating the defect and placing the expected incisions within the relaxed skin tension lines where possible. The area thus outlined was incised deep to adipose tissue with a #15 scalpel blade. The skin margins were undermined to an appropriate distance in all directions utilizing iris scissors. Following this, the designed flap was carried over into the primary defect and sutured into place.
Staged Advancement Flap Text: The defect edges were debeveled with a #15 scalpel blade. Given the location of the defect, shape of the defect and the proximity to free margins a staged advancement flap was deemed most appropriate. Using a sterile surgical marker, an appropriate advancement flap was drawn incorporating the defect and placing the expected incisions within the relaxed skin tension lines where possible. The area thus outlined was incised deep to adipose tissue with a #15 scalpel blade. The skin margins were undermined to an appropriate distance in all directions utilizing iris scissors. Following this, the designed flap was carried over into the primary defect and sutured into place.
Star Wedge Flap Text: The defect edges were debeveled with a #15 scalpel blade. Given the location of the defect, shape of the defect and the proximity to free margins a star wedge flap was deemed most appropriate. Using a sterile surgical marker, an appropriate rotation flap was drawn incorporating the defect and placing the expected incisions within the relaxed skin tension lines where possible. The area thus outlined was incised deep to adipose tissue with a #15 scalpel blade. The skin margins were undermined to an appropriate distance in all directions utilizing iris scissors. Following this, the designed flap was carried over into the primary defect and sutured into place.
Transposition Flap Text: The defect edges were debeveled with a #15 scalpel blade. Given the location of the defect and the proximity to free margins a transposition flap was deemed most appropriate. Using a sterile surgical marker, an appropriate transposition flap was drawn incorporating the defect. The area thus outlined was incised deep to adipose tissue with a #15 scalpel blade. The skin margins were undermined to an appropriate distance in all directions utilizing iris scissors. Following this, the designed flap was carried over into the primary defect and sutured into place.
Muscle Hinge Flap Text: The defect edges were debeveled with a #15 scalpel blade.  Given the size, depth and location of the defect and the proximity to free margins a muscle hinge flap was deemed most appropriate. Using a sterile surgical marker, an appropriate hinge flap was drawn incorporating the defect. The area thus outlined was incised with a #15 scalpel blade. The skin margins were undermined to an appropriate distance in all directions utilizing iris scissors. Following this, the designed flap was carried into the primary defect and sutured into place.
Mustarde Flap Text: The defect edges were debeveled with a #15 scalpel blade.  Given the size, depth and location of the defect and the proximity to free margins a Mustarde flap was deemed most appropriate. Using a sterile surgical marker, an appropriate flap was drawn incorporating the defect. The area thus outlined was incised with a #15 scalpel blade. The skin margins were undermined to an appropriate distance in all directions utilizing iris scissors. Following this, the designed flap was carried into the primary defect and sutured into place.
Nasal Turnover Hinge Flap Text: The defect edges were debeveled with a #15 scalpel blade.  Given the size, depth, location of the defect and the defect being full thickness a nasal turnover hinge flap was deemed most appropriate. Using a sterile surgical marker, an appropriate hinge flap was drawn incorporating the defect. The area thus outlined was incised with a #15 scalpel blade. The flap was designed to recreate the nasal mucosal lining and the alar rim. The skin margins were undermined to an appropriate distance in all directions utilizing iris scissors. Following this, the designed flap was carried over into the primary defect and sutured into place
Nasalis-Muscle-Based Myocutaneous Island Pedicle Flap Text: Using a #15 blade, an incision was made around the donor flap to the level of the nasalis muscle. Wide lateral undermining was then performed in both the subcutaneous plane above the nasalis muscle, and in a submuscular plane just above periosteum. This allowed the formation of a free nasalis muscle axial pedicle (based on the angular artery) which was still attached to the actual cutaneous flap, increasing its mobility and vascular viability. Hemostasis was obtained with pinpoint electrocoagulation. The flap was mobilized into position and the pivotal anchor points positioned and stabilized with buried interrupted sutures. Subcutaneous and dermal tissues were closed in a multilayered fashion with sutures. Tissue redundancies were excised, and the epidermal edges were apposed without significant tension and sutured with sutures.
Orbicularis Oris Muscle Flap Text: The defect edges were debeveled with a #15 scalpel blade.  Given that the defect affected the competency of the oral sphincter an orbicularis oris muscle flap was deemed most appropriate to restore this competency and normal muscle function.  Using a sterile surgical marker, an appropriate flap was drawn incorporating the defect. The area thus outlined was incised with a #15 scalpel blade. Following this, the designed flap was carried over into the primary defect and sutured into place.
Melolabial Transposition Flap Text: In order to preserve normal anatomic and functional relationships, a melolabial transposition flap was deemed the most appropriate reconstructive procedure.  The beveled edges of the Mohs defect were excised with a #15 scalpel blade.    The flap was designed to fall along relaxed skin tension lines and/or free margins, incised, and elevated using blunt curved tissue scissors.  Hemostasis was achieved.  Interrupted buried vertical mattress sutures were employed to carry over (transpose) and secure the flap in place.  Redundant cutaneous columns defined by the flap's movement were removed to the adipose layer using the triangulation technique and repaired in similar fashion.  Final epidermal approximation along the length of the flap was achieved using epidermal sutures.  The wound was stressed in various directions to ensure the adequacy of the closure and of hemostasis.  The flap edges appeared pink and well perfused.  Reconstruction was considered complete.
Rhombic Flap Text: In order to preserve normal anatomic and functional relationships, a rhombic flap was deemed the most appropriate reconstructive procedure.  The beveled edges of the Mohs defect were excised with a #15 scalpel blade.    The flap was designed to fall along relaxed skin tension lines and/or free margins, incised, and elevated using blunt curved tissue scissors.  Hemostasis was achieved.  Interrupted buried vertical mattress sutures were employed to carry over (transpose) and secure the flap in place.  Redundant cutaneous columns defined by the flap's movement were removed to the adipose layer using the triangulation technique and repaired in similar fashion.  Final epidermal approximation along the length of the flap was achieved using epidermal sutures.  The wound was stressed in various directions to ensure the adequacy of the closure and of hemostasis.  The flap edges appeared pink and well perfused.  Reconstruction was considered complete.
Rhomboid Transposition Flap Text: The defect edges were debeveled with a #15 scalpel blade. Given the location of the defect and the proximity to free margins a rhomboid transposition flap was deemed most appropriate. Using a sterile surgical marker, an appropriate rhomboid flap was drawn incorporating the defect. The area thus outlined was incised deep to adipose tissue with a #15 scalpel blade. The skin margins were undermined to an appropriate distance in all directions utilizing iris scissors. Following this, the designed flap was carried over into the primary defect and sutured into place.
Bi-Rhombic Flap Text: The defect edges were debeveled with a #15 scalpel blade. Given the location of the defect and the proximity to free margins a bi-rhombic flap was deemed most appropriate. Using a sterile surgical marker, an appropriate rhombic flap was drawn incorporating the defect. The area thus outlined was incised deep to adipose tissue with a #15 scalpel blade. The skin margins were undermined to an appropriate distance in all directions utilizing iris scissors. Following this, the designed flap was carried over into the primary defect and sutured into place.
Helical Rim Advancement Flap Text: The defect edges were debeveled with a #15 blade scalpel.  Given the location of the defect and the proximity to free margins (helical rim) a double helical rim advancement flap was deemed most appropriate. Using a sterile surgical marker, the appropriate advancement flaps were drawn incorporating the defect and placing the expected incisions between the helical rim and antihelix where possible.  The area thus outlined was incised through and through with a #15 scalpel blade.  With a skin hook and iris scissors, the flaps were gently and sharply undermined and freed up. Folllowing this, the designed flaps were carried over into the primary defect and sutured into place.
Bilateral Helical Rim Advancement Flap Text: The defect edges were debeveled with a #15 blade scalpel.  Given the location of the defect and the proximity to free margins (helical rim) a bilateral helical rim advancement flap was deemed most appropriate. Using a sterile surgical marker, the appropriate advancement flaps were drawn incorporating the defect and placing the expected incisions between the helical rim and antihelix where possible.  The area thus outlined was incised through and through with a #15 scalpel blade.  With a skin hook and iris scissors, the flaps were gently and sharply undermined and freed up. Following this, the designed flaps were placed into the primary defect and sutured into place.
Ear Star Wedge Flap Text: The defect edges were debeveled with a #15 blade scalpel.  Given the location of the defect and the proximity to free margins (helical rim) an ear star wedge flap was deemed most appropriate. Using a sterile surgical marker, the appropriate flap was drawn incorporating the defect and placing the expected incisions between the helical rim and antihelix where possible.  The area thus outlined was incised through and through with a #15 scalpel blade. Following this, the designed flap was carried over into the primary defect and sutured into place.
Banner Transposition Flap Text: The defect edges were debeveled with a #15 scalpel blade. Given the location of the defect and the proximity to free margins a Banner transposition flap was deemed most appropriate. Using a sterile surgical marker, an appropriate flap was drawn around the defect. The area thus outlined was incised deep to adipose tissue with a #15 scalpel blade. The skin margins were undermined to an appropriate distance in all directions utilizing iris scissors. Following this, the designed flap was carried into the primary defect and sutured into place.
Bilobed Flap Text: The defect edges were debeveled with a #15 scalpel blade. Given the location of the defect and the proximity to free margins a bilobe flap was deemed most appropriate. Using a sterile surgical marker, an appropriate bilobe flap drawn around the defect. The area thus outlined was incised deep to adipose tissue with a #15 scalpel blade. The skin margins were undermined to an appropriate distance in all directions utilizing iris scissors. Following this, the designed flap was carried over into the primary defect and sutured into place.
Bilobed Transposition Flap Text: The defect edges were debeveled with a #15 scalpel blade. Given the location of the defect and the proximity to free margins a bilobed transposition flap was deemed most appropriate. Using a sterile surgical marker, an appropriate bilobe flap drawn around the defect. The area thus outlined was incised deep to adipose tissue with a #15 scalpel blade. The skin margins were undermined to an appropriate distance in all directions utilizing iris scissors. Following this, the designed flap was carried over into the primary defect and sutured into place.
Trilobed Flap Text: The defect edges were debeveled with a #15 scalpel blade. Given the location of the defect and the proximity to free margins a trilobed flap was deemed most appropriate. Using a sterile surgical marker, an appropriate trilobed flap was drawn around the defect. The area thus outlined was incised deep to adipose tissue with a #15 scalpel blade. The skin margins were undermined to an appropriate distance in all directions utilizing iris scissors. Following this, the designed flap was carried into the primary defect and sutured into place.
Dorsal Nasal Flap Text: The defect edges were debeveled with a #15 scalpel blade. Given the location of the defect and the proximity to free margins a dorsal nasal flap was deemed most appropriate. Using a sterile surgical marker, an appropriate dorsal nasal flap was drawn around the defect. The area thus outlined was incised deep to adipose tissue with a #15 scalpel blade. The skin margins were undermined to an appropriate distance in all directions utilizing iris scissors. Following this, the designed flap was carried into the primary defect and sutured into place.
Island Pedicle Flap Text: The defect edges were debeveled with a #15 scalpel blade. Given the location, size, and shape of the defect, an island pedicle advancement flap was deemed the most appropriate reconstructive option. An appropriate advancement flap was created incorporating the defect, outlining the appropriate donor tissue and placing the expected incisions within the relaxed skin tension lines where possible. The area thus outlined was incised deep to adipose tissue with a #15 scalpel blade. The skin margins were undermined to an appropriate distance in all directions around the primary defect and laterally outward around the island pedicle utilizing Ragnell scissors.  An appropriate pedicle of adipose and muscular tissue was created toward the advancing flap edge. The flap was carried over into the primary defect and sutured into place, with the secondary defect closed in V-Y fashion.  The flap was then stressed in various directions to assess the adequacy of closure and of hemostasis.  The flap was pink and well perfused.  Reconstruction was considered complete.
Island Pedicle Flap With Canthal Suspension Text: The defect edges were debeveled with a #15 scalpel blade. Given the location of the defect, shape of the defect and the proximity to free margins an island pedicle advancement flap was deemed most appropriate. Using a sterile surgical marker, an appropriate advancement flap was drawn incorporating the defect, outlining the appropriate donor tissue and placing the expected incisions within the relaxed skin tension lines where possible. The area thus outlined was incised deep to adipose tissue with a #15 scalpel blade. The skin margins were undermined to an appropriate distance in all directions around the primary defect and laterally outward around the island pedicle utilizing iris scissors.  There was minimal undermining beneath the pedicle flap. A suspension suture was placed in the canthal tendon to prevent tension and prevent ectropion. Following this, the designed flap was placed into the primary defect and sutured into place.
Alar Island Pedicle Flap Text: The defect edges were debeveled with a #15 scalpel blade. Given the location of the defect, shape of the defect and the proximity to the alar rim an island pedicle advancement flap was deemed most appropriate. Using a sterile surgical marker, an appropriate advancement flap was drawn incorporating the defect, outlining the appropriate donor tissue and placing the expected incisions within the nasal ala running parallel to the alar rim. The area thus outlined was incised with a #15 scalpel blade. The skin margins were undermined minimally to an appropriate distance in all directions around the primary defect and laterally outward around the island pedicle utilizing iris scissors.  There was minimal undermining beneath the pedicle flap. Following this, the designed flap was carried over into the primary defect and sutured into place.
Double Island Pedicle Flap Text: The defect edges were debeveled with a #15 scalpel blade. Given the location of the defect, shape of the defect and the proximity to free margins a double island pedicle advancement flap was deemed most appropriate. Using a sterile surgical marker, an appropriate advancement flap was drawn incorporating the defect, outlining the appropriate donor tissue and placing the expected incisions within the relaxed skin tension lines where possible. The area thus outlined was incised deep to adipose tissue with a #15 scalpel blade. The skin margins were undermined to an appropriate distance in all directions around the primary defect and laterally outward around the island pedicle utilizing iris scissors.  There was minimal undermining beneath the pedicle flap. Following this, the flap was carried over into the primary defect and sutured into place.
Island Pedicle Flap-Requiring Vessel Identification Text: The defect edges were debeveled with a #15 scalpel blade. Given the location of the defect, shape of the defect and the proximity to free margins an island pedicle advancement flap was deemed most appropriate. Using a sterile surgical marker, an appropriate advancement flap was drawn, based on the axial vessel mentioned above, incorporating the defect, outlining the appropriate donor tissue and placing the expected incisions within the relaxed skin tension lines where possible. The area thus outlined was incised deep to adipose tissue with a #15 scalpel blade. The skin margins were undermined to an appropriate distance in all directions around the primary defect and laterally outward around the island pedicle utilizing iris scissors.  There was minimal undermining beneath the pedicle flap. Following this, the designed flap was carried over into the primary defect and sutured into place.
Keystone Flap Text: The defect edges were debeveled with a #15 scalpel blade. Given the location of the defect, shape of the defect a keystone flap was deemed most appropriate. Using a sterile surgical marker, an appropriate keystone flap was drawn incorporating the defect, outlining the appropriate donor tissue and placing the expected incisions within the relaxed skin tension lines where possible. The area thus outlined was incised deep to adipose tissue with a #15 scalpel blade. The skin margins were undermined to an appropriate distance in all directions around the primary defect and laterally outward around the flap utilizing iris scissors. Following this, the designed flap was carried into the primary defect and sutured into place.
O-T Plasty Text: The defect edges were debeveled with a #15 scalpel blade. Given the location of the defect, shape of the defect and the proximity to free margins an O-T plasty was deemed most appropriate. Using a sterile surgical marker, an appropriate O-T plasty was drawn incorporating the defect and placing the expected incisions within the relaxed skin tension lines where possible. The area thus outlined was incised deep to adipose tissue with a #15 scalpel blade. The skin margins were undermined to an appropriate distance in all directions utilizing iris scissors. Following this, the designed flap was carried over into the primary defect and sutured into place.
O-Z Plasty Text: The defect edges were debeveled with a #15 scalpel blade. Given the location of the defect, shape of the defect and the proximity to free margins an O-Z plasty (double transposition flap) was deemed most appropriate. Using a sterile surgical marker, the appropriate transposition flaps were drawn incorporating the defect and placing the expected incisions within the relaxed skin tension lines where possible. The area thus outlined was incised deep to adipose tissue with a #15 scalpel blade. The skin margins were undermined to an appropriate distance in all directions utilizing iris scissors. Hemostasis was achieved with electrocautery. The flaps were then transposed and carried over into place, one clockwise and the other counterclockwise, and anchored with interrupted buried subcutaneous sutures.
Double O-Z Plasty Text: The defect edges were debeveled with a #15 scalpel blade. Given the location of the defect, shape of the defect and the proximity to free margins a Double O-Z plasty (double transposition flap) was deemed most appropriate. Using a sterile surgical marker, the appropriate transposition flaps were drawn incorporating the defect and placing the expected incisions within the relaxed skin tension lines where possible. The area thus outlined was incised deep to adipose tissue with a #15 scalpel blade. The skin margins were undermined to an appropriate distance in all directions utilizing iris scissors. Hemostasis was achieved with electrocautery. The flaps were then transposed and carried over into place, one clockwise and the other counterclockwise, and anchored with interrupted buried subcutaneous sutures.
V-Y Plasty Text: The defect edges were debeveled with a #15 scalpel blade. Given the location of the defect, shape of the defect and the proximity to free margins an V-Y advancement flap was deemed most appropriate. Using a sterile surgical marker, an appropriate advancement flap was drawn incorporating the defect and placing the expected incisions within the relaxed skin tension lines where possible. The area thus outlined was incised deep to adipose tissue with a #15 scalpel blade. The skin margins were undermined to an appropriate distance in all directions utilizing iris scissors. Following this, the designed flap was advanced and carried over into the primary defect and sutured into place.
H Plasty Text: Given the location of the defect, shape of the defect and the proximity to free margins a H-plasty was deemed most appropriate for repair. Using a sterile surgical marker, the appropriate advancement arms of the H-plasty were drawn incorporating the defect and placing the expected incisions within the relaxed skin tension lines where possible. The area thus outlined was incised deep to adipose tissue with a #15 scalpel blade. The skin margins were undermined to an appropriate distance in all directions utilizing iris scissors.  The opposing advancement arms were then advanced and carried over into place in opposite direction and anchored with interrupted buried subcutaneous sutures.
W Plasty Text: The lesion was extirpated to the level of the fat with a #15 scalpel blade. Given the location of the defect, shape of the defect and the proximity to free margins a W-plasty was deemed most appropriate for repair. Using a sterile surgical marker, the appropriate transposition arms of the W-plasty were drawn incorporating the defect and placing the expected incisions within the relaxed skin tension lines where possible. The area thus outlined was incised deep to adipose tissue with a #15 scalpel blade. The skin margins were undermined to an appropriate distance in all directions utilizing iris scissors. The opposing transposition arms were then transposed and carried over into place in opposite direction and anchored with interrupted buried subcutaneous sutures.
Z Plasty Text: The lesion was extirpated to the level of the fat with a #15 scalpel blade. Given the location of the defect, shape of the defect and the proximity to free margins a Z-plasty was deemed most appropriate for repair. Using a sterile surgical marker, the appropriate transposition arms of the Z-plasty were drawn incorporating the defect and placing the expected incisions within the relaxed skin tension lines where possible. The area thus outlined was incised deep to adipose tissue with a #15 scalpel blade. The skin margins were undermined to an appropriate distance in all directions utilizing iris scissors. The opposing transposition arms were then transposed and carried over into place in opposite direction and anchored with interrupted buried subcutaneous sutures.
Double Z Plasty Text: The lesion was extirpated to the level of the fat with a #15 scalpel blade. Given the location of the defect, shape of the defect and the proximity to free margins a double Z-plasty was deemed most appropriate for repair. Using a sterile surgical marker, the appropriate transposition arms of the double Z-plasty were drawn incorporating the defect and placing the expected incisions within the relaxed skin tension lines where possible. The area thus outlined was incised deep to adipose tissue with a #15 scalpel blade. The skin margins were undermined to an appropriate distance in all directions utilizing iris scissors. The opposing transposition arms were then transposed and carried over into place in opposite direction and anchored with interrupted buried subcutaneous sutures.
Zygomaticofacial Flap Text: Given the location of the defect, shape of the defect and the proximity to free margins a zygomaticofacial flap was deemed most appropriate for repair. Using a sterile surgical marker, the appropriate flap was drawn incorporating the defect and placing the expected incisions within the relaxed skin tension lines where possible. The area thus outlined was incised deep to adipose tissue with a #15 scalpel blade with preservation of a vascular pedicle.  The skin margins were undermined to an appropriate distance in all directions utilizing iris scissors. The flap was then carried over into the defect and anchored with interrupted buried subcutaneous sutures.
Cheek Interpolation Flap Text: A decision was made to reconstruct the defect utilizing an interpolation axial flap and a staged reconstruction.  A telfa template was made of the defect.  This telfa template was then used to outline the Cheek Interpolation flap.  The donor area for the pedicle flap was then injected with anesthesia.  The flap was excised through the skin and subcutaneous tissue down to the layer of the underlying musculature.  The interpolation flap was carefully excised within this deep plane to maintain its blood supply.  The edges of the donor site were undermined.   The donor site was closed in a primary fashion.  The pedicle was then rotated into position and sutured.  Once the tube was sutured into place, adequate blood supply was confirmed with blanching and refill.  The pedicle was then wrapped with xeroform gauze and dressed appropriately with a telfa and gauze bandage to ensure continued blood supply and protect the attached pedicle.
Cheek-To-Nose Interpolation Flap Text: A decision was made to reconstruct the defect utilizing an interpolation axial flap and a staged reconstruction.  A telfa template was made of the defect.  This telfa template was then used to outline the Cheek-To-Nose Interpolation flap.  The donor area for the pedicle flap was then injected with anesthesia.  The flap was excised through the skin and subcutaneous tissue down to the layer of the underlying musculature.  The interpolation flap was carefully excised within this deep plane to maintain its blood supply.  The edges of the donor site were undermined.   The donor site was closed in a primary fashion.  The pedicle was then rotated into position and sutured.  Once the tube was sutured into place, adequate blood supply was confirmed with blanching and refill.  The pedicle was then wrapped with xeroform gauze and dressed appropriately with a telfa and gauze bandage to ensure continued blood supply and protect the attached pedicle.
Interpolation Flap Text: A decision was made to reconstruct the defect utilizing an interpolation axial flap and a staged reconstruction.  A telfa template was made of the defect.  This telfa template was then used to outline the interpolation flap.  The donor area for the pedicle flap was then injected with anesthesia.  The flap was excised through the skin and subcutaneous tissue down to the layer of the underlying musculature.  The interpolation flap was carefully excised within this deep plane to maintain its blood supply.  The edges of the donor site were undermined.   The donor site was closed in a primary fashion.  The pedicle was then rotated into position and sutured.  Once the tube was sutured into place, adequate blood supply was confirmed with blanching and refill.  The pedicle was then wrapped with xeroform gauze and dressed appropriately with a telfa and gauze bandage to ensure continued blood supply and protect the attached pedicle.
Melolabial Interpolation Flap Text: A decision was made to reconstruct the defect utilizing an interpolation axial flap and a staged reconstruction.  A telfa template was made of the defect.  This telfa template was then used to outline the melolabial interpolation flap.  The donor area for the pedicle flap was then injected with anesthesia.  The flap was excised through the skin and subcutaneous tissue down to the layer of the underlying musculature.  The pedicle flap was carefully excised within this deep plane to maintain its blood supply.  The edges of the donor site were undermined.   The donor site was closed in a primary fashion.  The pedicle was then rotated into position and sutured.  Once the tube was sutured into place, adequate blood supply was confirmed with blanching and refill.  The pedicle was then wrapped with xeroform gauze and dressed appropriately with a telfa and gauze bandage to ensure continued blood supply and protect the attached pedicle.
Mastoid Interpolation Flap Text: A decision was made to reconstruct the defect utilizing an interpolation axial flap and a staged reconstruction.  A telfa template was made of the defect.  This telfa template was then used to outline the mastoid interpolation flap.  The donor area for the pedicle flap was then injected with anesthesia.  The flap was excised through the skin and subcutaneous tissue down to the layer of the underlying musculature.  The pedicle flap was carefully excised within this deep plane to maintain its blood supply.  The edges of the donor site were undermined.   The donor site was closed in a primary fashion.  The pedicle was then rotated into position and sutured.  Once the tube was sutured into place, adequate blood supply was confirmed with blanching and refill.  The pedicle was then wrapped with xeroform gauze and dressed appropriately with a telfa and gauze bandage to ensure continued blood supply and protect the attached pedicle.
Posterior Auricular Interpolation Flap Text: A decision was made to reconstruct the defect utilizing an interpolation axial flap and a staged reconstruction.  A telfa template was made of the defect.  This telfa template was then used to outline the posterior auricular interpolation flap.  The donor area for the pedicle flap was then injected with anesthesia.  The flap was excised through the skin and subcutaneous tissue down to the layer of the underlying musculature.  The pedicle flap was carefully excised within this deep plane to maintain its blood supply.  The edges of the donor site were undermined.   The donor site was closed in a primary fashion.  The pedicle was then rotated into position and sutured.  Once the tube was sutured into place, adequate blood supply was confirmed with blanching and refill.  The pedicle was then wrapped with xeroform gauze and dressed appropriately with a telfa and gauze bandage to ensure continued blood supply and protect the attached pedicle.
Paramedian Forehead Flap Text: A decision was made to reconstruct the defect utilizing an interpolation axial flap and a staged reconstruction.  A telfa template was made of the defect.  This telfa template was then used to outline the paramedian forehead pedicle flap.  The donor area for the pedicle flap was then injected with anesthesia.  The flap was excised through the skin and subcutaneous tissue down to the layer of the underlying musculature.  The pedicle flap was carefully excised within this deep plane to maintain its blood supply.  The edges of the donor site were undermined.   The donor site was closed in a primary fashion.  The pedicle was then rotated into position and sutured.  Once the tube was sutured into place, adequate blood supply was confirmed with blanching and refill.  The pedicle was then wrapped with xeroform gauze and dressed appropriately with a telfa and gauze bandage to ensure continued blood supply and protect the attached pedicle.
Abbe Flap (Upper To Lower Lip) Text: The defect of the lower lip was assessed and measured.  Given the location and size of the defect, an Abbe flap was deemed most appropriate. Using a sterile surgical marker, an appropriate Abbe flap was measured and drawn on the upper lip. Local anesthesia was then infiltrated.  A scalpel was then used to incise the upper lip through and through the skin, vermilion, muscle and mucosa, leaving the flap pedicled on the opposite side.  The flap was then rotated and transferred to the lower lip defect.  The flap was then sutured into place with a three layer technique, closing the orbicularis oris muscle layer with subcutaneous buried sutures, followed by a mucosal layer and an epidermal layer.
Abbe Flap (Lower To Upper Lip) Text: The defect of the upper lip was assessed and measured.  Given the location and size of the defect, an Abbe flap was deemed most appropriate. Using a sterile surgical marker, an appropriate Abbe flap was measured and drawn on the lower lip. Local anesthesia was then infiltrated. A scalpel was then used to incise the upper lip through and through the skin, vermilion, muscle and mucosa, leaving the flap pedicled on the opposite side.  The flap was then rotated and transferred to the lower lip defect.  The flap was then sutured into place with a three layer technique, closing the orbicularis oris muscle layer with subcutaneous buried sutures, followed by a mucosal layer and an epidermal layer.
Estlander Flap (Upper To Lower Lip) Text: The defect of the lower lip was assessed and measured.  Given the location and size of the defect, an Estlander flap was deemed most appropriate. Using a sterile surgical marker, an appropriate Estlander flap was measured and drawn on the upper lip. Local anesthesia was then infiltrated. A scalpel was then used to incise the lateral aspect of the flap, through skin, muscle and mucosa, leaving the flap pedicled medially.  The flap was then rotated and positioned to fill the lower lip defect.  The flap was then sutured into place with a three layer technique, closing the orbicularis oris muscle layer with subcutaneous buried sutures, followed by a mucosal layer and an epidermal layer.
Cheiloplasty (Less Than 50%) Text: A decision was made to reconstruct the defect with a  cheiloplasty.  The defect was undermined extensively.  Additional orbicularis oris muscle was excised with a 15 blade scalpel.  The defect was converted into a full thickness wedge, of less than 50% of the vertical height of the lip, to facilite a better cosmetic result.  Small vessels were then tied off with 5-0 monocyrl. The orbicularis oris, superficial fascia, adipose and dermis were then reapproximated.  After the deeper layers were approximated the epidermis was reapproximated with particular care given to realign the vermilion border.
Cheiloplasty (Complex) Text: A decision was made to reconstruct the defect with a  cheiloplasty.  The defect was undermined extensively.  Additional orbicularis oris muscle was excised with a 15 blade scalpel.  The defect was converted into a full thickness wedge to facilite a better cosmetic result.  Small vessels were then tied off with 5-0 monocyrl. The orbicularis oris, superficial fascia, adipose and dermis were then reapproximated.  After the deeper layers were approximated the epidermis was reapproximated with particular care given to realign the vermilion border.
Ear Wedge Repair Text: A wedge excision was completed by carrying down an excision through the full thickness of the ear and cartilage with an inward facing Burow's triangle. The wound was then closed in a layered fashion.
Full Thickness Lip Wedge Repair (Flap) Text: Given the location of the defect and the proximity to free margins a full thickness wedge repair was deemed most appropriate. Using a sterile surgical marker, the appropriate repair was drawn incorporating the defect and placing the expected incisions perpendicular to the vermilion border.  The vermilion border was also meticulously outlined to ensure appropriate reapproximation during the repair.  The area thus outlined was incised through and through with a #15 scalpel blade.  The muscularis and dermis were reaproximated with deep sutures following hemostasis. Care was taken to realign the vermilion border before proceeding with the superficial closure.  Once the vermilion was realigned the superfical and mucosal closure was finished.
Ftsg Text: The defect edges were debeveled with a #15 scalpel blade. Given the location of the defect, shape of the defect and the proximity to free margins a full thickness skin graft was deemed most appropriate. Using a sterile surgical marker, the primary defect shape was transferred to the donor site. The area thus outlined was incised deep to adipose tissue with a #15 scalpel blade.  The harvested graft was then trimmed of adipose tissue until only dermis and epidermis was left.  The skin margins of the secondary defect were undermined to an appropriate distance in all directions utilizing iris scissors.  The secondary defect was closed with interrupted buried subcutaneous sutures.  The skin edges were then re-apposed with running  sutures.  The skin graft was then placed in the primary defect and oriented appropriately.
Split-Thickness Skin Graft Text: The defect edges were debeveled with a #15 scalpel blade. Given the location of the defect, shape of the defect and the proximity to free margins a split thickness skin graft was deemed most appropriate. Using a sterile surgical marker, the primary defect shape was transferred to the donor site. The split thickness graft was then harvested.  The skin graft was then placed in the primary defect and oriented appropriately.
Pinch Graft Text: The defect edges were debeveled with a #15 scalpel blade. Given the location of the defect, shape of the defect and the proximity to free margins a pinch graft was deemed most appropriate. Using a sterile surgical marker, the primary defect shape was transferred to the donor site. The area thus outlined was incised deep to adipose tissue with a #15 scalpel blade.  The harvested graft was then trimmed of adipose tissue until only dermis and epidermis was left. The skin margins of the secondary defect were undermined to an appropriate distance in all directions utilizing iris scissors.  The secondary defect was closed with interrupted buried subcutaneous sutures.  The skin edges were then re-apposed with running  sutures.  The skin graft was then placed in the primary defect and oriented appropriately.
Burow's Graft Text: The defect edges were debeveled with a #15 scalpel blade. Given the location of the defect, shape of the defect, the proximity to free margins and the presence of a standing cone deformity a Burow's skin graft was deemed most appropriate. The standing cone was removed and this tissue was then trimmed to the shape of the primary defect. The adipose tissue was also removed until only dermis and epidermis were left.  The skin margins of the secondary defect were undermined to an appropriate distance in all directions utilizing iris scissors.  The secondary defect was closed with interrupted buried subcutaneous sutures.  The skin edges were then re-apposed with running  sutures.  The skin graft was then placed in the primary defect and oriented appropriately.
Cartilage Graft Text: The defect edges were debeveled with a #15 scalpel blade. Given the location of the defect, shape of the defect, the fact the defect involved a full thickness cartilage defect a cartilage graft was deemed most appropriate.  An appropriate donor site was identified, cleansed, and anesthetized. The cartilage graft was then harvested and transferred to the recipient site, oriented appropriately and then sutured into place.  The secondary defect was then repaired using a primary closure.
Composite Graft Text: The defect edges were debeveled with a #15 scalpel blade. Given the location of the defect, shape of the defect, the proximity to free margins and the fact the defect was full thickness a composite graft was deemed most appropriate.  The defect was outline and then transferred to the donor site.  A full thickness graft was then excised from the donor site. The graft was then placed in the primary defect, oriented appropriately and then sutured into place.  The secondary defect was then repaired using a primary closure.
Epidermal Autograft Text: The defect edges were debeveled with a #15 scalpel blade. Given the location of the defect, shape of the defect and the proximity to free margins an epidermal autograft was deemed most appropriate. Using a sterile surgical marker, the primary defect shape was transferred to the donor site. The epidermal graft was then harvested.  The skin graft was then placed in the primary defect and oriented appropriately.
Dermal Autograft Text: The defect edges were debeveled with a #15 scalpel blade. Given the location of the defect, shape of the defect and the proximity to free margins a dermal autograft was deemed most appropriate. Using a sterile surgical marker, the primary defect shape was transferred to the donor site. The area thus outlined was incised deep to adipose tissue with a #15 scalpel blade.  The harvested graft was then trimmed of adipose and epidermal tissue until only dermis was left.  The skin graft was then placed in the primary defect and oriented appropriately.
Skin Substitute Text: The defect edges were debeveled with a #15 scalpel blade. Given the location of the defect, shape of the defect and the proximity to free margins a skin substitute graft was deemed most appropriate.  The graft material was trimmed to fit the size of the defect. The graft was then placed in the primary defect and oriented appropriately.
Tissue Cultured Epidermal Autograft Text: The defect edges were debeveled with a #15 scalpel blade. Given the location of the defect, shape of the defect and the proximity to free margins a tissue cultured epidermal autograft was deemed most appropriate.  The graft was then trimmed to fit the size of the defect.  The graft was then placed in the primary defect and oriented appropriately.
Xenograft Text: The defect edges were debeveled with a #15 scalpel blade. Given the location of the defect, shape of the defect and the proximity to free margins a xenograft was deemed most appropriate.  The graft was then trimmed to fit the size of the defect.  The graft was then placed in the primary defect and oriented appropriately.
Purse String (Simple) Text: Given the location of the defect and the characteristics of the surrounding skin a purse string closure was deemed most appropriate.  Undermining was performed circumferentially around the surgical defect.  A purse string suture was then placed and tightened.
Purse String (Intermediate) Text: Given the location of the defect and the characteristics of the surrounding skin a purse string intermediate closure was deemed most appropriate.  Undermining was performed circumferentially around the surgical defect.  A purse string suture was then placed and tightened.
Partial Purse String (Simple) Text: Given the location of the defect and the characteristics of the surrounding skin a simple purse string closure was deemed most appropriate.  Undermining was performed circumferentially around the surgical defect.  A purse string suture was then placed and tightened. Wound tension only allowed a partial closure of the circular defect.
Partial Purse String (Intermediate) Text: Given the location of the defect and the characteristics of the surrounding skin an intermediate purse string closure was deemed most appropriate.  Undermining was performed circumferentially around the surgical defect.  A purse string suture was then placed and tightened. Wound tension only allowed a partial closure of the circular defect.
Localized Dermabrasion With Wire Brush Text: The patient was draped in routine manner.  Localized dermabrasion using 3 x 17 mm wire brush was performed in routine manner to papillary dermis. This spot dermabrasion is being performed to complete skin cancer reconstruction. It also will eliminate the other sun damaged precancerous cells that are known to be part of the regional effect of a lifetime's worth of sun exposure. This localized dermabrasion is therapeutic and should not be considered cosmetic in any regard.
Tarsorrhaphy Text: A tarsorrhaphy was performed using Frost sutures.
Intermediate Repair And Flap Additional Text (Will Appearing After The Standard Complex Repair Text): The intermediate repair was not sufficient to completely close the primary defect. The remaining additional defect was repaired with the flap mentioned below.
Intermediate Repair And Graft Additional Text (Will Appearing After The Standard Complex Repair Text): The intermediate repair was not sufficient to completely close the primary defect. The remaining additional defect was repaired with the graft mentioned below.
Complex Repair And Flap Additional Text (Will Appearing After The Standard Complex Repair Text): The complex repair was not sufficient to completely close the primary defect. The remaining additional defect was repaired with the flap mentioned below.
Complex Repair And Graft Additional Text (Will Appearing After The Standard Complex Repair Text): The complex repair was not sufficient to completely close the primary defect. The remaining additional defect was repaired with the graft mentioned below.
Eyelid Full Thickness Repair - 92515: The eyelid defect was full thickness which required a wedge repair of the eyelid. Special care was taken to ensure that the eyelid margin was realligned when placing sutures.
Manual Repair Warning Statement: We plan on removing the manually selected variable below in favor of our much easier automatic structured text blocks found in the previous tab. We decided to do this to help make the flow better and give you the full power of structured data. Manual selection is never going to be ideal in our platform and I would encourage you to avoid using manual selection from this point on, especially since I will be sunsetting this feature. It is important that you do one of two things with the customized text below. First, you can save all of the text in a word file so you can have it for future reference. Second, transfer the text to the appropriate area in the Library tab. Lastly, if there is a flap or graft type which we do not have you need to let us know right away so I can add it in before the variable is hidden. No need to panic, we plan to give you roughly 6 months to make the change.
Same Histology In Subsequent Stages Text: The pattern and morphology of the tumor is as described in the first stage.
No Residual Tumor Seen Histology Text: There were no malignant cells seen in the sections examined.
Inflammation Suggestive Of Cancer Camouflage Histology Text: There was a dense lymphocytic infiltrate which prevented adequate histologic evaluation of adjacent structures.
Bcc Histology Text: There were numerous aggregates of basaloid cells.
Bcc Infiltrative Histology Text: There were numerous aggregates of basaloid cells demonstrating an infiltrative pattern.
Mart-1 - Positive Histology Text: MART-1 staining demonstrates areas of higher density and clustering of melanocytes with Pagetoid spread upwards within the epidermis. The surgical margins are positive for tumor cells.
Mart-1 - Negative Histology Text: MART-1 staining demonstrates a normal density and pattern of melanocytes along the dermal-epidermal junction. The surgical margins are negative for tumor cells.
Information: Selecting Yes will display possible errors in your note based on the variables you have selected. This validation is only offered as a suggestion for you. PLEASE NOTE THAT THE VALIDATION TEXT WILL BE REMOVED WHEN YOU FINALIZE YOUR NOTE. IF YOU WANT TO FAX A PRELIMINARY NOTE YOU WILL NEED TO TOGGLE THIS TO 'NO' IF YOU DO NOT WANT IT IN YOUR FAXED NOTE.

## 2023-12-27 ENCOUNTER — APPOINTMENT (OUTPATIENT)
Dept: URBAN - METROPOLITAN AREA CLINIC 259 | Age: 68
Setting detail: DERMATOLOGY
End: 2023-12-28

## 2023-12-27 DIAGNOSIS — Z48.02 ENCOUNTER FOR REMOVAL OF SUTURES: ICD-10-CM

## 2023-12-27 PROCEDURE — OTHER DIAGNOSIS COMMENT: OTHER

## 2023-12-27 PROCEDURE — OTHER MIPS QUALITY: OTHER

## 2023-12-27 PROCEDURE — 99024 POSTOP FOLLOW-UP VISIT: CPT

## 2023-12-27 PROCEDURE — OTHER COUNSELING: OTHER

## 2023-12-27 PROCEDURE — OTHER SUTURE REMOVAL (GLOBAL PERIOD): OTHER

## 2023-12-27 PROCEDURE — OTHER RETURN TO REFERRING PROVIDER: OTHER

## 2023-12-27 ASSESSMENT — LOCATION DETAILED DESCRIPTION DERM: LOCATION DETAILED: RIGHT SUPERIOR FOREHEAD

## 2023-12-27 ASSESSMENT — LOCATION SIMPLE DESCRIPTION DERM: LOCATION SIMPLE: RIGHT FOREHEAD

## 2023-12-27 ASSESSMENT — LOCATION ZONE DERM: LOCATION ZONE: FACE

## 2023-12-27 NOTE — PROCEDURE: DIAGNOSIS COMMENT
Comment: S/P [facility]MS of [place]. Infiltrative BCC, Right Superior Forehead, S/P [facility] (12/19/2023)
Render Risk Assessment In Note?: no
Detail Level: Simple

## 2023-12-27 NOTE — PROCEDURE: SUTURE REMOVAL (GLOBAL PERIOD)
Detail Level: Detailed
Add 27053 Cpt? (Important Note: In 2017 The Use Of 57909 Is Being Tracked By Cms To Determine Future Global Period Reimbursement For Global Periods): no

## 2023-12-27 NOTE — PROCEDURE: MIPS QUALITY
Please call and inform patient  We had the Radiologist review and compare to his most recent xrays from [facility] and they are seeing a new small fluid (effusion) in the right lung.  I would recommend that he call his pulmonologist office and inform his pulmonologist that he has new small fluid build up in his right lung as this may need further evaluation.  Please go to ER if with worsening symptoms or concerns of shortness of breath leg swelling.  Jena Koroma M.D.       
Quality 130: Documentation Of Current Medications In The Medical Record: Current Medications Documented
Quality 431: Preventive Care And Screening: Unhealthy Alcohol Use - Screening: Patient not identified as an unhealthy alcohol user when screened for unhealthy alcohol use using a systematic screening method
Quality 110: Preventive Care And Screening: Influenza Immunization: Influenza Immunization not Administered because Patient Refused.
Quality 226: Preventive Care And Screening: Tobacco Use: Screening And Cessation Intervention: Patient screened for tobacco use and is an ex/non-smoker
Detail Level: Detailed
Quality 111:Pneumonia Vaccination Status For Older Adults: Patient received any pneumococcal conjugate or polysaccharide vaccine on or after their 60th birthday and before the end of the measurement period

## 2024-01-04 ENCOUNTER — MYC MEDICAL ADVICE (OUTPATIENT)
Dept: FAMILY MEDICINE | Facility: CLINIC | Age: 69
End: 2024-01-04
Payer: COMMERCIAL

## 2024-01-07 ENCOUNTER — HEALTH MAINTENANCE LETTER (OUTPATIENT)
Age: 69
End: 2024-01-07

## 2024-01-11 ENCOUNTER — OFFICE VISIT (OUTPATIENT)
Dept: INTERNAL MEDICINE | Facility: CLINIC | Age: 69
End: 2024-01-11
Payer: COMMERCIAL

## 2024-01-11 VITALS
WEIGHT: 224.6 LBS | TEMPERATURE: 98.1 F | OXYGEN SATURATION: 98 % | DIASTOLIC BLOOD PRESSURE: 84 MMHG | HEART RATE: 92 BPM | SYSTOLIC BLOOD PRESSURE: 136 MMHG | BODY MASS INDEX: 33.17 KG/M2

## 2024-01-11 DIAGNOSIS — Z86.73 HISTORY OF CVA (CEREBROVASCULAR ACCIDENT): ICD-10-CM

## 2024-01-11 DIAGNOSIS — E80.6 HYPERBILIRUBINEMIA: ICD-10-CM

## 2024-01-11 DIAGNOSIS — Z00.00 MEDICARE ANNUAL WELLNESS VISIT, SUBSEQUENT: Primary | ICD-10-CM

## 2024-01-11 DIAGNOSIS — I10 ESSENTIAL HYPERTENSION: ICD-10-CM

## 2024-01-11 DIAGNOSIS — N18.31 STAGE 3A CHRONIC KIDNEY DISEASE (H): ICD-10-CM

## 2024-01-11 DIAGNOSIS — Z12.5 SCREENING FOR PROSTATE CANCER: ICD-10-CM

## 2024-01-11 PROBLEM — Z86.0100 HISTORY OF COLONIC POLYPS: Status: ACTIVE | Noted: 2018-10-31

## 2024-01-11 PROBLEM — Z85.828 HISTORY OF BASAL CELL CARCINOMA: Status: ACTIVE | Noted: 2024-01-11

## 2024-01-11 PROBLEM — K63.5 POLYP OF COLON: Status: ACTIVE | Noted: 2023-10-06

## 2024-01-11 LAB
ALBUMIN SERPL BCG-MCNC: 4.1 G/DL (ref 3.5–5.2)
ALP SERPL-CCNC: 70 U/L (ref 40–150)
ALT SERPL W P-5'-P-CCNC: 47 U/L (ref 0–70)
ANION GAP SERPL CALCULATED.3IONS-SCNC: 11 MMOL/L (ref 7–15)
AST SERPL W P-5'-P-CCNC: 44 U/L (ref 0–45)
BILIRUB DIRECT SERPL-MCNC: 0.32 MG/DL (ref 0–0.3)
BILIRUB SERPL-MCNC: 1.5 MG/DL
BUN SERPL-MCNC: 14.1 MG/DL (ref 8–23)
CALCIUM SERPL-MCNC: 9.5 MG/DL (ref 8.8–10.2)
CHLORIDE SERPL-SCNC: 101 MMOL/L (ref 98–107)
CHOLEST SERPL-MCNC: 118 MG/DL
CREAT SERPL-MCNC: 1.05 MG/DL (ref 0.67–1.17)
CREAT UR-MCNC: 279 MG/DL
DEPRECATED HCO3 PLAS-SCNC: 27 MMOL/L (ref 22–29)
EGFRCR SERPLBLD CKD-EPI 2021: 77 ML/MIN/1.73M2
FASTING STATUS PATIENT QL REPORTED: YES
GLUCOSE SERPL-MCNC: 130 MG/DL (ref 70–99)
HDLC SERPL-MCNC: 38 MG/DL
HGB BLD-MCNC: 14.9 G/DL (ref 13.3–17.7)
LDLC SERPL CALC-MCNC: 60 MG/DL
MICROALBUMIN UR-MCNC: 20.3 MG/L
MICROALBUMIN/CREAT UR: 7.28 MG/G CR (ref 0–17)
NONHDLC SERPL-MCNC: 80 MG/DL
POTASSIUM SERPL-SCNC: 4.6 MMOL/L (ref 3.4–5.3)
PROT SERPL-MCNC: 7.4 G/DL (ref 6.4–8.3)
PSA SERPL DL<=0.01 NG/ML-MCNC: 2.5 NG/ML (ref 0–4.5)
SODIUM SERPL-SCNC: 139 MMOL/L (ref 135–145)
TRIGL SERPL-MCNC: 100 MG/DL

## 2024-01-11 PROCEDURE — 99214 OFFICE O/P EST MOD 30 MIN: CPT | Mod: 25 | Performed by: INTERNAL MEDICINE

## 2024-01-11 PROCEDURE — 80061 LIPID PANEL: CPT | Performed by: INTERNAL MEDICINE

## 2024-01-11 PROCEDURE — 82570 ASSAY OF URINE CREATININE: CPT | Performed by: INTERNAL MEDICINE

## 2024-01-11 PROCEDURE — 82248 BILIRUBIN DIRECT: CPT | Performed by: INTERNAL MEDICINE

## 2024-01-11 PROCEDURE — 82043 UR ALBUMIN QUANTITATIVE: CPT | Performed by: INTERNAL MEDICINE

## 2024-01-11 PROCEDURE — 80053 COMPREHEN METABOLIC PANEL: CPT | Performed by: INTERNAL MEDICINE

## 2024-01-11 PROCEDURE — G0103 PSA SCREENING: HCPCS | Performed by: INTERNAL MEDICINE

## 2024-01-11 PROCEDURE — 36415 COLL VENOUS BLD VENIPUNCTURE: CPT | Performed by: INTERNAL MEDICINE

## 2024-01-11 PROCEDURE — 85018 HEMOGLOBIN: CPT | Performed by: INTERNAL MEDICINE

## 2024-01-11 PROCEDURE — G0439 PPPS, SUBSEQ VISIT: HCPCS | Performed by: INTERNAL MEDICINE

## 2024-01-11 RX ORDER — AMLODIPINE BESYLATE 5 MG/1
5 TABLET ORAL DAILY
Qty: 90 TABLET | Refills: 4 | Status: SHIPPED | OUTPATIENT
Start: 2024-01-11

## 2024-01-11 RX ORDER — ATORVASTATIN CALCIUM 40 MG/1
40 TABLET, FILM COATED ORAL DAILY
Qty: 90 TABLET | Refills: 4 | Status: SHIPPED | OUTPATIENT
Start: 2024-01-11

## 2024-01-11 RX ORDER — RESPIRATORY SYNCYTIAL VIRUS VACCINE 120MCG/0.5
0.5 KIT INTRAMUSCULAR ONCE
Qty: 1 EACH | Refills: 0 | Status: CANCELLED | OUTPATIENT
Start: 2024-01-11 | End: 2024-01-11

## 2024-01-11 ASSESSMENT — ENCOUNTER SYMPTOMS
ABDOMINAL PAIN: 0
MYALGIAS: 0
PALPITATIONS: 0
NERVOUS/ANXIOUS: 0
WEAKNESS: 0
JOINT SWELLING: 0
EYE PAIN: 0
HEMATURIA: 0
SORE THROAT: 0
COUGH: 1
CHILLS: 0
HEMATOCHEZIA: 0
SHORTNESS OF BREATH: 0
ARTHRALGIAS: 0
HEARTBURN: 0
CONSTIPATION: 0
PARESTHESIAS: 0
DIZZINESS: 0
DIARRHEA: 0
FREQUENCY: 0
HEADACHES: 0
FEVER: 0
DYSURIA: 0

## 2024-01-11 ASSESSMENT — ACTIVITIES OF DAILY LIVING (ADL): CURRENT_FUNCTION: NO ASSISTANCE NEEDED

## 2024-01-11 NOTE — PATIENT INSTRUCTIONS
- I will send you a message on Heysan when I am able to look at the results of your tests from today  - Make an appointment with our pharmacy downstairs or stop by your preferred pharmacy to discuss obtaining the new RSV vaccine, a flu shot, and a COVID booster

## 2024-01-11 NOTE — PROGRESS NOTES
"SUBJECTIVE:   Billie is a 68 year old presenting for the following: Wellness Visit  Are you in the first 12 months of your Medicare coverage?  No  Healthy Habits:     In general, how would you rate your overall health?  Good    Frequency of exercise:  2-3 days/week    Duration of exercise:  30-45 minutes    Do you usually eat at least 4 servings of fruit and vegetables a day, include whole grains    & fiber and avoid regularly eating high fat or \"junk\" foods?  Yes    Taking medications regularly:  Yes    Ability to successfully perform activities of daily living:  No assistance needed    Home Safety:  No safety concerns identified    Hearing Impairment:  No hearing concerns    In the past 6 months, have you been bothered by leaking of urine?  No    In general, how would you rate your overall mental or emotional health?  Good    Additional concerns today:  No    Billie presents today for an AWV. This is the first time I have met Billie. We also discussed his past CVA and liver studies.    Today's PHQ-2 Score:       1/11/2024    10:05 AM   PHQ-2 ( 1999 [employer])   Q1: Little interest or pleasure in doing things 0   Q2: Feeling down, depressed or hopeless 0   PHQ-2 Score 0   Q1: Little interest or pleasure in doing things Not at all   Q2: Feeling down, depressed or hopeless Not at all   PHQ-2 Score 0     Have you ever done Advance Care Planning? (For example, a Health Directive, POLST, or a discussion with a medical provider or your loved ones about your wishes): No, advance care planning information given to patient to review.  Patient plans to discuss their wishes with loved ones or provider.      Fall risk  Fallen 2 or more times in the past year?: No  Any fall with injury in the past year?: No    Cognitive Screening   1) Repeat 3 items (Leader, Season, Table)    2) Clock draw: NORMAL  3) 3 item recall: Recalls 3 objects  Results: 3 items recalled: COGNITIVE IMPAIRMENT LESS LIKELY    Mini-CogTM Copyright S Ann. " Licensed by the author for use in [facility]; reprinted with permission (kevin@[facility]). All rights reserved.      Do you have sleep apnea, excessive snoring or daytime drowsiness? : yes    Reviewed and updated as needed this visit by clinical staff   Tobacco  Allergies  Meds  Problems  Med Hx  Surg Hx  Fam Hx          Reviewed and updated as needed this visit by Provider   Tobacco  Allergies  Meds  Problems  Med Hx  Surg Hx  Fam Hx         Social History     Tobacco Use    Smoking status: Never    Smokeless tobacco: Never   Substance Use Topics    Alcohol use: Yes     Comment: occasionally         1/11/2024    10:05 AM   Alcohol Use   Prescreen: >3 drinks/day or >7 drinks/week? No     Do you have a current opioid prescription? No  Do you use any other controlled substances or medications that are not prescribed by a provider? None    Current providers sharing in care for this patient include:   Patient Care Team:  Jacob Graham MD as PCP - General (Internal Medicine)  Jacob Graham MD as Assigned PCP  Amara Ortez PA-C as Assigned OBGYN Provider  Parvez Salamanca MD as Assigned Surgical Provider    The following health maintenance items are reviewed in Epic and correct as of today:  Health Maintenance   Topic Date Due    RSV VACCINE (Pregnancy & 60+) (1 - 1-dose 60+ series) Never done    LIPID  07/13/2023    MICROALBUMIN  07/13/2023    INFLUENZA VACCINE (1) 09/01/2023    COVID-19 Vaccine (2 - 2023-24 season) 09/01/2023    DTAP/TDAP/TD IMMUNIZATION (2 - Td or Tdap) 09/20/2023    MEDICARE ANNUAL WELLNESS VISIT  11/15/2023    ANNUAL REVIEW OF HM ORDERS  11/15/2023    BMP  01/21/2024    HEMOGLOBIN  01/21/2024    FALL RISK ASSESSMENT  01/11/2025    Pneumococcal Vaccine: 65+ Years (3 of 3 - PPSV23 or PCV20) 03/22/2026    COLORECTAL CANCER SCREENING  10/06/2028    ADVANCE CARE PLANNING  01/11/2029    HEPATITIS C SCREENING  Completed    PHQ-2 (once per calendar  "year)  Completed    URINALYSIS  Completed    ZOSTER IMMUNIZATION  Completed    AORTIC ANEURYSM SCREENING (SYSTEM ASSIGNED)  Completed    IPV IMMUNIZATION  Aged Out    HPV IMMUNIZATION  Aged Out    MENINGITIS IMMUNIZATION  Aged Out    RSV MONOCLONAL ANTIBODY  Aged Out     Review of Systems   Constitutional:  Negative for chills and fever.   HENT:  Positive for congestion. Negative for ear pain, hearing loss and sore throat.    Eyes:  Negative for pain and visual disturbance.   Respiratory:  Positive for cough. Negative for shortness of breath.    Cardiovascular:  Positive for peripheral edema. Negative for chest pain and palpitations.   Gastrointestinal:  Negative for abdominal pain, constipation, diarrhea, heartburn and hematochezia.   Genitourinary:  Positive for impotence. Negative for dysuria, frequency, genital sores, hematuria and urgency.   Musculoskeletal:  Negative for arthralgias, joint swelling and myalgias.   Skin:  Negative for rash.   Neurological:  Negative for dizziness, weakness, headaches and paresthesias.   Psychiatric/Behavioral:  Negative for mood changes. The patient is not nervous/anxious.      OBJECTIVE:   /84   Pulse 92   Temp 98.1  F (36.7  C) (Temporal)   Wt 101.9 kg (224 lb 9.6 oz)   SpO2 98%   BMI 33.17 kg/m   Estimated body mass index is 33.17 kg/m  as calculated from the following:    Height as of 2/28/23: 1.753 m (5' 9\").    Weight as of this encounter: 101.9 kg (224 lb 9.6 oz).    Physical Exam  GENERAL: alert and in no distress.  EYES: conjunctivae/corneas clear. EOMs grossly intact  HENT: Facies symmetric.  RESP: CTAB, no w/r/r.  CV: RRR, no m/r/g.  GI: NT, ND, without rebound tenderness or guarding  MSK: Moves all four extremities freely.  SKIN: No significant ulcers, lesions, or rashes on the visualized portions of the skin  NEURO: CN II-XII grossly intact.    Diagnostic Test Results: Labs reviewed in Epic    ASSESSMENT / PLAN:   Medicare annual wellness visit, " "subsequent  Reviewed PMH. Discussed healthcare maintenance issues, including cancer screenings, relevant immunizations (he declined COVID and flu shots, encouraged patient obtain Tdap and RSV vaccine through a pharmacy), and cardiac risk factor screenings such as for cholesterol, HTN, and DM.  - PRIMARY CARE FOLLOW-UP SCHEDULING; Future    Essential hypertension  BP at goal. Refilled chronic medication at current dose.  - BASIC METABOLIC PANEL; Future  - amLODIPine (NORVASC) 5 MG tablet; Take 1 tablet (5 mg) by mouth daily    Hyperbilirubinemia  UpToDate patient education handout regarding Gilbert's syndrome printed off and given to patient. He also has fatty liver disease. Will screen HFP.  - Hepatic function panel; Future    History of CVA (cerebrovascular accident)  He reports a neurologist told him to increase his aspirin from 81mg daily to 325mg daily. I unfortunately could not find any outpatient neurology notes. Will defer that to neurology. Refilled statin.  - Lipid panel reflex to direct LDL Non-fasting; Future  - atorvastatin (LIPITOR) 40 MG tablet; Take 1 tablet (40 mg) by mouth daily    Screening for prostate cancer  PSA WNL in past.  - Prostate Specific Antigen Screen; Future    Stage 3a chronic kidney disease (H)  Known issue that I take into account for their medical decisions, no current exacerbations or new concerns. Monitoring labs today.  - Albumin Random Urine Quantitative with Creat Ratio; Future  - Hemoglobin; Future    COUNSELING:  Reviewed preventive health counseling, as reflected in patient instructions    BMI:   Estimated body mass index is 33.17 kg/m  as calculated from the following:    Height as of 2/28/23: 1.753 m (5' 9\").    Weight as of this encounter: 101.9 kg (224 lb 9.6 oz).     He reports that he has never smoked. He has never used smokeless tobacco.    Appropriate preventive services were discussed with this patient, including applicable screening as appropriate for fall " prevention, nutrition, physical activity, Tobacco-use cessation, weight loss and cognition.  Checklist reviewing preventive services available has been given to the patient.    Reviewed patients plan of care and provided an AVS. The Intermediate Care Plan ( asthma action plan, low back pain action plan, and migraine action plan) for Billie meets the Care Plan requirement. This Care Plan has been established and reviewed with the Patient.    Ivan Olivera MD  [facility]    Identified Health Risks:  I have reviewed Opioid Use Disorder and Substance Use Disorder risk factors and made any needed referrals.

## 2024-01-12 ENCOUNTER — PATIENT OUTREACH (OUTPATIENT)
Dept: GASTROENTEROLOGY | Facility: CLINIC | Age: 69
End: 2024-01-12
Payer: COMMERCIAL

## 2024-02-06 ENCOUNTER — APPOINTMENT (OUTPATIENT)
Dept: URBAN - METROPOLITAN AREA CLINIC 256 | Age: 69
Setting detail: DERMATOLOGY
End: 2024-02-06

## 2024-02-06 VITALS — WEIGHT: 220 LBS | HEIGHT: 69 IN

## 2024-02-06 DIAGNOSIS — Z85.828 PERSONAL HISTORY OF OTHER MALIGNANT NEOPLASM OF SKIN: ICD-10-CM

## 2024-02-06 DIAGNOSIS — D18.0 HEMANGIOMA: ICD-10-CM

## 2024-02-06 DIAGNOSIS — L82.1 OTHER SEBORRHEIC KERATOSIS: ICD-10-CM

## 2024-02-06 DIAGNOSIS — Z71.89 OTHER SPECIFIED COUNSELING: ICD-10-CM

## 2024-02-06 DIAGNOSIS — L21.8 OTHER SEBORRHEIC DERMATITIS: ICD-10-CM

## 2024-02-06 DIAGNOSIS — L82.0 INFLAMED SEBORRHEIC KERATOSIS: ICD-10-CM

## 2024-02-06 DIAGNOSIS — D22 MELANOCYTIC NEVI: ICD-10-CM

## 2024-02-06 DIAGNOSIS — L57.8 OTHER SKIN CHANGES DUE TO CHRONIC EXPOSURE TO NONIONIZING RADIATION: ICD-10-CM

## 2024-02-06 DIAGNOSIS — R20.2 PARESTHESIA OF SKIN: ICD-10-CM

## 2024-02-06 PROBLEM — D22.62 MELANOCYTIC NEVI OF LEFT UPPER LIMB, INCLUDING SHOULDER: Status: ACTIVE | Noted: 2024-02-06

## 2024-02-06 PROBLEM — D18.01 HEMANGIOMA OF SKIN AND SUBCUTANEOUS TISSUE: Status: ACTIVE | Noted: 2024-02-06

## 2024-02-06 PROBLEM — D22.5 MELANOCYTIC NEVI OF TRUNK: Status: ACTIVE | Noted: 2024-02-06

## 2024-02-06 PROBLEM — D22.61 MELANOCYTIC NEVI OF RIGHT UPPER LIMB, INCLUDING SHOULDER: Status: ACTIVE | Noted: 2024-02-06

## 2024-02-06 PROBLEM — D22.39 MELANOCYTIC NEVI OF OTHER PARTS OF FACE: Status: ACTIVE | Noted: 2024-02-06

## 2024-02-06 PROCEDURE — OTHER MIPS QUALITY: OTHER

## 2024-02-06 PROCEDURE — 17110 DESTRUCT B9 LESION 1-14: CPT

## 2024-02-06 PROCEDURE — 99213 OFFICE O/P EST LOW 20 MIN: CPT | Mod: 25

## 2024-02-06 PROCEDURE — OTHER COUNSELING: OTHER

## 2024-02-06 PROCEDURE — OTHER LIQUID NITROGEN: OTHER

## 2024-02-06 PROCEDURE — OTHER ADDITIONAL NOTES: OTHER

## 2024-02-06 ASSESSMENT — LOCATION DETAILED DESCRIPTION DERM
LOCATION DETAILED: LEFT CENTRAL MALAR CHEEK
LOCATION DETAILED: RIGHT SUPERIOR FOREHEAD
LOCATION DETAILED: RIGHT SUPERIOR LATERAL UPPER BACK
LOCATION DETAILED: LEFT INFERIOR CENTRAL MALAR CHEEK
LOCATION DETAILED: LEFT VENTRAL PROXIMAL FOREARM
LOCATION DETAILED: SUPERIOR THORACIC SPINE
LOCATION DETAILED: LEFT INFERIOR LATERAL MALAR CHEEK
LOCATION DETAILED: RIGHT ANTERIOR PROXIMAL THIGH
LOCATION DETAILED: LEFT ANTECUBITAL SKIN
LOCATION DETAILED: RIGHT VENTRAL PROXIMAL FOREARM
LOCATION DETAILED: LEFT SUPERIOR LATERAL UPPER BACK
LOCATION DETAILED: LEFT SUPERIOR MEDIAL UPPER BACK

## 2024-02-06 ASSESSMENT — LOCATION SIMPLE DESCRIPTION DERM
LOCATION SIMPLE: UPPER BACK
LOCATION SIMPLE: LEFT UPPER ARM
LOCATION SIMPLE: RIGHT FOREHEAD
LOCATION SIMPLE: RIGHT UPPER BACK
LOCATION SIMPLE: RIGHT FOREARM
LOCATION SIMPLE: LEFT CHEEK
LOCATION SIMPLE: LEFT FOREARM
LOCATION SIMPLE: RIGHT THIGH
LOCATION SIMPLE: LEFT UPPER BACK

## 2024-02-06 ASSESSMENT — LOCATION ZONE DERM
LOCATION ZONE: ARM
LOCATION ZONE: LEG
LOCATION ZONE: TRUNK
LOCATION ZONE: FACE

## 2024-02-06 NOTE — PROCEDURE: ADDITIONAL NOTES
Additional Notes: -Recommend that patient see an Optometrist for further surveillance going forward.
Detail Level: Simple
Render Risk Assessment In Note?: no

## 2024-02-06 NOTE — PROCEDURE: LIQUID NITROGEN
Medical Necessity Information: It is in your best interest to select a reason for this procedure from the list below. All of these items fulfill various CMS LCD requirements except the new and changing color options.
Medical Necessity Clause: This procedure was medically necessary because the lesions that were treated were:
Add 52 Modifier (Optional): no
Detail Level: Detailed
Consent: The patient's consent was obtained including but not limited to risks of crusting, scabbing, blistering, scarring, darker or lighter pigmentary change, recurrence, incomplete removal and infection.
Show Aperture Variable?: Yes
Post-Care Instructions: I reviewed with the patient in detail post-care instructions. Patient is to wear sunprotection, and avoid picking at any of the treated lesions. Pt may apply Vaseline to crusted or scabbing areas.
Spray Paint Text: The liquid nitrogen was applied to the skin utilizing a spray paint frosting technique.

## 2024-03-04 ENCOUNTER — NURSE TRIAGE (OUTPATIENT)
Dept: FAMILY MEDICINE | Facility: CLINIC | Age: 69
End: 2024-03-04

## 2024-03-04 ENCOUNTER — OFFICE VISIT (OUTPATIENT)
Dept: FAMILY MEDICINE | Facility: CLINIC | Age: 69
End: 2024-03-04
Payer: COMMERCIAL

## 2024-03-04 VITALS
HEART RATE: 96 BPM | RESPIRATION RATE: 16 BRPM | SYSTOLIC BLOOD PRESSURE: 147 MMHG | WEIGHT: 228.7 LBS | BODY MASS INDEX: 33.87 KG/M2 | DIASTOLIC BLOOD PRESSURE: 91 MMHG | TEMPERATURE: 96.9 F | HEIGHT: 69 IN | OXYGEN SATURATION: 96 %

## 2024-03-04 DIAGNOSIS — R05.1 ACUTE COUGH: Primary | ICD-10-CM

## 2024-03-04 PROCEDURE — 99213 OFFICE O/P EST LOW 20 MIN: CPT | Performed by: NURSE PRACTITIONER

## 2024-03-04 RX ORDER — CODEINE PHOSPHATE AND GUAIFENESIN 10; 100 MG/5ML; MG/5ML
1-2 SOLUTION ORAL
Qty: 240 ML | Refills: 0 | Status: SHIPPED | OUTPATIENT
Start: 2024-03-04

## 2024-03-04 ASSESSMENT — PAIN SCALES - GENERAL: PAINLEVEL: NO PAIN (0)

## 2024-03-04 ASSESSMENT — ENCOUNTER SYMPTOMS: COUGH: 1

## 2024-03-04 NOTE — PROGRESS NOTES
"  Assessment & Plan     (R05.1) Acute cough (primary encounter diagnosis)  Comment: Cough first started in mid-January with a cold. Sxs resolved for two weeks. He developed another cold with symptoms of congestion and cough. Is tired of recurrent cough and has tried OTC medications that have not helped.   Plan:   - guaiFENesin-codeine (ROBITUSSIN AC) 100-10 MG/5ML solution  - Tea with honey  - Xlear Nasal Spray    Vandana Wise is a 68 year old, presenting for the following health issues:  Cough    History of Present Illness       Reason for visit:  Cold cough  Symptom onset:  3-4 weeks ago    He eats 2-3 servings of fruits and vegetables daily.He consumes 1 sweetened beverage(s) daily.He exercises with enough effort to increase his heart rate 30 to 60 minutes per day.  He exercises with enough effort to increase his heart rate 7 days per week.   He is taking medications regularly.     Cold started 1/11/24, sxs resolved except for cough  Cough stopped for 2 weeks, then restarted with a sore throat, congestion & fatigue  Worse in morning & evenings  Cough sometimes wakes from sleep  No SOB or CP  No fevers, chills, diarrhea   His wife also had similar sxs  Takes mucinex which barely helps    Review of Systems  Detailed as above      Objective    BP (!) 154/90 (BP Location: Left arm, Patient Position: Sitting, Cuff Size: Adult Large)   Pulse 96   Temp 96.9  F (36.1  C) (Tympanic)   Resp 16   Ht 1.753 m (5' 9\")   Wt 103.7 kg (228 lb 11.2 oz)   SpO2 96%   BMI 33.77 kg/m    Body mass index is 33.77 kg/m .  Physical Exam  Constitutional:       Appearance: Normal appearance.   HENT:      Head: Normocephalic.   Pulmonary:      Effort: Pulmonary effort is normal. No respiratory distress.      Comments: Dry harsh cough noted  Musculoskeletal:         General: Normal range of motion.   Skin:     General: Skin is warm and dry.   Neurological:      General: No focal deficit present.      Mental Status: He is alert. "   Psychiatric:         Mood and Affect: Mood normal.         Behavior: Behavior normal.        I saw this patient in collaboration with DAVID Cazares, RN, DNP Student      I was present with the MARÍA ELENA/PA student who participated in the service and in the documentation of the services provided. I have verified the history and personally performed the physical exam and medical decision making, as documented by the student and edited by me.     MARÍA ELENA Ma, CNP    DAVID Cazares, RN, NP Student       Signed Electronically by: MARÍA ELENA Snell CNP

## 2024-03-04 NOTE — TELEPHONE ENCOUNTER
Nurse Triage SBAR    Is this a 2nd Level Triage? NO    Situation: Pt has intermittent cough, nasal congestion with yellow phlegm for 3 weeks. He is requesting Rx for cough syrup with codeine.     Background: Pt has tried OTC medication mucinex and robitussin without relief    Assessment:No fever, chest pain or SOB at rests.    Protocol Recommended Disposition:   Go To Office Now    Recommendation: Schedule OV now. Appt was scheduled.         Does the patient meet one of the following criteria for ADS visit consideration? No        Reason for Disposition   Wheezing is present    Additional Information   Negative: Bluish (or gray) lips or face   Negative: SEVERE difficulty breathing (e.g., struggling for each breath, speaks in single words)   Negative: Rapid onset of cough and has hives   Negative: Coughing started suddenly after medicine, an allergic food or bee sting   Negative: Difficulty breathing after exposure to flames, smoke, or fumes   Negative: Sounds like a life-threatening emergency to the triager   Negative: Previous asthma attacks and this feels like asthma attack   Negative: Dry cough (non-productive; no sputum or minimal clear sputum) and within 14 days of COVID-19 Exposure   Negative: MODERATE difficulty breathing (e.g., speaks in phrases, SOB even at rest, pulse 100-120) and still present when not coughing   Negative: Chest pain present when not coughing   Negative: Passed out (i.e., fainted, collapsed and was not responding)   Negative: Patient sounds very sick or weak to the triager   Negative: MILD difficulty breathing (e.g., minimal/no SOB at rest, SOB with walking, pulse <100) and still present when not coughing   Negative: Coughed up > 1 tablespoon (15 ml) blood (Exception: Blood-tinged sputum.)   Negative: Fever > 103 F (39.4 C)   Negative: Fever > 101 F (38.3 C) and over 60 years of age   Negative: Fever > 100.0 F (37.8 C) and has diabetes mellitus or a weak immune system (e.g., HIV positive,  cancer chemotherapy, organ transplant, splenectomy, chronic steroids)   Negative: Fever > 100.0 F (37.8 C) and bedridden (e.g., CVA, chronic illness, recovering from surgery)   Negative: Increasing ankle swelling    Protocols used: Cough-A-OH

## 2024-03-28 ENCOUNTER — TELEPHONE (OUTPATIENT)
Dept: UROLOGY | Facility: CLINIC | Age: 69
End: 2024-03-28
Payer: COMMERCIAL

## 2024-03-28 NOTE — TELEPHONE ENCOUNTER
"No, he was worried about the \" dark semen\" I tried to reassure him. Should he do any further testing for that?  Gita Aggarwal, SIMONA   "

## 2024-03-28 NOTE — TELEPHONE ENCOUNTER
[facility] Call Center    Phone Message    May a detailed message be left on voicemail: yes     Reason for Call: Other: Patient would like for Dr. Salamanca to call him directly. Please pt back for further discussion . Thanks.     Action Taken: [facility]    Travel Screening: Not Applicable

## 2024-03-28 NOTE — TELEPHONE ENCOUNTER
"Patient called with one episode of \"dark brown color\" to his semen. He thinks maybe earlier in the week he had  a small amount of blood in his urine. No kidney stones sx's or UTI sx's. He is on a baby ASA. Please advise.  Gita Aggarwal, SIMONA   "

## 2024-04-22 ENCOUNTER — APPOINTMENT (OUTPATIENT)
Dept: URBAN - METROPOLITAN AREA CLINIC 256 | Age: 69
Setting detail: DERMATOLOGY
End: 2024-04-22

## 2024-04-22 DIAGNOSIS — L90.5 SCAR CONDITIONS AND FIBROSIS OF SKIN: ICD-10-CM

## 2024-04-22 DIAGNOSIS — D18.0 HEMANGIOMA: ICD-10-CM

## 2024-04-22 DIAGNOSIS — D22 MELANOCYTIC NEVI: ICD-10-CM

## 2024-04-22 DIAGNOSIS — L91.8 OTHER HYPERTROPHIC DISORDERS OF THE SKIN: ICD-10-CM

## 2024-04-22 DIAGNOSIS — Z71.89 OTHER SPECIFIED COUNSELING: ICD-10-CM

## 2024-04-22 DIAGNOSIS — L73.8 OTHER SPECIFIED FOLLICULAR DISORDERS: ICD-10-CM

## 2024-04-22 DIAGNOSIS — L82.0 INFLAMED SEBORRHEIC KERATOSIS: ICD-10-CM

## 2024-04-22 DIAGNOSIS — D49.2 NEOPLASM OF UNSPECIFIED BEHAVIOR OF BONE, SOFT TISSUE, AND SKIN: ICD-10-CM

## 2024-04-22 DIAGNOSIS — L82.1 OTHER SEBORRHEIC KERATOSIS: ICD-10-CM

## 2024-04-22 DIAGNOSIS — L57.0 ACTINIC KERATOSIS: ICD-10-CM

## 2024-04-22 DIAGNOSIS — L57.8 OTHER SKIN CHANGES DUE TO CHRONIC EXPOSURE TO NONIONIZING RADIATION: ICD-10-CM

## 2024-04-22 PROBLEM — D22.62 MELANOCYTIC NEVI OF LEFT UPPER LIMB, INCLUDING SHOULDER: Status: ACTIVE | Noted: 2024-04-22

## 2024-04-22 PROBLEM — D18.01 HEMANGIOMA OF SKIN AND SUBCUTANEOUS TISSUE: Status: ACTIVE | Noted: 2024-04-22

## 2024-04-22 PROBLEM — D22.5 MELANOCYTIC NEVI OF TRUNK: Status: ACTIVE | Noted: 2024-04-22

## 2024-04-22 PROBLEM — D22.39 MELANOCYTIC NEVI OF OTHER PARTS OF FACE: Status: ACTIVE | Noted: 2024-04-22

## 2024-04-22 PROBLEM — D22.61 MELANOCYTIC NEVI OF RIGHT UPPER LIMB, INCLUDING SHOULDER: Status: ACTIVE | Noted: 2024-04-22

## 2024-04-22 PROCEDURE — OTHER PATIENT SPECIFIC COUNSELING: OTHER

## 2024-04-22 PROCEDURE — 17110 DESTRUCT B9 LESION 1-14: CPT

## 2024-04-22 PROCEDURE — 17003 DESTRUCT PREMALG LES 2-14: CPT | Mod: 59

## 2024-04-22 PROCEDURE — OTHER MIPS QUALITY: OTHER

## 2024-04-22 PROCEDURE — OTHER DEFER: OTHER

## 2024-04-22 PROCEDURE — OTHER PHOTO-DOCUMENTATION: OTHER

## 2024-04-22 PROCEDURE — OTHER ADDITIONAL NOTES: OTHER

## 2024-04-22 PROCEDURE — OTHER COUNSELING: OTHER

## 2024-04-22 PROCEDURE — OTHER LIQUID NITROGEN: OTHER

## 2024-04-22 PROCEDURE — 17000 DESTRUCT PREMALG LESION: CPT | Mod: 59

## 2024-04-22 PROCEDURE — 99213 OFFICE O/P EST LOW 20 MIN: CPT | Mod: 25

## 2024-04-22 ASSESSMENT — LOCATION DETAILED DESCRIPTION DERM
LOCATION DETAILED: LEFT CENTRAL MALAR CHEEK
LOCATION DETAILED: RIGHT LATERAL FRONTAL SCALP
LOCATION DETAILED: LEFT ANTECUBITAL SKIN
LOCATION DETAILED: RIGHT CENTRAL MALAR CHEEK
LOCATION DETAILED: LEFT INFERIOR LATERAL MALAR CHEEK
LOCATION DETAILED: RIGHT VENTRAL PROXIMAL FOREARM
LOCATION DETAILED: RIGHT ANTERIOR SHOULDER
LOCATION DETAILED: LEFT SUPERIOR MEDIAL UPPER BACK
LOCATION DETAILED: SUPERIOR THORACIC SPINE
LOCATION DETAILED: RIGHT SUPERIOR FOREHEAD
LOCATION DETAILED: LEFT INFERIOR CENTRAL MALAR CHEEK
LOCATION DETAILED: LEFT VENTRAL PROXIMAL FOREARM
LOCATION DETAILED: INFERIOR MID FOREHEAD
LOCATION DETAILED: LEFT ANTERIOR SHOULDER

## 2024-04-22 ASSESSMENT — LOCATION ZONE DERM
LOCATION ZONE: TRUNK
LOCATION ZONE: ARM
LOCATION ZONE: SCALP
LOCATION ZONE: FACE

## 2024-04-22 ASSESSMENT — LOCATION SIMPLE DESCRIPTION DERM
LOCATION SIMPLE: LEFT UPPER BACK
LOCATION SIMPLE: UPPER BACK
LOCATION SIMPLE: LEFT FOREARM
LOCATION SIMPLE: RIGHT FOREHEAD
LOCATION SIMPLE: RIGHT FOREARM
LOCATION SIMPLE: RIGHT SHOULDER
LOCATION SIMPLE: INFERIOR FOREHEAD
LOCATION SIMPLE: RIGHT CHEEK
LOCATION SIMPLE: LEFT SHOULDER
LOCATION SIMPLE: RIGHT SCALP
LOCATION SIMPLE: LEFT CHEEK
LOCATION SIMPLE: LEFT UPPER ARM

## 2024-04-22 NOTE — PROCEDURE: DEFER
Reason To Defer Override: deferring to SANTIAGO Anthony, for laser treatment
Detail Level: Detailed
X Size Of Lesion In Cm (Optional): 0
Introduction Text (Please End With A Colon): The following procedure was deferred:

## 2024-04-22 NOTE — PROCEDURE: ADDITIONAL NOTES
Render Risk Assessment In Note?: no
Additional Notes: Mohs site 12/19/2023.
Detail Level: Simple
Additional Notes: Patient is seeing Ophthalmology Specialist Dr. Mars Palacio MD for evaluation.

## 2024-04-22 NOTE — HPI: SKIN LESION
What Type Of Note Output Would You Prefer (Optional)?: Standard Output
Has Your Skin Lesion Been Treated?: not been treated
Is This A New Presentation, Or A Follow-Up?: Skin Lesion
Additional History: The patient notes he has an appointment scheduled with Dr. Mars Palacio MD for further evaluation and possible treatment.

## 2024-04-22 NOTE — PROCEDURE: PATIENT SPECIFIC COUNSELING
Discussed the patient can seek filler for his indentation concern, but that it is a short-term treatment that requires repeated treatments. Advised the patient to start with laser treatment.
Detail Level: Zone

## 2024-04-22 NOTE — PROCEDURE: LIQUID NITROGEN
Detail Level: Detailed
Show Aperture Variable?: Yes
Duration Of Freeze Thaw-Cycle (Seconds): 5-10
Number Of Freeze-Thaw Cycles: 2 freeze-thaw cycles
Render Note In Bullet Format When Appropriate: No
Medical Necessity Information: It is in your best interest to select a reason for this procedure from the list below. All of these items fulfill various CMS LCD requirements except the new and changing color options.
Spray Paint Text: The liquid nitrogen was applied to the skin utilizing a spray paint frosting technique.
Consent: The patient's verbal consent was obtained including but not limited to risks of crusting, scabbing, blistering, scarring, darker or lighter pigmentary change, recurrence, incomplete removal and infection.
Medical Necessity Clause: This procedure was medically necessary because the lesions that were treated were:
Post-Care Instructions: I reviewed with the patient in detail post-care instructions. Patient is to wear sunprotection, and avoid picking at any of the treated lesions. Pt may apply Vaseline to crusted or scabbing areas.
Application Tool (Optional): Liquid Nitrogen Sprayer
Duration Of Freeze Thaw-Cycle (Seconds): 5

## 2024-05-23 ENCOUNTER — APPOINTMENT (OUTPATIENT)
Dept: URBAN - METROPOLITAN AREA CLINIC 256 | Age: 69
Setting detail: DERMATOLOGY
End: 2024-05-23

## 2024-05-23 DIAGNOSIS — Z41.9 ENCOUNTER FOR PROCEDURE FOR PURPOSES OTHER THAN REMEDYING HEALTH STATE, UNSPECIFIED: ICD-10-CM

## 2024-05-23 PROCEDURE — OTHER COSMETIC CONSULTATION: GENERAL: OTHER

## 2024-05-23 ASSESSMENT — LOCATION SIMPLE DESCRIPTION DERM: LOCATION SIMPLE: RIGHT FOREHEAD

## 2024-05-23 ASSESSMENT — LOCATION DETAILED DESCRIPTION DERM: LOCATION DETAILED: RIGHT SUPERIOR FOREHEAD

## 2024-05-23 ASSESSMENT — LOCATION ZONE DERM: LOCATION ZONE: FACE

## 2024-06-28 ENCOUNTER — APPOINTMENT (OUTPATIENT)
Dept: URBAN - METROPOLITAN AREA CLINIC 255 | Age: 69
Setting detail: DERMATOLOGY
End: 2024-06-30

## 2024-06-28 DIAGNOSIS — L90.5 SCAR CONDITIONS AND FIBROSIS OF SKIN: ICD-10-CM

## 2024-06-28 DIAGNOSIS — Z85.828 PERSONAL HISTORY OF OTHER MALIGNANT NEOPLASM OF SKIN: ICD-10-CM

## 2024-06-28 PROCEDURE — OTHER PULSED-DYE LASER: OTHER

## 2024-06-28 PROCEDURE — OTHER COUNSELING: OTHER

## 2024-06-28 PROCEDURE — OTHER MIPS QUALITY: OTHER

## 2024-06-28 PROCEDURE — 99024 POSTOP FOLLOW-UP VISIT: CPT

## 2024-06-28 ASSESSMENT — LOCATION SIMPLE DESCRIPTION DERM: LOCATION SIMPLE: RIGHT FOREHEAD

## 2024-06-28 ASSESSMENT — LOCATION DETAILED DESCRIPTION DERM: LOCATION DETAILED: RIGHT SUPERIOR FOREHEAD

## 2024-06-28 ASSESSMENT — LOCATION ZONE DERM: LOCATION ZONE: FACE

## 2024-06-28 NOTE — PROCEDURE: PULSED-DYE LASER
Delay Time (Ms): 10
Spot Size: 10 mm
Cryogen Time (Ms): 30
Consent: Written consent obtained, risks reviewed including but not limited to crusting, scabbing, blistering, scarring, darker or lighter pigmentary change, incidental hair removal, bruising, and/or incomplete removal.
Pulse Duration: 10 ms
Pulse Duration: 6 ms
Post-Procedure Care: Vaseline and ice applied. Post care reviewed with patient.
Spot Size: 7 mm
Treated Area: small area
Detail Level: Zone
Delay Time (Ms): 20
Location Override: Forehead
Pulse Count: 19
Fluence In J/Cm2 (Optional): 7.50
Post-Care Instructions: I reviewed with the patient in detail post-care instructions. Patient should stay away from the sun and wear sun protection until treated areas are fully healed.
Immediate Endpoint: erythema
Laser Type: Vbeam 595nm

## 2024-08-30 ENCOUNTER — APPOINTMENT (OUTPATIENT)
Dept: URBAN - METROPOLITAN AREA CLINIC 255 | Age: 69
Setting detail: DERMATOLOGY
End: 2024-09-03

## 2024-08-30 DIAGNOSIS — L90.5 SCAR CONDITIONS AND FIBROSIS OF SKIN: ICD-10-CM

## 2024-08-30 DIAGNOSIS — Z85.828 PERSONAL HISTORY OF OTHER MALIGNANT NEOPLASM OF SKIN: ICD-10-CM

## 2024-08-30 PROCEDURE — OTHER PULSED-DYE LASER: OTHER

## 2024-08-30 PROCEDURE — OTHER COUNSELING: OTHER

## 2024-08-30 PROCEDURE — 99024 POSTOP FOLLOW-UP VISIT: CPT

## 2024-08-30 PROCEDURE — OTHER MIPS QUALITY: OTHER

## 2024-08-30 ASSESSMENT — LOCATION ZONE DERM: LOCATION ZONE: FACE

## 2024-08-30 ASSESSMENT — LOCATION SIMPLE DESCRIPTION DERM: LOCATION SIMPLE: RIGHT FOREHEAD

## 2024-08-30 ASSESSMENT — LOCATION DETAILED DESCRIPTION DERM: LOCATION DETAILED: RIGHT SUPERIOR FOREHEAD

## 2024-08-30 NOTE — PROCEDURE: PULSED-DYE LASER
Delay Time (Ms): 10
Spot Size: 10 mm
Cryogen Time (Ms): 30
Consent: Written consent obtained, risks reviewed including but not limited to crusting, scabbing, blistering, scarring, darker or lighter pigmentary change, incidental hair removal, bruising, and/or incomplete removal.
Pulse Duration: 10 ms
Pulse Duration: 6 ms
Post-Procedure Care: Vaseline and ice applied. Post care reviewed with patient.
Spot Size: 7 mm
Treated Area: small area
Detail Level: Zone
Delay Time (Ms): 20
Location Override: Forehead
Pulse Count: 16
Fluence In J/Cm2 (Optional): 7.00
Post-Care Instructions: I reviewed with the patient in detail post-care instructions. Patient should stay away from the sun and wear sun protection until treated areas are fully healed.
Immediate Endpoint: erythema
Laser Type: Vbeam 595nm

## 2024-09-27 ENCOUNTER — APPOINTMENT (OUTPATIENT)
Dept: URBAN - METROPOLITAN AREA CLINIC 255 | Age: 69
Setting detail: DERMATOLOGY
End: 2024-09-30

## 2024-09-27 DIAGNOSIS — L90.5 SCAR CONDITIONS AND FIBROSIS OF SKIN: ICD-10-CM

## 2024-09-27 DIAGNOSIS — Z85.828 PERSONAL HISTORY OF OTHER MALIGNANT NEOPLASM OF SKIN: ICD-10-CM

## 2024-09-27 PROCEDURE — OTHER MIPS QUALITY: OTHER

## 2024-09-27 PROCEDURE — OTHER COUNSELING: OTHER

## 2024-09-27 PROCEDURE — OTHER PULSED-DYE LASER: OTHER

## 2024-09-27 ASSESSMENT — LOCATION ZONE DERM: LOCATION ZONE: FACE

## 2024-09-27 ASSESSMENT — LOCATION SIMPLE DESCRIPTION DERM: LOCATION SIMPLE: RIGHT FOREHEAD

## 2024-09-27 ASSESSMENT — LOCATION DETAILED DESCRIPTION DERM: LOCATION DETAILED: RIGHT SUPERIOR FOREHEAD

## 2024-09-27 NOTE — PROCEDURE: PULSED-DYE LASER
Delay Time (Ms): 10
Spot Size: 10 mm
Cryogen Time (Ms): 30
Consent: Written consent obtained, risks reviewed including but not limited to crusting, scabbing, blistering, scarring, darker or lighter pigmentary change, incidental hair removal, bruising, and/or incomplete removal.
Pulse Duration: 10 ms
Post-Procedure Care: Vaseline and ice applied. Post care reviewed with patient.
Spot Size: 7 mm
Treated Area: small area
Detail Level: Zone
Delay Time (Ms): 20
Location Override: Forehead
Pulse Count: 4
Fluence In J/Cm2 (Optional): 8.00
Post-Care Instructions: I reviewed with the patient in detail post-care instructions. Patient should stay away from the sun and wear sun protection until treated areas are fully healed.
Immediate Endpoint: erythema
Laser Type: Vbeam 595nm

## 2024-11-23 ENCOUNTER — HOSPITAL ENCOUNTER (EMERGENCY)
Facility: CLINIC | Age: 69
Discharge: HOME | End: 2024-11-23
Attending: FAMILY MEDICINE | Admitting: FAMILY MEDICINE
Payer: COMMERCIAL

## 2024-11-23 ENCOUNTER — HOSPITAL ENCOUNTER (EMERGENCY)
Facility: CLINIC | Age: 69
Discharge: ANOTHER HOSPITAL | End: 2024-11-23
Attending: EMERGENCY MEDICINE | Admitting: EMERGENCY MEDICINE
Payer: COMMERCIAL

## 2024-11-23 VITALS
HEART RATE: 73 BPM | RESPIRATION RATE: 18 BRPM | OXYGEN SATURATION: 97 % | TEMPERATURE: 98 F | BODY MASS INDEX: 31.01 KG/M2 | WEIGHT: 210 LBS | DIASTOLIC BLOOD PRESSURE: 93 MMHG | SYSTOLIC BLOOD PRESSURE: 142 MMHG

## 2024-11-23 VITALS
TEMPERATURE: 97.8 F | RESPIRATION RATE: 16 BRPM | HEART RATE: 81 BPM | WEIGHT: 210 LBS | SYSTOLIC BLOOD PRESSURE: 180 MMHG | DIASTOLIC BLOOD PRESSURE: 81 MMHG | HEIGHT: 69 IN | BODY MASS INDEX: 31.1 KG/M2 | OXYGEN SATURATION: 98 %

## 2024-11-23 DIAGNOSIS — H54.7 DECREASED VISION: ICD-10-CM

## 2024-11-23 DIAGNOSIS — H43.812 PVD (POSTERIOR VITREOUS DETACHMENT), LEFT: ICD-10-CM

## 2024-11-23 PROCEDURE — 76512 OPH US DX B-SCAN: CPT

## 2024-11-23 PROCEDURE — 99203 OFFICE O/P NEW LOW 30 MIN: CPT | Mod: 4UV | Performed by: STUDENT IN AN ORGANIZED HEALTH CARE EDUCATION/TRAINING PROGRAM

## 2024-11-23 PROCEDURE — 250N000009 HC RX 250: Performed by: EMERGENCY MEDICINE

## 2024-11-23 PROCEDURE — 99285 EMERGENCY DEPT VISIT HI MDM: CPT | Mod: 25

## 2024-11-23 PROCEDURE — 99283 EMERGENCY DEPT VISIT LOW MDM: CPT | Performed by: FAMILY MEDICINE

## 2024-11-23 RX ORDER — PROPARACAINE HYDROCHLORIDE 5 MG/ML
1 SOLUTION/ DROPS OPHTHALMIC ONCE
Status: COMPLETED | OUTPATIENT
Start: 2024-11-23 | End: 2024-11-23

## 2024-11-23 RX ADMIN — PROPARACAINE HYDROCHLORIDE 1 DROP: 5 SOLUTION/ DROPS OPHTHALMIC at 09:15

## 2024-11-23 RX ADMIN — FLUORESCEIN SODIUM 1 STRIP: 1 STRIP OPHTHALMIC at 09:15

## 2024-11-23 ASSESSMENT — COLUMBIA-SUICIDE SEVERITY RATING SCALE - C-SSRS
1. IN THE PAST MONTH, HAVE YOU WISHED YOU WERE DEAD OR WISHED YOU COULD GO TO SLEEP AND NOT WAKE UP?: NO
6. HAVE YOU EVER DONE ANYTHING, STARTED TO DO ANYTHING, OR PREPARED TO DO ANYTHING TO END YOUR LIFE?: NO
6. HAVE YOU EVER DONE ANYTHING, STARTED TO DO ANYTHING, OR PREPARED TO DO ANYTHING TO END YOUR LIFE?: NO
1. IN THE PAST MONTH, HAVE YOU WISHED YOU WERE DEAD OR WISHED YOU COULD GO TO SLEEP AND NOT WAKE UP?: NO
2. HAVE YOU ACTUALLY HAD ANY THOUGHTS OF KILLING YOURSELF IN THE PAST MONTH?: NO
2. HAVE YOU ACTUALLY HAD ANY THOUGHTS OF KILLING YOURSELF IN THE PAST MONTH?: NO

## 2024-11-23 ASSESSMENT — ACTIVITIES OF DAILY LIVING (ADL)
ADLS_ACUITY_SCORE: 0

## 2024-11-23 ASSESSMENT — VISUAL ACUITY
OS: 20/50
OD: 20/30

## 2024-11-23 NOTE — ED PROVIDER NOTES
ED Provider Note  [facility]      History     Chief Complaint   Patient presents with    Eye Problem     HPI  Billie Aden is a 69 year old male with a history of basal cell carcinoma of left lower eyelid (2017), CVA (2021), stage III CKD, hypertension, and hyperlipidemia who presents to the emergency department from [facility] ED with a visual disturbance.  Patient has been experiencing flashes of light to the left eye since yesterday.  Ultrasound at [facility] ED showed retinal detachment of left eye.  Patient was recommended to present to [facility] ED to recieve an ophthalmology evaluation.    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 Right ureteral stent placement;  Surgeon: Parvez Salamanca MD;  Location:  OR    REPAIR MOHS Left 01/25/2018    Procedure: REPAIR MOHS;  Left Lower Eyelid Mohs Reconstruction;  Surgeon: Claudia Melvin MD;  Location:  OR     amLODIPine (NORVASC) 5 MG tablet  aspirin (ASA) 325 MG EC tablet  atorvastatin (LIPITOR) 40 MG  tablet  guaiFENesin-codeine (ROBITUSSIN AC) 100-10 MG/5ML solution      Allergies   Allergen Reactions    Dilaudid [Hydromorphone]      Agitation, did not help with pain    Morphine Anxiety     Family History  Family History   Problem Relation Age of Onset    Cancer - colorectal Mother 79    Cardiovascular Father 70        Had a carotid endarterectomy at age 70 years.  Tobacco user.    Heart Surgery Brother 66        Alive. Had CABG at age 66 years. Smoker    Diabetes Type 2  Brother     Melanoma Brother      Social History   Social History     Tobacco Use    Smoking status: Never    Smokeless tobacco: Never   Vaping Use    Vaping status: Never Used   Substance Use Topics    Alcohol use: Yes     Comment: occasionally    Drug use: No      A medically appropriate review of systems was performed with pertinent positives and negatives noted in the HPI, and all other systems negative.    Physical Exam   BP: 134/89  Pulse: 73  Temp: 98  F (36.7  C)  Resp: 18  Weight: 95.3 kg (210 lb)  SpO2: 97 %  Physical Exam  Constitutional:       General: He is not in acute distress.     Appearance: Normal appearance. He is not toxic-appearing.   HENT:      Head: Atraumatic.   Eyes:      General: No scleral icterus.     Conjunctiva/sclera: Conjunctivae normal.      Comments: Eye examination per ophthalmology please see their documentation   Cardiovascular:      Rate and Rhythm: Normal rate.      Heart sounds: Normal heart sounds.   Pulmonary:      Effort: Pulmonary effort is normal. No respiratory distress.      Breath sounds: Normal breath sounds.   Abdominal:      Palpations: Abdomen is soft.      Tenderness: There is no abdominal tenderness.   Musculoskeletal:         General: No deformity.      Cervical back: Neck supple.   Skin:     General: Skin is warm.   Neurological:      General: No focal deficit present.      Mental Status: He is alert and oriented to person, place, and time.      Sensory: No sensory deficit.      Motor: No  weakness.      Coordination: Coordination normal.           ED Course, Procedures, & Data      Procedures         Results for orders placed or performed during the hospital encounter of 11/23/24   POC US SOFT TISSUE     Status: None    Impression    [facility] Procedure Note     Limited Bedside ED Ultrasound for Ocular complaints:    PROCEDURE: PERFORMED BY: Dr. Ashley Cooley MD  INDICATIONS/SYMPTOM: floaters  PROBE: High frequency linear probe  FINDINGS:  Left eye:      Lens location: Normal   Retinal detachment: Present   Posterior chamber hemorrhage: Petrous and hyper coag material seen in the posterior chamber, adjacent to the retina.   Intraocular foreign body: Absent    INTERPRETATION: Normal US of the Right Eye.  The optic nerve sheath diameter indicated no increased ICP.  The lens location and retina appeared normal.  No posterior chamber hemorrhage was visualized.  No foreign bodies seen.  Abnormal US of the Left eye with flap concerning for retinal detachment.  IMAGE DOCUMENTATION: Images were archived to PACs system.         Medications - No data to display  Labs Ordered and Resulted from Time of ED Arrival to Time of ED Departure - No data to display  No orders to display          Critical care was not performed.     Medical Decision Making  The patient's presentation was of moderate complexity (an acute illness with systemic symptoms).    The patient's evaluation involved:  discussion of management or test interpretation with another health professional (ophthalmology was consulted and saw and evaluated the patient please see their documentation.)    The patient's management necessitated moderate risk (needing close follow-up with ophthalmology).    Assessment & Plan    Patient with posterior vitreous detachment at this time seen by ophthalmology will be following up with primary MD as well as ophthalmology as discussed    I have reviewed the nursing notes. I have reviewed the findings,  diagnosis, plan and need for follow up with the patient.        Final diagnoses:   PVD (posterior vitreous detachment), left       Harish Avina MD  [facility] EMERGENCY DEPARTMENT  11/23/2024     Harish Avina MD  11/24/24 2013

## 2024-11-23 NOTE — DISCHARGE INSTRUCTIONS
Return precautions that would warrant immediate evaluation including decrease in vision, sudden gush of new floaters, increase in flashes of light and a dark curtain/shadow over the visual field. If any of those occur please call our 24/7 nurse line or go directly to the [place] [facility].

## 2024-11-23 NOTE — DISCHARGE INSTRUCTIONS
Please proceed directly to the emergency department at the [facility] ED.  There, they have access to an ophthalmologist to do a complete evaluation.  I am concerned that your symptoms and exam are consistent with a retinal detachment, which is a diagnosis that require quires care by specialist.  This may require surgery, which is why I am recommending you go there right away without stopping and route.  If for what ever reason you cannot be seen, come back to our emergency department.

## 2024-11-23 NOTE — ED PROVIDER NOTES
"  Emergency Department Note      History of Present Illness     Chief Complaint   Eye Problem      HPI   Billie Aden is a 69 year old male with history of hypertension, CVA, and hyperlipidemia, presenting with flashes of light to the left eye. The patient reports that since yesterday he has had flashes of light in the lower left field of vision out of his left eye. He describes these flashes as multiple segments of light that do not connect, and he also has some floaters in his vision as well. He denies having this issue in his eyes in the past. The patient does endorse pressure behind his left eye but no pain or trauma to the region. He denies headache, nausea, and vomiting. He takes aspirin 325 mg but is otherwise anticoagulated.    Independent Historian   None    Review of External Notes   No recent relevant notes.    Past Medical History     Medical History and Problem List   Hyperlipidemia  CKD  Nephrolithiasis  Coronary artery calcification  Fatty liver disease  CVA  Colon polyp  Diverticulitis  Basal cell carcinoma  Arthritis  Abdominal hernia  Thrombophlebitis  Hypertension  Mesenteric vein thrombosis    Medications   Norvasc  Aspirin 325 mg  Lipitor    Surgical History   Colonoscopy  Cystoscopy  Cosmetic surgery  Cholecystectomy  Laser holmium lithotripsy ureter, insert stent, combined (R)  Mohs repair (L)    Physical Exam     Patient Vitals for the past 24 hrs:   BP Temp Temp src Pulse Resp SpO2 Height Weight   11/23/24 0840 (!) 180/81 97.8  F (36.6  C) Temporal 81 16 98 % 1.753 m (5' 9\") 95.3 kg (210 lb)     Physical Exam  Gen:  Pleasant, appears stated age.    Left eye:    VA: OD 20/50, OS 20/30 7 with glasses  Lashes - normal  Lids - Palpebral conjunctiva not inflamed.   Sclera -not injected  Pupils - are equal, round, and reactive.  No pain with consensual response.  Fernando pen -18 mmHg  Funduscopic exam - normal appearing optic disc, very limited view of the retina.    Musculoskeletal:     Normal " movement of all extremities without evidence for deficit.    Extremities:    No edema.  Atraumatic.      Skin:   Warm and dry.  No rash.    Neurologic:    Non-focal exam without asymmetric weakness or numbness.    Fluent speech.    Psychiatric:     Normal affect with appropriate interaction with examiner.      Diagnostics     Lab Results   Labs Ordered and Resulted from Time of ED Arrival to Time of ED Departure - No data to display    Imaging   POC US SOFT TISSUE   Final Result   [facility] Procedure Note       Limited Bedside ED Ultrasound for Ocular complaints:      PROCEDURE: PERFORMED BY: Dr. Ashley Cooley MD   INDICATIONS/SYMPTOM: floaters   PROBE: High frequency linear probe   FINDINGS:   Left eye:       Lens location: Normal    Retinal detachment: Present    Posterior chamber hemorrhage: Petrous and hyper coag material seen in the posterior chamber, adjacent to the retina.    Intraocular foreign body: Absent      INTERPRETATION: Normal US of the Right Eye.  The optic nerve sheath diameter indicated no increased ICP.  The lens location and retina appeared normal.  No posterior chamber hemorrhage was visualized.  No foreign bodies seen.   Abnormal US of the Left eye with flap concerning for retinal detachment.   IMAGE DOCUMENTATION: Images were archived to PACs system.                Independent Interpretation   None    ED Course      Medications Administered   Medications   proparacaine (ALCAINE) 0.5 % ophthalmic solution 1 drop (has no administration in time range)   fluorescein (FUL-АЛЕКСАНДР) ophthalmic strip 1 strip (has no administration in time range)       Discussion of Management   0942 I spoke with Dr. Avina, [facility], who accepts the patient for transfer.    ED Course   ED Course as of 11/23/24 1008   Sat Nov 23, 2024   0942 I obtained history and examined the patient as noted above   0942 I spoke to Dr. Avina with [facility], [facility], who accepts the patient for  transfer.       Additional Documentation  None    Medical Decision Making / Diagnosis     CMS Diagnoses: None    MIPS       None    MDM   Billie Aden is a 69 year old male with a history of hypertension on a full dose aspirin daily, presenting today with about 15 hours of decreased vision in the left eye, sensation of floaters on exam, the patient is hypertensive.  No findings to demonstrate glaucoma.  Bedside ultrasound is concerning for retinal detachment, versus posterior vitreous detachment.  Fundoscopy is very limited.  Patient was transferred via private vehicle to [facility], where he can have ophthalmology evaluation.  Dr. Jefferson at [facility] is excepting.  Patient's wife is driving, and they feel comfortable driving, I instructed them to proceed directly to the [facility].  Directions were provided.  Return to the ED if for what ever reason they are unable to be seen at [facility].    Disposition   The patient was transferred to [facility] ED via private vehicle. Dr. Avina accepted the patient for transfer.     Diagnosis     ICD-10-CM    1. Decreased vision  H54.7     concern for retinal detachment                 Scribe Disclosure:  I, Varun Starks, am serving as a scribe at 9:21 AM on 11/23/2024 to document services personally performed by Ashley Cooley MD based on my observations and the provider's statements to me.        Ashley Cooley MD  11/23/24 7559

## 2024-11-23 NOTE — ED NOTES
Bed: ED20  Expected date: 11/23/24  Expected time:   Means of arrival:   Comments:  [place]- L detached retina

## 2024-11-23 NOTE — ED TRIAGE NOTES
Pt sts since yesterday he has had flashes of light to lateral left eye     Triage Assessment (Adult)       Row Name 11/23/24 0843          Triage Assessment    Airway WDL WDL        Respiratory WDL    Respiratory WDL WDL        Skin Circulation/Temperature WDL    Skin Circulation/Temperature WDL WDL        Cardiac WDL    Cardiac WDL WDL        Peripheral/Neurovascular WDL    Peripheral Neurovascular WDL WDL        Cognitive/Neuro/Behavioral WDL    Cognitive/Neuro/Behavioral WDL WDL

## 2024-11-23 NOTE — CONSULTS
OPHTHALMOLOGY CONSULT NOTE      Patient: Billie Aden  Consulted by: [facility]  Reason for Consult: Concern for RD  Date of initial evaluation: 11/23/24    ASSESSMENT/PLAN:     Billie Aden is a 69 year old male who presents with     # Acute posterior vitreous detachment, left eye   The cause of the patient's flashes and floater of the left eye is most likely a symptomatic posterior vitreous detachment. Reassuringly the patient's VA is 20/25 +2 in the affected eye, they had full visual fields by confrontational testing, negative murray's sign, no vitreous hemorrhage, and no retinal holes, tears, or breaks were appreciated on scleral depressed examination. Ultrasound did not show vitreous hemorrhage or a retinal break. No preceding trauma. A Vieira ring was visualized.      RECOMMENDATIONS:  - Follow up in 4 weeks for repeat dilated exam  - Message sent to clinic staff to assist with scheduling  - Discussed return precautions that would warrant immediate evaluation including decrease in vision, new floaters, flashes of light, dark curtain/shadow over the visual field. Also discussed that the same process is likely to happen to the currently unaffected eye.     It is our pleasure to participate in this patient's care and treatment. Please contact us with any further questions or concerns.    Patient discussed with retina fellow Keegan Mendenhall MD.    Clemente Irene MD  Resident Physician, PGY-2  Department of Ophthalmology      HISTORY OF PRESENTING ILLNESS:     Billie Aden is a 69 year old male with PMH of BCC of left lower lid s/p Mohs surgery and reconstruction, thrombophlebitis, HTN, heart disease, CVA (2021), stage III CKD and pelvic fracture who presents as a transfer from [facility] for evaluation of acute left eye flashes and floaters.    Patient states that last night he began to acutely noticed flashes and a new floater in the inferotemporal aspect of his left eye. The flashes have continued  through this morning and occur with eye movements. They are not suppressible. He describes the floater as small and mainly circular with a tail. There is one floater - no gush of many floaters. He can see through the floater. He denies a curtain over his visual field.     He denies a trauma history. He wears glasses but is not sure if he a myope/high myope. He has never had eye surgery. A vision change like this has never happened before.     On arrival to [facility] an ultrasound was performed with concern for RD vs. PVD. BP was 180/81 on arrival to [facility]. It is 134/89 in [facility] ED.    Review of systems were otherwise negative except for that which has been stated above.      OCULAR/MEDICAL/SURGICAL HISTORIES:     Past Ocular History:  Last eye exam: Patient states he has not seen an eye doctor in a very long time  Prior eye surgery/laser: None  Contact lens wear: None  Glasses: Yes  Eyedrops: None    Family History:  No retinal detachments    Social History:  Retired, does a lot of DIY projects around the house     Past Medical History:   Diagnosis Date    Arthritis     BCC (basal cell carcinoma of skin) 11/2017    left lower lid    Cerebrovascular accident (CVA), unspecified mechanism (H) 08/06/2021    Cholelithiasis 10/08/2013    Heart disease     Hernia, abdominal     History of thrombophlebitis     Hypertension 08/2018    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 Right ureteral stent placement;  Surgeon: Parvez Salamanca MD;  Location: [facility] OR    REPAIR MOHS Left 01/25/2018    Procedure: REPAIR MOHS;  Left Lower Eyelid Mohs Reconstruction;  Surgeon: Claudia Melvin MD;  Location:  OR       EXAMINATION:     Base Eye Exam       Visual Acuity (Snellen - Linear)         Right Left    Near cc 20/25 20/25 +2              Tonometry (Tonopen, 11:39 AM)         Right Left    Pressure 16 16              Pupils         Pupils Shape React APD    Right PERRL Round Brisk None    Left PERRL Round Brisk None              Visual Fields         Left Right     Full Full              Extraocular Movement         Right Left     Full Full              Neuro/Psych       Oriented x3: Yes              Dilation       Both eyes: 1.0% Mydriacyl, 2.5% Sergio Synephrine @ 11:39 AM                  Slit Lamp and Fundus Exam       External Exam         Right Left    External Normal Normal              Slit Lamp Exam         Right Left    Lids/Lashes Normal Normal    Conjunctiva/Sclera White and quiet White and quiet    Cornea Clear Clear    Anterior Chamber Deep and quiet Deep and quiet    Iris Round and reactive -> pharmacologically dilated Round and reactive -> pharmacologically dilated    Lens 1+ NS 1+ NS    Anterior Vitreous Normal, neg chip sign Vieira ring, neg chip sign              Fundus Exam         Right Left    Disc Pink, sharp, flat Pink, sharp, flat    C/D Ratio 0.25 0.25    Macula Normal, sharp FLR, no lesions Normal, sharp FLR, no lesions    Vessels Normal, no sheathing Normal, no sheathing    Periphery Normal, no lesions, breaks or tears on 360 scleral depressed exam Normal, no lesions, breaks or tears on 360 scleral depressed exam                    Labs/Studies/Imaging Performed:  Ultrasound with PVD, no vitreous hemorrhage or retinal detachment

## 2024-11-25 ENCOUNTER — TELEPHONE (OUTPATIENT)
Dept: OPHTHALMOLOGY | Facility: CLINIC | Age: 69
End: 2024-11-25
Payer: COMMERCIAL

## 2024-11-25 NOTE — TELEPHONE ENCOUNTER
his patient was seen in [facility] and [facility] acute PVD. Can we set up an optom follow up for dilated exam in 4 weeks     -    Note to  to assist in 4 week follow up in Optometry--Dr. Flowers, Dr. Oviedo, Dr. French    New adult eye  4 week follow up PVD    Brenden Walton RN 7:16 AM 11/25/24

## 2024-12-12 ENCOUNTER — PATIENT OUTREACH (OUTPATIENT)
Dept: CARE COORDINATION | Facility: CLINIC | Age: 69
End: 2024-12-12
Payer: COMMERCIAL

## 2024-12-23 ENCOUNTER — OFFICE VISIT (OUTPATIENT)
Dept: OPHTHALMOLOGY | Facility: CLINIC | Age: 69
End: 2024-12-23
Attending: OPTOMETRIST
Payer: COMMERCIAL

## 2024-12-23 DIAGNOSIS — H25.13 AGE-RELATED NUCLEAR CATARACT OF BOTH EYES: Primary | ICD-10-CM

## 2024-12-23 DIAGNOSIS — H43.812 POSTERIOR VITREOUS DETACHMENT OF LEFT EYE: ICD-10-CM

## 2024-12-23 PROCEDURE — G0463 HOSPITAL OUTPT CLINIC VISIT: HCPCS | Performed by: OPTOMETRIST

## 2024-12-23 ASSESSMENT — CONF VISUAL FIELD
METHOD: COUNTING FINGERS
OD_NORMAL: 1
OD_SUPERIOR_NASAL_RESTRICTION: 0
OS_NORMAL: 1
OS_SUPERIOR_TEMPORAL_RESTRICTION: 0
OS_INFERIOR_NASAL_RESTRICTION: 0
OS_INFERIOR_TEMPORAL_RESTRICTION: 0
OD_INFERIOR_TEMPORAL_RESTRICTION: 0
OS_SUPERIOR_NASAL_RESTRICTION: 0
OD_SUPERIOR_TEMPORAL_RESTRICTION: 0
OD_INFERIOR_NASAL_RESTRICTION: 0

## 2024-12-23 ASSESSMENT — SLIT LAMP EXAM - LIDS
COMMENTS: NORMAL
COMMENTS: NORMAL

## 2024-12-23 ASSESSMENT — VISUAL ACUITY
OS_CC: 20/25
METHOD: SNELLEN - LINEAR
OD_CC+: -2
OD_CC: 20/20
CORRECTION_TYPE: GLASSES

## 2024-12-23 ASSESSMENT — REFRACTION_WEARINGRX
OD_CYLINDER: +1.50
OD_ADD: +2.25
OS_ADD: +2.25
SPECS_TYPE: PAL
OS_AXIS: 076
OD_SPHERE: -4.50
OS_SPHERE: -5.00
OD_AXIS: 084
OS_CYLINDER: +2.50

## 2024-12-23 ASSESSMENT — EXTERNAL EXAM - LEFT EYE: OS_EXAM: NORMAL

## 2024-12-23 ASSESSMENT — TONOMETRY
OD_IOP_MMHG: 12
IOP_METHOD: TONOPEN
OS_IOP_MMHG: 13

## 2024-12-23 ASSESSMENT — EXTERNAL EXAM - RIGHT EYE: OD_EXAM: NORMAL

## 2024-12-23 ASSESSMENT — CUP TO DISC RATIO
OS_RATIO: 0.25
OD_RATIO: 0.25

## 2024-12-23 NOTE — PATIENT INSTRUCTIONS
Still some flashing   No loss of vision  No new floaters    Discussed vitreoul floaters and PVD   Educated patient on signs and symptoms of retinal detachment including increase in flashes, floaters, or a change in vision. If symptoms present, return to clinic immediately.   Explained cataracts and glare

## 2024-12-23 NOTE — NURSING NOTE
Chief Complaints and History of Present Illnesses   Patient presents with    New Patient     New patient with floaters and flashes in the Left eye for one month     Chief Complaint(s) and History of Present Illness(es)       New Patient              Laterality: left eye    Associated symptoms: flashes and floaters    Treatments tried: no treatments    Pain scale: 0/10    Comments: New patient with floaters and flashes in the Left eye for one month              Comments    New patient for PVD check in the Left eye.  Kesha Nice, COA, COA 8:07 AM 12/23/2024

## 2024-12-23 NOTE — PROGRESS NOTES
Assessment & Plan       Billie Aden is a 69 year old male with the following diagnoses:   1. Age-related nuclear cataract of both eyes - Both Eyes    2. Posterior vitreous detachment of left eye - Left Eye        Still some flashing   No loss of vision  No new floaters    Discussed vitreoul floaters and PVD   Educated patient on signs and symptoms of retinal detachment including increase in flashes, floaters, or a change in vision. If symptoms present, return to clinic immediately.   Explained cataracts and glare     Patient disposition:   No follow-ups on file.          Complete documentation of historical and exam elements from today's encounter can be found in the full encounter summary report (not reduplicated in this progress note). I personally obtained the chief complaint(s) and history of present illness.  I confirmed and edited as necessary the review of systems, past medical/surgical history, family history, social history, and examination findings as documented by others; and I examined the patient myself. I personally reviewed the relevant tests, images, and reports as documented above. I formulated and edited as necessary the assessment and plan and discussed the findings and management plan with the patient and family.  Dr. Shukri Flowers

## 2024-12-26 ENCOUNTER — PATIENT OUTREACH (OUTPATIENT)
Dept: CARE COORDINATION | Facility: CLINIC | Age: 69
End: 2024-12-26
Payer: COMMERCIAL

## 2025-02-22 ENCOUNTER — HEALTH MAINTENANCE LETTER (OUTPATIENT)
Age: 70
End: 2025-02-22

## 2025-03-02 DIAGNOSIS — I10 ESSENTIAL HYPERTENSION: ICD-10-CM

## 2025-03-04 RX ORDER — AMLODIPINE BESYLATE 5 MG/1
5 TABLET ORAL DAILY
Qty: 90 TABLET | Refills: 4 | Status: SHIPPED | OUTPATIENT
Start: 2025-03-04

## 2025-03-15 DIAGNOSIS — Z86.73 HISTORY OF CVA (CEREBROVASCULAR ACCIDENT): ICD-10-CM

## 2025-03-18 RX ORDER — ATORVASTATIN CALCIUM 40 MG/1
40 TABLET, FILM COATED ORAL DAILY
Qty: 90 TABLET | Refills: 4 | Status: SHIPPED | OUTPATIENT
Start: 2025-03-18

## 2025-06-12 ENCOUNTER — APPOINTMENT (OUTPATIENT)
Dept: URBAN - METROPOLITAN AREA CLINIC 256 | Age: 70
Setting detail: DERMATOLOGY
End: 2025-06-12

## 2025-06-12 VITALS — WEIGHT: 210 LBS | HEIGHT: 69 IN

## 2025-06-12 DIAGNOSIS — L57.0 ACTINIC KERATOSIS: ICD-10-CM

## 2025-06-12 DIAGNOSIS — Z85.828 PERSONAL HISTORY OF OTHER MALIGNANT NEOPLASM OF SKIN: ICD-10-CM

## 2025-06-12 PROCEDURE — OTHER MIPS QUALITY: OTHER

## 2025-06-12 PROCEDURE — OTHER COUNSELING: OTHER

## 2025-06-12 PROCEDURE — OTHER LIQUID NITROGEN: OTHER

## 2025-06-12 PROCEDURE — 17000 DESTRUCT PREMALG LESION: CPT

## 2025-06-12 PROCEDURE — 99212 OFFICE O/P EST SF 10 MIN: CPT | Mod: 25

## 2025-06-12 PROCEDURE — 17003 DESTRUCT PREMALG LES 2-14: CPT

## 2025-06-12 ASSESSMENT — LOCATION ZONE DERM
LOCATION ZONE: FACE
LOCATION ZONE: SCALP

## 2025-06-12 ASSESSMENT — LOCATION SIMPLE DESCRIPTION DERM
LOCATION SIMPLE: SCALP
LOCATION SIMPLE: LEFT FOREHEAD
LOCATION SIMPLE: RIGHT FOREHEAD
LOCATION SIMPLE: RIGHT SCALP

## 2025-06-12 ASSESSMENT — LOCATION DETAILED DESCRIPTION DERM
LOCATION DETAILED: RIGHT CENTRAL FRONTAL SCALP
LOCATION DETAILED: RIGHT SUPERIOR PARIETAL SCALP
LOCATION DETAILED: RIGHT SUPERIOR FRONTAL SCALP
LOCATION DETAILED: RIGHT SUPERIOR FOREHEAD
LOCATION DETAILED: LEFT FOREHEAD
LOCATION DETAILED: RIGHT SUPERIOR MEDIAL FOREHEAD

## 2025-07-10 ENCOUNTER — PATIENT OUTREACH (OUTPATIENT)
Dept: CARE COORDINATION | Facility: CLINIC | Age: 70
End: 2025-07-10
Payer: COMMERCIAL

## (undated) DEVICE — SOL WATER IRRIG 3000ML BAG 2B7117

## (undated) DEVICE — NDL 30GA 0.5" 305106

## (undated) DEVICE — EYE PREP BETADINE 5% SOLUTION 30ML 0065-0411-30

## (undated) DEVICE — SOL WATER IRRIG 1000ML BOTTLE 2F7114

## (undated) DEVICE — BASKET RETRIEVAL 1.9FRX120CM ESCAPE NTNL 4 WIRE 390-201

## (undated) DEVICE — GUIDEWIRE URO STR STIFF .035"X150CM NITINOL 150NSS35

## (undated) DEVICE — BAG DRAIN URO FOR SIEMENS 8MM ADAPTER NS CC164NS-A

## (undated) DEVICE — PAD CHUX UNDERPAD 23X24" 7136

## (undated) DEVICE — GLOVE PROTEXIS MICRO 7.5  2D73PM75

## (undated) DEVICE — SYR 03ML LL W/O NDL 309657

## (undated) DEVICE — ESU EYE HIGH TEMP 65410-183

## (undated) DEVICE — SHEATH URETERAL ACCESS NAVIGATOR HD 13/15FRX46CM M0062502290

## (undated) DEVICE — BLADE KNIFE SURG 15 371115

## (undated) DEVICE — CATH URETERAL FLEX TIP TIGERTAIL 06FRX70CM 139006

## (undated) DEVICE — PACK CYSTOSCOPY SBA15CYFSI

## (undated) DEVICE — PEN MARKING SKIN VISIMARK 1424SR

## (undated) DEVICE — LASER FIBER HOLMIUM MOSES 360 D/F/L AC-10030110

## (undated) DEVICE — PACK MINOR EYE CUSTOM ASC

## (undated) DEVICE — LINEN TOWEL PACK X5 5464

## (undated) DEVICE — SOL WATER IRRIG 500ML BOTTLE 2F7113

## (undated) RX ORDER — LIDOCAINE HYDROCHLORIDE 20 MG/ML
INJECTION, SOLUTION EPIDURAL; INFILTRATION; INTRACAUDAL; PERINEURAL
Status: DISPENSED
Start: 2018-01-25

## (undated) RX ORDER — LIDOCAINE HYDROCHLORIDE 10 MG/ML
INJECTION, SOLUTION EPIDURAL; INFILTRATION; INTRACAUDAL; PERINEURAL
Status: DISPENSED
Start: 2023-02-21

## (undated) RX ORDER — PROPOFOL 10 MG/ML
INJECTION, EMULSION INTRAVENOUS
Status: DISPENSED
Start: 2018-01-25

## (undated) RX ORDER — FENTANYL CITRATE 50 UG/ML
INJECTION, SOLUTION INTRAMUSCULAR; INTRAVENOUS
Status: DISPENSED
Start: 2023-02-21

## (undated) RX ORDER — DEXAMETHASONE SODIUM PHOSPHATE 4 MG/ML
INJECTION, SOLUTION INTRA-ARTICULAR; INTRALESIONAL; INTRAMUSCULAR; INTRAVENOUS; SOFT TISSUE
Status: DISPENSED
Start: 2023-02-21

## (undated) RX ORDER — PROPOFOL 10 MG/ML
INJECTION, EMULSION INTRAVENOUS
Status: DISPENSED
Start: 2023-02-21

## (undated) RX ORDER — ONDANSETRON 2 MG/ML
INJECTION INTRAMUSCULAR; INTRAVENOUS
Status: DISPENSED
Start: 2023-02-21

## (undated) RX ORDER — ACETAMINOPHEN 325 MG/1
TABLET ORAL
Status: DISPENSED
Start: 2018-01-25

## (undated) RX ORDER — GABAPENTIN 100 MG/1
CAPSULE ORAL
Status: DISPENSED
Start: 2018-01-25

## (undated) RX ORDER — CEFAZOLIN SODIUM 1 G/3ML
INJECTION, POWDER, FOR SOLUTION INTRAMUSCULAR; INTRAVENOUS
Status: DISPENSED
Start: 2023-02-21

## (undated) RX ORDER — ERYTHROMYCIN 5 MG/G
OINTMENT OPHTHALMIC
Status: DISPENSED
Start: 2018-01-25

## (undated) RX ORDER — TETRACAINE HYDROCHLORIDE 5 MG/ML
SOLUTION OPHTHALMIC
Status: DISPENSED
Start: 2018-01-25